# Patient Record
Sex: MALE | Race: BLACK OR AFRICAN AMERICAN | NOT HISPANIC OR LATINO | Employment: OTHER | URBAN - METROPOLITAN AREA
[De-identification: names, ages, dates, MRNs, and addresses within clinical notes are randomized per-mention and may not be internally consistent; named-entity substitution may affect disease eponyms.]

---

## 2022-02-14 ENCOUNTER — HOSPITAL ENCOUNTER (INPATIENT)
Facility: HOSPITAL | Age: 72
LOS: 7 days | Discharge: HOME HEALTH | DRG: 617 | End: 2022-02-21
Attending: EMERGENCY MEDICINE | Admitting: FAMILY MEDICINE
Payer: MEDICARE

## 2022-02-14 DIAGNOSIS — E11.621 DIABETIC ULCER OF TOE OF LEFT FOOT ASSOCIATED WITH TYPE 2 DIABETES MELLITUS, WITH FAT LAYER EXPOSED: Primary | ICD-10-CM

## 2022-02-14 DIAGNOSIS — L97.522 DIABETIC ULCER OF TOE OF LEFT FOOT ASSOCIATED WITH TYPE 2 DIABETES MELLITUS, WITH FAT LAYER EXPOSED: Primary | ICD-10-CM

## 2022-02-14 DIAGNOSIS — M86.9 OSTEOMYELITIS OF GREAT TOE OF LEFT FOOT: ICD-10-CM

## 2022-02-14 DIAGNOSIS — M86.10 ACUTE OSTEOMYELITIS: ICD-10-CM

## 2022-02-14 DIAGNOSIS — M79.676 TOE PAIN: ICD-10-CM

## 2022-02-14 PROBLEM — D50.9 MICROCYTIC ANEMIA: Status: ACTIVE | Noted: 2022-02-14

## 2022-02-14 PROBLEM — E66.811 OBESITY (BMI 30.0-34.9): Status: ACTIVE | Noted: 2022-02-14

## 2022-02-14 PROBLEM — L97.529 DIABETIC ULCER OF LEFT FOOT: Status: ACTIVE | Noted: 2022-02-14

## 2022-02-14 PROBLEM — N17.9 AKI (ACUTE KIDNEY INJURY): Status: ACTIVE | Noted: 2022-02-14

## 2022-02-14 PROBLEM — I10 HYPERTENSION: Status: ACTIVE | Noted: 2022-02-14

## 2022-02-14 PROBLEM — E66.9 OBESITY (BMI 30.0-34.9): Status: ACTIVE | Noted: 2022-02-14

## 2022-02-14 LAB
ALBUMIN SERPL BCP-MCNC: 3.8 G/DL (ref 3.5–5.2)
ALP SERPL-CCNC: 106 U/L (ref 55–135)
ALT SERPL W/O P-5'-P-CCNC: 13 U/L (ref 10–44)
ANION GAP SERPL CALC-SCNC: 12 MMOL/L (ref 8–16)
AST SERPL-CCNC: 15 U/L (ref 10–40)
BASOPHILS # BLD AUTO: 0.05 K/UL (ref 0–0.2)
BASOPHILS NFR BLD: 0.4 % (ref 0–1.9)
BILIRUB SERPL-MCNC: 0.4 MG/DL (ref 0.1–1)
BUN SERPL-MCNC: 34 MG/DL (ref 8–23)
CALCIUM SERPL-MCNC: 9.1 MG/DL (ref 8.7–10.5)
CHLORIDE SERPL-SCNC: 92 MMOL/L (ref 95–110)
CO2 SERPL-SCNC: 25 MMOL/L (ref 23–29)
CREAT SERPL-MCNC: 1.9 MG/DL (ref 0.5–1.4)
DIFFERENTIAL METHOD: ABNORMAL
EOSINOPHIL # BLD AUTO: 0.1 K/UL (ref 0–0.5)
EOSINOPHIL NFR BLD: 0.6 % (ref 0–8)
ERYTHROCYTE [DISTWIDTH] IN BLOOD BY AUTOMATED COUNT: 15.1 % (ref 11.5–14.5)
EST. GFR  (AFRICAN AMERICAN): 40.7 ML/MIN/1.73 M^2
EST. GFR  (NON AFRICAN AMERICAN): 35.2 ML/MIN/1.73 M^2
GLUCOSE SERPL-MCNC: 355 MG/DL (ref 70–110)
HCT VFR BLD AUTO: 36.4 % (ref 40–54)
HGB BLD-MCNC: 11 G/DL (ref 14–18)
IMM GRANULOCYTES # BLD AUTO: 0.03 K/UL (ref 0–0.04)
IMM GRANULOCYTES NFR BLD AUTO: 0.3 % (ref 0–0.5)
LACTATE SERPL-SCNC: 1.7 MMOL/L (ref 0.5–1.9)
LYMPHOCYTES # BLD AUTO: 1.7 K/UL (ref 1–4.8)
LYMPHOCYTES NFR BLD: 15.1 % (ref 18–48)
MCH RBC QN AUTO: 23.9 PG (ref 27–31)
MCHC RBC AUTO-ENTMCNC: 30.2 G/DL (ref 32–36)
MCV RBC AUTO: 79 FL (ref 82–98)
MONOCYTES # BLD AUTO: 0.7 K/UL (ref 0.3–1)
MONOCYTES NFR BLD: 5.9 % (ref 4–15)
NEUTROPHILS # BLD AUTO: 9 K/UL (ref 1.8–7.7)
NEUTROPHILS NFR BLD: 77.7 % (ref 38–73)
NRBC BLD-RTO: 0 /100 WBC
PLATELET # BLD AUTO: 544 K/UL (ref 150–450)
PMV BLD AUTO: 10.9 FL (ref 9.2–12.9)
POTASSIUM SERPL-SCNC: 4.8 MMOL/L (ref 3.5–5.1)
PROT SERPL-MCNC: 9 G/DL (ref 6–8.4)
RBC # BLD AUTO: 4.6 M/UL (ref 4.6–6.2)
SARS-COV-2 RDRP RESP QL NAA+PROBE: NEGATIVE
SODIUM SERPL-SCNC: 129 MMOL/L (ref 136–145)
WBC # BLD AUTO: 11.55 K/UL (ref 3.9–12.7)

## 2022-02-14 PROCEDURE — 83605 ASSAY OF LACTIC ACID: CPT | Performed by: EMERGENCY MEDICINE

## 2022-02-14 PROCEDURE — 86140 C-REACTIVE PROTEIN: CPT | Performed by: EMERGENCY MEDICINE

## 2022-02-14 PROCEDURE — U0002 COVID-19 LAB TEST NON-CDC: HCPCS | Performed by: NURSE PRACTITIONER

## 2022-02-14 PROCEDURE — 80053 COMPREHEN METABOLIC PANEL: CPT | Performed by: EMERGENCY MEDICINE

## 2022-02-14 PROCEDURE — 87070 CULTURE OTHR SPECIMN AEROBIC: CPT | Performed by: EMERGENCY MEDICINE

## 2022-02-14 PROCEDURE — 85025 COMPLETE CBC W/AUTO DIFF WBC: CPT | Performed by: EMERGENCY MEDICINE

## 2022-02-14 PROCEDURE — 87186 SC STD MICRODIL/AGAR DIL: CPT | Performed by: EMERGENCY MEDICINE

## 2022-02-14 PROCEDURE — 63600175 PHARM REV CODE 636 W HCPCS: Performed by: EMERGENCY MEDICINE

## 2022-02-14 PROCEDURE — 36415 COLL VENOUS BLD VENIPUNCTURE: CPT | Performed by: EMERGENCY MEDICINE

## 2022-02-14 PROCEDURE — 87040 BLOOD CULTURE FOR BACTERIA: CPT | Performed by: EMERGENCY MEDICINE

## 2022-02-14 PROCEDURE — 96365 THER/PROPH/DIAG IV INF INIT: CPT

## 2022-02-14 PROCEDURE — 12000002 HC ACUTE/MED SURGE SEMI-PRIVATE ROOM

## 2022-02-14 PROCEDURE — 87077 CULTURE AEROBIC IDENTIFY: CPT | Performed by: EMERGENCY MEDICINE

## 2022-02-14 PROCEDURE — 99285 EMERGENCY DEPT VISIT HI MDM: CPT | Mod: 25

## 2022-02-14 PROCEDURE — 96367 TX/PROPH/DG ADDL SEQ IV INF: CPT

## 2022-02-14 RX ORDER — MAGNESIUM SULFATE HEPTAHYDRATE 40 MG/ML
2 INJECTION, SOLUTION INTRAVENOUS
Status: DISCONTINUED | OUTPATIENT
Start: 2022-02-15 | End: 2022-02-21 | Stop reason: HOSPADM

## 2022-02-14 RX ORDER — VANCOMYCIN HCL IN 5 % DEXTROSE 1G/250ML
1000 PLASTIC BAG, INJECTION (ML) INTRAVENOUS ONCE
Status: COMPLETED | OUTPATIENT
Start: 2022-02-14 | End: 2022-02-14

## 2022-02-14 RX ORDER — POTASSIUM CHLORIDE 7.45 MG/ML
20 INJECTION INTRAVENOUS
Status: DISCONTINUED | OUTPATIENT
Start: 2022-02-15 | End: 2022-02-21 | Stop reason: HOSPADM

## 2022-02-14 RX ORDER — GLUCAGON 1 MG
1 KIT INJECTION
Status: DISCONTINUED | OUTPATIENT
Start: 2022-02-15 | End: 2022-02-21 | Stop reason: HOSPADM

## 2022-02-14 RX ORDER — POLYETHYLENE GLYCOL 3350 17 G/17G
17 POWDER, FOR SOLUTION ORAL 2 TIMES DAILY PRN
Status: DISCONTINUED | OUTPATIENT
Start: 2022-02-15 | End: 2022-02-21 | Stop reason: HOSPADM

## 2022-02-14 RX ORDER — POTASSIUM CHLORIDE 20 MEQ/1
40 TABLET, EXTENDED RELEASE ORAL
Status: DISCONTINUED | OUTPATIENT
Start: 2022-02-15 | End: 2022-02-21 | Stop reason: HOSPADM

## 2022-02-14 RX ORDER — MAGNESIUM SULFATE HEPTAHYDRATE 40 MG/ML
4 INJECTION, SOLUTION INTRAVENOUS
Status: DISCONTINUED | OUTPATIENT
Start: 2022-02-15 | End: 2022-02-21 | Stop reason: HOSPADM

## 2022-02-14 RX ORDER — PRAVASTATIN SODIUM 40 MG/1
40 TABLET ORAL DAILY
COMMUNITY

## 2022-02-14 RX ORDER — NAPROXEN SODIUM 220 MG/1
81 TABLET, FILM COATED ORAL DAILY
Status: DISCONTINUED | OUTPATIENT
Start: 2022-02-15 | End: 2022-02-21 | Stop reason: HOSPADM

## 2022-02-14 RX ORDER — MAGNESIUM SULFATE 1 G/100ML
1 INJECTION INTRAVENOUS
Status: DISCONTINUED | OUTPATIENT
Start: 2022-02-15 | End: 2022-02-21 | Stop reason: HOSPADM

## 2022-02-14 RX ORDER — POTASSIUM CHLORIDE 20 MEQ/1
20 TABLET, EXTENDED RELEASE ORAL
Status: DISCONTINUED | OUTPATIENT
Start: 2022-02-15 | End: 2022-02-21 | Stop reason: HOSPADM

## 2022-02-14 RX ORDER — TALC
6 POWDER (GRAM) TOPICAL NIGHTLY PRN
Status: DISCONTINUED | OUTPATIENT
Start: 2022-02-15 | End: 2022-02-18

## 2022-02-14 RX ORDER — SODIUM CHLORIDE 0.9 % (FLUSH) 0.9 %
10 SYRINGE (ML) INJECTION EVERY 12 HOURS PRN
Status: DISCONTINUED | OUTPATIENT
Start: 2022-02-15 | End: 2022-02-21 | Stop reason: HOSPADM

## 2022-02-14 RX ORDER — ENOXAPARIN SODIUM 100 MG/ML
40 INJECTION SUBCUTANEOUS EVERY 24 HOURS
Status: DISCONTINUED | OUTPATIENT
Start: 2022-02-15 | End: 2022-02-21 | Stop reason: HOSPADM

## 2022-02-14 RX ORDER — LOSARTAN POTASSIUM 100 MG/1
100 TABLET ORAL DAILY
COMMUNITY

## 2022-02-14 RX ORDER — IBUPROFEN 200 MG
24 TABLET ORAL
Status: DISCONTINUED | OUTPATIENT
Start: 2022-02-15 | End: 2022-02-21 | Stop reason: HOSPADM

## 2022-02-14 RX ORDER — AMOXICILLIN 250 MG
1 CAPSULE ORAL 2 TIMES DAILY
Status: DISCONTINUED | OUTPATIENT
Start: 2022-02-15 | End: 2022-02-21 | Stop reason: HOSPADM

## 2022-02-14 RX ORDER — NALOXONE HCL 0.4 MG/ML
0.02 VIAL (ML) INJECTION
Status: DISCONTINUED | OUTPATIENT
Start: 2022-02-15 | End: 2022-02-21 | Stop reason: HOSPADM

## 2022-02-14 RX ORDER — INSULIN ASPART 100 [IU]/ML
0-5 INJECTION, SOLUTION INTRAVENOUS; SUBCUTANEOUS
Status: DISCONTINUED | OUTPATIENT
Start: 2022-02-15 | End: 2022-02-15

## 2022-02-14 RX ORDER — LANOLIN ALCOHOL/MO/W.PET/CERES
800 CREAM (GRAM) TOPICAL
Status: DISCONTINUED | OUTPATIENT
Start: 2022-02-15 | End: 2022-02-21 | Stop reason: HOSPADM

## 2022-02-14 RX ORDER — CEFEPIME HYDROCHLORIDE 1 G/50ML
1 INJECTION, SOLUTION INTRAVENOUS
Status: DISCONTINUED | OUTPATIENT
Start: 2022-02-15 | End: 2022-02-18

## 2022-02-14 RX ORDER — IBUPROFEN 200 MG
16 TABLET ORAL
Status: DISCONTINUED | OUTPATIENT
Start: 2022-02-15 | End: 2022-02-21 | Stop reason: HOSPADM

## 2022-02-14 RX ORDER — MORPHINE SULFATE 4 MG/ML
4 INJECTION, SOLUTION INTRAMUSCULAR; INTRAVENOUS EVERY 4 HOURS PRN
Status: DISCONTINUED | OUTPATIENT
Start: 2022-02-15 | End: 2022-02-18

## 2022-02-14 RX ORDER — POTASSIUM CHLORIDE 7.45 MG/ML
40 INJECTION INTRAVENOUS
Status: DISCONTINUED | OUTPATIENT
Start: 2022-02-15 | End: 2022-02-21 | Stop reason: HOSPADM

## 2022-02-14 RX ORDER — SPIRONOLACTONE 50 MG/1
50 TABLET, FILM COATED ORAL DAILY
COMMUNITY
Start: 2022-04-19 | End: 2022-04-19 | Stop reason: ALTCHOICE

## 2022-02-14 RX ORDER — ONDANSETRON 2 MG/ML
4 INJECTION INTRAMUSCULAR; INTRAVENOUS EVERY 8 HOURS PRN
Status: DISCONTINUED | OUTPATIENT
Start: 2022-02-15 | End: 2022-02-21 | Stop reason: HOSPADM

## 2022-02-14 RX ORDER — ACETAMINOPHEN 325 MG/1
650 TABLET ORAL EVERY 4 HOURS PRN
Status: DISCONTINUED | OUTPATIENT
Start: 2022-02-15 | End: 2022-02-21 | Stop reason: HOSPADM

## 2022-02-14 RX ORDER — HYDROCODONE BITARTRATE AND ACETAMINOPHEN 5; 325 MG/1; MG/1
1 TABLET ORAL EVERY 6 HOURS PRN
Status: DISCONTINUED | OUTPATIENT
Start: 2022-02-15 | End: 2022-02-17

## 2022-02-14 RX ADMIN — PIPERACILLIN SODIUM AND TAZOBACTAM SODIUM 3.38 G: 3; .375 INJECTION, POWDER, LYOPHILIZED, FOR SOLUTION INTRAVENOUS at 09:02

## 2022-02-14 RX ADMIN — VANCOMYCIN HYDROCHLORIDE 1000 MG: 1 INJECTION, POWDER, LYOPHILIZED, FOR SOLUTION INTRAVENOUS at 09:02

## 2022-02-15 LAB
ALBUMIN SERPL BCP-MCNC: 3.4 G/DL (ref 3.5–5.2)
ALP SERPL-CCNC: 95 U/L (ref 55–135)
ALT SERPL W/O P-5'-P-CCNC: 14 U/L (ref 10–44)
ANION GAP SERPL CALC-SCNC: 11 MMOL/L (ref 8–16)
AST SERPL-CCNC: 12 U/L (ref 10–40)
BACTERIA #/AREA URNS HPF: NEGATIVE /HPF
BASOPHILS # BLD AUTO: 0.08 K/UL (ref 0–0.2)
BASOPHILS NFR BLD: 0.8 % (ref 0–1.9)
BILIRUB SERPL-MCNC: 0.7 MG/DL (ref 0.1–1)
BILIRUB UR QL STRIP: NEGATIVE
BUN SERPL-MCNC: 30 MG/DL (ref 8–23)
CALCIUM SERPL-MCNC: 8.8 MG/DL (ref 8.7–10.5)
CHLORIDE SERPL-SCNC: 92 MMOL/L (ref 95–110)
CLARITY UR: CLEAR
CO2 SERPL-SCNC: 25 MMOL/L (ref 23–29)
COLOR UR: YELLOW
CREAT SERPL-MCNC: 1.6 MG/DL (ref 0.5–1.4)
CRP SERPL-MCNC: 3.97 MG/DL
DIFFERENTIAL METHOD: ABNORMAL
EOSINOPHIL # BLD AUTO: 0.1 K/UL (ref 0–0.5)
EOSINOPHIL NFR BLD: 0.6 % (ref 0–8)
ERYTHROCYTE [DISTWIDTH] IN BLOOD BY AUTOMATED COUNT: 15.3 % (ref 11.5–14.5)
EST. GFR  (AFRICAN AMERICAN): 50.1 ML/MIN/1.73 M^2
EST. GFR  (NON AFRICAN AMERICAN): 43.3 ML/MIN/1.73 M^2
ESTIMATED AVG GLUCOSE: 424 MG/DL (ref 68–131)
GLUCOSE SERPL-MCNC: 238 MG/DL (ref 70–110)
GLUCOSE SERPL-MCNC: 249 MG/DL (ref 70–110)
GLUCOSE SERPL-MCNC: 365 MG/DL (ref 70–110)
GLUCOSE SERPL-MCNC: 380 MG/DL (ref 70–110)
GLUCOSE SERPL-MCNC: 382 MG/DL (ref 70–110)
GLUCOSE SERPL-MCNC: 416 MG/DL (ref 70–110)
GLUCOSE SERPL-MCNC: 475 MG/DL (ref 70–110)
GLUCOSE UR QL STRIP: ABNORMAL
HBA1C MFR BLD: 16.4 % (ref 4.5–6.2)
HCT VFR BLD AUTO: 34 % (ref 40–54)
HGB BLD-MCNC: 10.4 G/DL (ref 14–18)
HGB UR QL STRIP: NEGATIVE
HYALINE CASTS #/AREA URNS LPF: 8 /LPF
IMM GRANULOCYTES # BLD AUTO: 0.04 K/UL (ref 0–0.04)
IMM GRANULOCYTES NFR BLD AUTO: 0.4 % (ref 0–0.5)
KETONES UR QL STRIP: ABNORMAL
LEUKOCYTE ESTERASE UR QL STRIP: ABNORMAL
LYMPHOCYTES # BLD AUTO: 2 K/UL (ref 1–4.8)
LYMPHOCYTES NFR BLD: 20.7 % (ref 18–48)
MAGNESIUM SERPL-MCNC: 2.2 MG/DL (ref 1.6–2.6)
MCH RBC QN AUTO: 24.4 PG (ref 27–31)
MCHC RBC AUTO-ENTMCNC: 30.6 G/DL (ref 32–36)
MCV RBC AUTO: 80 FL (ref 82–98)
MICROSCOPIC COMMENT: ABNORMAL
MONOCYTES # BLD AUTO: 0.8 K/UL (ref 0.3–1)
MONOCYTES NFR BLD: 7.9 % (ref 4–15)
NEUTROPHILS # BLD AUTO: 6.7 K/UL (ref 1.8–7.7)
NEUTROPHILS NFR BLD: 69.6 % (ref 38–73)
NITRITE UR QL STRIP: NEGATIVE
NRBC BLD-RTO: 0 /100 WBC
PH UR STRIP: 5 [PH] (ref 5–8)
PLATELET # BLD AUTO: 455 K/UL (ref 150–450)
PMV BLD AUTO: 10.9 FL (ref 9.2–12.9)
POTASSIUM SERPL-SCNC: 4.7 MMOL/L (ref 3.5–5.1)
PROCALCITONIN SERPL IA-MCNC: <0.05 NG/ML (ref 0–0.5)
PROT SERPL-MCNC: 7.9 G/DL (ref 6–8.4)
PROT UR QL STRIP: ABNORMAL
RBC # BLD AUTO: 4.27 M/UL (ref 4.6–6.2)
RBC #/AREA URNS HPF: 3 /HPF (ref 0–4)
SODIUM SERPL-SCNC: 128 MMOL/L (ref 136–145)
SP GR UR STRIP: 1.03 (ref 1–1.03)
SQUAMOUS #/AREA URNS HPF: 3 /HPF
URN SPEC COLLECT METH UR: ABNORMAL
UROBILINOGEN UR STRIP-ACNC: NEGATIVE EU/DL
WBC # BLD AUTO: 9.61 K/UL (ref 3.9–12.7)
WBC #/AREA URNS HPF: 6 /HPF (ref 0–5)
YEAST URNS QL MICRO: ABNORMAL

## 2022-02-15 PROCEDURE — 36415 COLL VENOUS BLD VENIPUNCTURE: CPT | Performed by: NURSE PRACTITIONER

## 2022-02-15 PROCEDURE — 82947 ASSAY GLUCOSE BLOOD QUANT: CPT | Performed by: FAMILY MEDICINE

## 2022-02-15 PROCEDURE — 84145 PROCALCITONIN (PCT): CPT | Performed by: EMERGENCY MEDICINE

## 2022-02-15 PROCEDURE — 85025 COMPLETE CBC W/AUTO DIFF WBC: CPT | Performed by: NURSE PRACTITIONER

## 2022-02-15 PROCEDURE — 25000003 PHARM REV CODE 250: Performed by: FAMILY MEDICINE

## 2022-02-15 PROCEDURE — 81001 URINALYSIS AUTO W/SCOPE: CPT | Performed by: EMERGENCY MEDICINE

## 2022-02-15 PROCEDURE — 25000003 PHARM REV CODE 250: Performed by: NURSE PRACTITIONER

## 2022-02-15 PROCEDURE — 63600175 PHARM REV CODE 636 W HCPCS: Performed by: FAMILY MEDICINE

## 2022-02-15 PROCEDURE — 12000002 HC ACUTE/MED SURGE SEMI-PRIVATE ROOM

## 2022-02-15 PROCEDURE — 99222 1ST HOSP IP/OBS MODERATE 55: CPT | Mod: ,,, | Performed by: PODIATRIST

## 2022-02-15 PROCEDURE — C9399 UNCLASSIFIED DRUGS OR BIOLOG: HCPCS | Performed by: FAMILY MEDICINE

## 2022-02-15 PROCEDURE — 99222 PR INITIAL HOSPITAL CARE,LEVL II: ICD-10-PCS | Mod: ,,, | Performed by: PODIATRIST

## 2022-02-15 PROCEDURE — 83036 HEMOGLOBIN GLYCOSYLATED A1C: CPT | Performed by: NURSE PRACTITIONER

## 2022-02-15 PROCEDURE — 83735 ASSAY OF MAGNESIUM: CPT | Performed by: NURSE PRACTITIONER

## 2022-02-15 PROCEDURE — 36415 COLL VENOUS BLD VENIPUNCTURE: CPT | Performed by: FAMILY MEDICINE

## 2022-02-15 PROCEDURE — 63600175 PHARM REV CODE 636 W HCPCS: Performed by: NURSE PRACTITIONER

## 2022-02-15 PROCEDURE — 80053 COMPREHEN METABOLIC PANEL: CPT | Performed by: NURSE PRACTITIONER

## 2022-02-15 RX ORDER — SODIUM CHLORIDE, SODIUM LACTATE, POTASSIUM CHLORIDE, CALCIUM CHLORIDE 600; 310; 30; 20 MG/100ML; MG/100ML; MG/100ML; MG/100ML
INJECTION, SOLUTION INTRAVENOUS CONTINUOUS
Status: ACTIVE | OUTPATIENT
Start: 2022-02-15 | End: 2022-02-16

## 2022-02-15 RX ORDER — HYDRALAZINE HYDROCHLORIDE 25 MG/1
25 TABLET, FILM COATED ORAL EVERY 8 HOURS
Status: DISCONTINUED | OUTPATIENT
Start: 2022-02-15 | End: 2022-02-21 | Stop reason: HOSPADM

## 2022-02-15 RX ORDER — PRAVASTATIN SODIUM 40 MG/1
40 TABLET ORAL NIGHTLY
Status: DISCONTINUED | OUTPATIENT
Start: 2022-02-15 | End: 2022-02-21 | Stop reason: HOSPADM

## 2022-02-15 RX ORDER — CLONIDINE HYDROCHLORIDE 0.1 MG/1
0.1 TABLET ORAL EVERY 8 HOURS PRN
Status: DISCONTINUED | OUTPATIENT
Start: 2022-02-15 | End: 2022-02-21 | Stop reason: HOSPADM

## 2022-02-15 RX ADMIN — CEFEPIME HYDROCHLORIDE 1 G: 1 INJECTION, SOLUTION INTRAVENOUS at 09:02

## 2022-02-15 RX ADMIN — HUMAN INSULIN 6 UNITS: 100 INJECTION, SOLUTION SUBCUTANEOUS at 05:02

## 2022-02-15 RX ADMIN — HYDROCODONE BITARTRATE AND ACETAMINOPHEN 1 TABLET: 5; 325 TABLET ORAL at 09:02

## 2022-02-15 RX ADMIN — HYDRALAZINE HYDROCHLORIDE 25 MG: 25 TABLET ORAL at 01:02

## 2022-02-15 RX ADMIN — VANCOMYCIN HYDROCHLORIDE 1750 MG: 500 INJECTION, POWDER, LYOPHILIZED, FOR SOLUTION INTRAVENOUS at 08:02

## 2022-02-15 RX ADMIN — MELATONIN 6 MG: at 08:02

## 2022-02-15 RX ADMIN — ENOXAPARIN SODIUM 40 MG: 40 INJECTION SUBCUTANEOUS at 04:02

## 2022-02-15 RX ADMIN — PRAVASTATIN SODIUM 40 MG: 40 TABLET ORAL at 08:02

## 2022-02-15 RX ADMIN — SENNOSIDES AND DOCUSATE SODIUM 1 TABLET: 50; 8.6 TABLET ORAL at 08:02

## 2022-02-15 RX ADMIN — CEFEPIME HYDROCHLORIDE 1 G: 1 INJECTION, SOLUTION INTRAVENOUS at 04:02

## 2022-02-15 RX ADMIN — HUMAN INSULIN 20 UNITS: 100 INJECTION, SOLUTION SUBCUTANEOUS at 01:02

## 2022-02-15 RX ADMIN — INSULIN DETEMIR 30 UNITS: 100 INJECTION, SOLUTION SUBCUTANEOUS at 08:02

## 2022-02-15 RX ADMIN — HYDROCODONE BITARTRATE AND ACETAMINOPHEN 1 TABLET: 5; 325 TABLET ORAL at 01:02

## 2022-02-15 RX ADMIN — HUMAN INSULIN 6 UNITS: 100 INJECTION, SOLUTION SUBCUTANEOUS at 09:02

## 2022-02-15 RX ADMIN — SODIUM CHLORIDE, SODIUM LACTATE, POTASSIUM CHLORIDE, AND CALCIUM CHLORIDE: .6; .31; .03; .02 INJECTION, SOLUTION INTRAVENOUS at 09:02

## 2022-02-15 RX ADMIN — HUMAN INSULIN 18 UNITS: 100 INJECTION, SOLUTION SUBCUTANEOUS at 11:02

## 2022-02-15 RX ADMIN — HYDROCODONE BITARTRATE AND ACETAMINOPHEN 1 TABLET: 5; 325 TABLET ORAL at 08:02

## 2022-02-15 RX ADMIN — HYDRALAZINE HYDROCHLORIDE 25 MG: 25 TABLET ORAL at 10:02

## 2022-02-15 RX ADMIN — ASPIRIN 81 MG 81 MG: 81 TABLET ORAL at 09:02

## 2022-02-15 RX ADMIN — HUMAN INSULIN 15 UNITS: 100 INJECTION, SOLUTION SUBCUTANEOUS at 08:02

## 2022-02-15 RX ADMIN — CEFEPIME HYDROCHLORIDE 1 G: 1 INJECTION, SOLUTION INTRAVENOUS at 01:02

## 2022-02-15 RX ADMIN — SENNOSIDES AND DOCUSATE SODIUM 1 TABLET: 50; 8.6 TABLET ORAL at 09:02

## 2022-02-15 NOTE — H&P
Critical access hospital Medicine  History & Physical    DOS: 02/14/2022  11:36 PM      Patient Name: Bandar Tomas  MRN: [id number]  Patient Class: IP- Inpatient  Admission Date: 2/14/2022  Attending Physician: Dr. Eduardo  Primary Care Provider: Veena Stroud MD         Patient information was obtained from patient and ER records.     Subjective:     Principal Problem:Osteomyelitis of great toe of left foot    Chief Complaint:   Chief Complaint   Patient presents with    Toe Pain     States has an infected L great toe for 2 weeks and is a diabetic        HPI: Mr. Reyes is a 71-year-old male with a past medical history of hypertension, hyperlipidemia, insulin-dependent diabetes mellitus type 2, and obesity BMI 32 who presents today with complaints of a left toe infection.  It is severe.  It is associated with purulent drainage, malodor, and pain.  He denies fever, chills, nausea, vomiting, diarrhea, dizziness, chest pain, shortness of breath, loss of consciousness.  He has known injury to the toe.  He states he took off his today issues about 2 weeks ago and noticeable ulcerations status toe.  He went to the pharmacy and ask pharmacist for recommendations and was recommended triple antibiotic ointment and soaks for the toe which he did with no improvement.  He does not have a podiatrist.  He states his glucoses have been out of control over the last 2 weeks foot due to this infection.  He does not have an endocrinologist.  X-ray of foot in the ED revealed: Osteomyelitis of great toe proximal and distal phalanges. Pathologic fracture of great toe distal phalanx      Past Medical History:   Diagnosis Date    Diabetes     Former smoker     HTN (hypertension)        Past Surgical History:   Procedure Laterality Date    COLONOSCOPY  prior to 2007    COLONOSCOPY N/A 3/31/2016    Procedure: COLONOSCOPY;  Surgeon: Perry Rodriguez MD;  Location: [facility];  Service: Endoscopy;  Laterality: N/A;     SHOULDER SURGERY Left     UPPER GASTROINTESTINAL ENDOSCOPY         Review of patient's allergies indicates:  No Known Allergies    No current facility-administered medications on file prior to encounter.     Current Outpatient Medications on File Prior to Encounter   Medication Sig    aspirin 81 MG Chew Take 81 mg by mouth once daily.    insulin  unit/mL injection Inject 25 Units into the skin 2 (two) times daily before meals.    insulin regular 100 unit/mL Inj injection Inject 25 Units into the skin 2 (two) times daily before meals.    losartan (COZAAR) 100 MG tablet Take 100 mg by mouth once daily.    pravastatin (PRAVACHOL) 40 MG tablet Take 40 mg by mouth once daily.    spironolactone (ALDACTONE) 50 MG tablet Take 50 mg by mouth once daily.    [DISCONTINUED] insulin NPH (NOVOLIN N) 100 unit/mL injection Inject 30 Units into the skin 2 (two) times daily before meals.    [DISCONTINUED] insulin NPH-insulin regular, 70/30, (NOVOLIN 70/30) 100 unit/mL (70-30) injection Inject 30 Units into the skin 2 (two) times daily.    [DISCONTINUED] insulin regular 100 unit/mL Inj injection Inject 10 Units into the skin 2 (two) times daily before meals.     Family History     Problem Relation (Age of Onset)    Brain cancer Brother (54)    Esophageal cancer Paternal Grandfather (87)        Tobacco Use    Smoking status: Former Smoker    Smokeless tobacco: Not on file   Substance and Sexual Activity    Alcohol use: Yes     Alcohol/week: 0.0 standard drinks    Drug use: No    Sexual activity: Not on file     Review of Systems   Constitutional: Negative for chills, diaphoresis, fatigue and fever.   HENT: Negative for congestion, ear pain, sore throat and trouble swallowing.    Eyes: Negative for pain, discharge and visual disturbance.   Respiratory: Negative for cough, chest tightness, shortness of breath and wheezing.    Cardiovascular: Negative for chest pain, palpitations and leg swelling.   Gastrointestinal:  Negative for abdominal distention, abdominal pain, blood in stool, constipation, diarrhea, nausea and vomiting.   Endocrine: Negative for polydipsia, polyphagia and polyuria.   Genitourinary: Negative for dysuria, flank pain, frequency and urgency.   Musculoskeletal: Positive for arthralgias. Negative for back pain, joint swelling, neck pain and neck stiffness.   Skin: Positive for wound. Negative for rash.   Allergic/Immunologic: Negative for immunocompromised state.   Neurological: Negative for dizziness, syncope, speech difficulty, weakness, light-headedness, numbness and headaches.   Hematological: Negative for adenopathy.   Psychiatric/Behavioral: Negative for confusion and suicidal ideas. The patient is not nervous/anxious.    All other systems reviewed and are negative.    Objective:     Vital Signs (Most Recent):  Temp: 98.2 °F (36.8 °C) (02/14/22 1740)  Pulse: 91 (02/14/22 2200)  Resp: 20 (02/14/22 1740)  BP: 134/73 (02/14/22 2200)  SpO2: 99 % (02/14/22 2200) Vital Signs (24h Range):  Temp:  [98.2 °F (36.8 °C)] 98.2 °F (36.8 °C)  Pulse:  [91-99] 91  Resp:  [20] 20  SpO2:  [97 %-99 %] 99 %  BP: (128-134)/(73-74) 134/73     Weight: 113.4 kg (250 lb)  Body mass index is 32.98 kg/m².    Physical Exam  Vitals and nursing note reviewed.   Constitutional:       Appearance: He is well-developed and well-nourished. He is obese.   HENT:      Head: Normocephalic and atraumatic.   Eyes:      Extraocular Movements: EOM normal.      Conjunctiva/sclera: Conjunctivae normal.      Pupils: Pupils are equal, round, and reactive to light.   Cardiovascular:      Rate and Rhythm: Normal rate and regular rhythm.      Pulses: Intact distal pulses.      Heart sounds: Murmur heard.        Comments: bilateral pedal pulses 1+  Pulmonary:      Effort: Pulmonary effort is normal.      Breath sounds: Normal breath sounds.   Abdominal:      General: Bowel sounds are normal.      Palpations: Abdomen is soft.   Musculoskeletal:          General: Normal range of motion.      Cervical back: Normal range of motion and neck supple.   Skin:     General: Skin is warm and dry.      Capillary Refill: Capillary refill takes less than 2 seconds.      Findings: Lesion present.      Comments: Left great toe is pale ulceration the inner lateral side draining purulence drainage ulcerations on the plantar surfaces well please see picture for further details   Neurological:      Mental Status: He is alert and oriented to person, place, and time.   Psychiatric:         Mood and Affect: Mood and affect normal.         Behavior: Behavior normal.         Thought Content: Thought content normal.         Judgment: Judgment normal.           CRANIAL NERVES     CN III, IV, VI   Pupils are equal, round, and reactive to light.  Extraocular motions are normal.        Significant Labs:   All pertinent labs within the past 24 hours have been reviewed.  CBC:   Recent Labs   Lab 02/14/22 2123   WBC 11.55   HGB 11.0*   HCT 36.4*   *     CMP:   Recent Labs   Lab 02/14/22 2123   *   K 4.8   CL 92*   CO2 25   *   BUN 34*   CREATININE 1.9*   CALCIUM 9.1   PROT 9.0*   ALBUMIN 3.8   BILITOT 0.4   ALKPHOS 106   AST 15   ALT 13   ANIONGAP 12   EGFRNONAA 35.2*     Lactic Acid:   Recent Labs   Lab 02/14/22 2123   LACTATE 1.7       Significant Imaging: I have reviewed all pertinent imaging results/findings within the past 24 hours.  I have reviewed and interpreted all pertinent imaging results/findings within the past 24 hours.     X-Ray Foot Complete Left    Result Date: 2/14/2022  XR FOOT 3 OR MORE VIEWS CLINICAL HISTORY: 71 years Male Left great toe pain from diabetic foot infection COMPARISON: None FINDINGS: Soft tissue swelling of the great toe, with soft tissue gas along the plantar aspect of the great toe. Osteolysis of great toe distal phalanx consistent with osteomyelitis, with associated pathologic fracture. There is also osteolysis along the lateral aspect  of the great toe proximal phalangeal head, consistent with osteomyelitis. Mild great toe MTP osteoarthrosis. Second through fifth phalanges appear intact. IMPRESSION: Osteomyelitis of great toe proximal and distal phalanges. Pathologic fracture of great toe distal phalanx. Electronically signed by:  Efe Stroud MD  2/14/2022 7:16 PM CST Workstation: [id number]     left foot    left foot      Assessment/Plan:     Active Hospital Problems    Diagnosis    *Osteomyelitis of great toe of left foot    Diabetic ulcer of left foot    Uncontrolled type 2 diabetes mellitus    Hypertension    NATHALIE (acute kidney injury)    Microcytic anemia    Obesity (BMI 30.0-34.9)     PLAN:  Admit to med tele  Consult Podiatry  Consult wound care   Cefepime/vanc  Hold losartan and spironolactone, unclear if this is NATHALIE or CKD, patient does not know of any history of CKD  Start hydralazine  P.r.n. clonidine  Check CRP and procalcitonin  Discussed with patient high risk of amputation of the left great toe  Educated him that if he ever has another ulceration on his foot he needs to seek medical attention right away either with a podiatrist or PCP  VTE Risk Mitigation (From admission, onward)         Ordered     enoxaparin injection 40 mg  Daily         02/14/22 2313     IP VTE HIGH RISK PATIENT  Once         02/14/22 2313     Place sequential compression device  Until discontinued         02/14/22 2313                   Shanthi Gibbons NP  Department of Hospital Medicine   [facility]

## 2022-02-15 NOTE — PROGRESS NOTES
[facility] Medicine  Progress Note    Patient name: Bandar Tomas  MRN: [id number]  Admit Date: 2/14/2022   LOS: 1 day     SUBJECTIVE:     Principal problem: Osteomyelitis of great toe of left foot    Interval History:  Afebrile the past 24 hours on IV antibiotics.  No leukocytosis.  NATHALIE improved on IV fluids.  US arterial left lower extremity ordered.  Feels okay.  Wound care bedside.  Podiatry to see patient.    Scheduled Meds:   aspirin  81 mg Oral Daily    ceFEPime (MAXIPIME) IVPB  1 g Intravenous Q8H    enoxaparin  40 mg Subcutaneous Daily    hydrALAZINE  25 mg Oral Q8H    insulin detemir U-100  30 Units Subcutaneous Daily    pravastatin  40 mg Oral QHS    senna-docusate 8.6-50 mg  1 tablet Oral BID    vancomycin (VANCOCIN) IVPB  1,750 mg Intravenous Q24H     Continuous Infusions:   lactated ringers       PRN Meds:acetaminophen, calcium chloride IVPB, calcium chloride IVPB, calcium chloride IVPB, cloNIDine, dextrose 50%, dextrose 50%, glucagon (human recombinant), glucose, glucose, HYDROcodone-acetaminophen, insulin regular, magnesium oxide, magnesium sulfate IVPB, magnesium sulfate IVPB, magnesium sulfate IVPB, magnesium sulfate IVPB, melatonin, morphine, naloxone, ondansetron, polyethylene glycol, potassium chloride in water, potassium chloride in water, potassium chloride in water, potassium chloride in water, potassium chloride, potassium chloride, potassium chloride, potassium chloride, sodium chloride 0.9%, Pharmacy to dose Vancomycin consult **AND** vancomycin - pharmacy to dose    Review of patient's allergies indicates:  No Known Allergies    Review of Systems  As per subjective    OBJECTIVE:     Vital Signs (Most Recent)  Temp: 98 °F (36.7 °C) (02/15/22 0400)  Pulse: 80 (02/15/22 0400)  Resp: 18 (02/15/22 0400)  BP: 117/73 (02/15/22 0400)  SpO2: 98 % (02/15/22 0400)    Vital Signs Range (Last 24H):  Temp:  [98 °F (36.7 °C)-98.4 °F (36.9 °C)]   Pulse:  [80-99]   Resp:   [18-20]   BP: (117-134)/(64-74)   SpO2:  [97 %-99 %]     I & O (Last 24H):    Intake/Output Summary (Last 24 hours) at 2/15/2022 0753  Last data filed at 2/14/2022 2150  Gross per 24 hour   Intake 100 ml   Output --   Net 100 ml       Physical Exam:  General: Patient resting comfortably in no acute distress. Appears as stated age. Calm. Obese  Eyes: EOM intact. No conjunctivae injection. No scleral icterus.  ENT: Hearing grossly intact. No discharge from ears. No nasal discharge.   CVS: RRR. No LE edema BL.  Lungs: CTA BL, no wheezing or crackles. Good breath sounds. No accessory muscle use. No acute respiratory distress  Neuro: Alert. Cranial nerves grossly intact. Moves all extremities equally. Follows commands. Responds appropriately   Psych: Mood, behavior, thought content and judgement normal  Skin: Foul smelling left great toe, dry diabetic ulcer on bottom right foot            Laboratory:  All pertinent labs within the past 24 hours have been reviewed.  CBC:   Recent Labs   Lab 02/14/22  2123 02/15/22  0649   WBC 11.55 9.61   HGB 11.0* 10.4*   HCT 36.4* 34.0*   * 455*     CMP:   Recent Labs   Lab 02/14/22  2123 02/15/22  0649 02/15/22  1149   * 128*  --    K 4.8 4.7  --    CL 92* 92*  --    CO2 25 25  --    * 382* 416*   BUN 34* 30*  --    CREATININE 1.9* 1.6*  --    CALCIUM 9.1 8.8  --    PROT 9.0* 7.9  --    ALBUMIN 3.8 3.4*  --    BILITOT 0.4 0.7  --    ALKPHOS 106 95  --    AST 15 12  --    ALT 13 14  --    ANIONGAP 12 11  --    EGFRNONAA 35.2* 43.3*  --        Microbiology Results (last 7 days)     Procedure Component Value Units Date/Time    Blood culture #1 **CANNOT BE ORDERED STAT** [[id number]] Collected: 02/14/22 2124    Order Status: Completed Specimen: Blood from Peripheral, Upper Arm, Right Updated: 02/15/22 0717     Blood Culture, Routine No Growth to date    Blood culture #2 **CANNOT BE ORDERED STAT** [[id number]] Collected: 02/14/22 2139    Order Status: Completed Specimen:  Blood from Peripheral, Upper Arm, Right Updated: 02/15/22 0717     Blood Culture, Routine No Growth to date    Aerobic culture [[id number]] Collected: 02/14/22 2042    Order Status: Sent Specimen: Wound from Toe, Left Foot Updated: 02/14/22 2048           Diagnostic Results:      ASSESSMENT/PLAN:     Active Hospital Problems    Diagnosis  POA    *Osteomyelitis of great toe of left foot [M86.9]  Yes    Diabetic ulcer of left foot [E11.621, L97.529]  Yes    Uncontrolled type 2 diabetes mellitus [E11.65]  Yes    Hypertension [I10]  Yes    NATHALIE (acute kidney injury) [N17.9]  Yes    Microcytic anemia [D50.9]  Yes    Obesity (BMI 30.0-34.9) [E66.9]  Yes      Resolved Hospital Problems   No resolved problems to display.           Plan:   Empiric vancomycin, cefepime IV  BCx NGTD  Wound culture G- rodrigue  Adjusted insulin regimen  Trial IVF  HbA1C 16.4  US left lower extremity arterial pending  Wound care  Continue home medications. Holding nephrotoxic medications    Podiatry consult      VTE Risk Mitigation (From admission, onward)         Ordered     enoxaparin injection 40 mg  Daily         02/14/22 2313     IP VTE HIGH RISK PATIENT  Once         02/14/22 2313     Place sequential compression device  Until discontinued         02/14/22 2313                        Patient care time was spent personally by me on the following activities: > 35 min  · Obtaining a history.  · Examination of patient.  · Providing medical care at the patients bedside.  · Developing a treatment plan with patient or surrogate and bedside caregivers.  · Ordering and reviewing laboratory studies, radiographic studies, pulse oximetry.  · Ordering and performing treatments and interventions.  · Evaluation of patient's response to treatment.  · Discussions with consultants while on the unit and immediately available to the patient.  · Re-evaluation of the patient's condition.  · Documentation in the medical record.       [facility]  [facility]  Perry Valadez MD    Please note: This note was transcribed using voice recognition software. Because of this technology, there are often uinintended grammatical, spelling, and other transcription errors. Please disregard these errors.

## 2022-02-15 NOTE — HPI
Mr. Reyes is a 71-year-old male with a past medical history of hypertension, hyperlipidemia, insulin-dependent diabetes mellitus type 2, and obesity BMI 32 who presents today with complaints of a left toe infection.  It is severe.  It is associated with purulent drainage, malodor, and pain.  He denies fever, chills, nausea, vomiting, diarrhea, dizziness, chest pain, shortness of breath, loss of consciousness.  He has known injury to the toe.  He states he took off his today issues about 2 weeks ago and noticeable ulcerations status toe.  He went to the pharmacy and ask pharmacist for recommendations and was recommended triple antibiotic ointment and soaks for the toe which he did with no improvement.  He does not have a podiatrist.  He states his glucoses have been out of control over the last 2 weeks foot due to this infection.  He does not have an endocrinologist.  X-ray of foot in the ED revealed: Osteomyelitis of great toe proximal and distal phalanges. Pathologic fracture of great toe distal phalanx

## 2022-02-15 NOTE — SUBJECTIVE & OBJECTIVE
Past Medical History:   Diagnosis Date    Diabetes     Former smoker     HTN (hypertension)        Past Surgical History:   Procedure Laterality Date    COLONOSCOPY  prior to 2007    COLONOSCOPY N/A 3/31/2016    Procedure: COLONOSCOPY;  Surgeon: Prery Rodriguez MD;  Location: [facility];  Service: Endoscopy;  Laterality: N/A;    SHOULDER SURGERY Left     UPPER GASTROINTESTINAL ENDOSCOPY         Review of patient's allergies indicates:  No Known Allergies    No current facility-administered medications on file prior to encounter.     Current Outpatient Medications on File Prior to Encounter   Medication Sig    aspirin 81 MG Chew Take 81 mg by mouth once daily.    insulin  unit/mL injection Inject 25 Units into the skin 2 (two) times daily before meals.    insulin regular 100 unit/mL Inj injection Inject 25 Units into the skin 2 (two) times daily before meals.    losartan (COZAAR) 100 MG tablet Take 100 mg by mouth once daily.    pravastatin (PRAVACHOL) 40 MG tablet Take 40 mg by mouth once daily.    spironolactone (ALDACTONE) 50 MG tablet Take 50 mg by mouth once daily.    [DISCONTINUED] insulin NPH (NOVOLIN N) 100 unit/mL injection Inject 30 Units into the skin 2 (two) times daily before meals.    [DISCONTINUED] insulin NPH-insulin regular, 70/30, (NOVOLIN 70/30) 100 unit/mL (70-30) injection Inject 30 Units into the skin 2 (two) times daily.    [DISCONTINUED] insulin regular 100 unit/mL Inj injection Inject 10 Units into the skin 2 (two) times daily before meals.     Family History     Problem Relation (Age of Onset)    Brain cancer Brother (54)    Esophageal cancer Paternal Grandfather (87)        Tobacco Use    Smoking status: Former Smoker    Smokeless tobacco: Not on file   Substance and Sexual Activity    Alcohol use: Yes     Alcohol/week: 0.0 standard drinks    Drug use: No    Sexual activity: Not on file     Review of Systems   Constitutional: Negative for chills, diaphoresis,  fatigue and fever.   HENT: Negative for congestion, ear pain, sore throat and trouble swallowing.    Eyes: Negative for pain, discharge and visual disturbance.   Respiratory: Negative for cough, chest tightness, shortness of breath and wheezing.    Cardiovascular: Negative for chest pain, palpitations and leg swelling.   Gastrointestinal: Negative for abdominal distention, abdominal pain, blood in stool, constipation, diarrhea, nausea and vomiting.   Endocrine: Negative for polydipsia, polyphagia and polyuria.   Genitourinary: Negative for dysuria, flank pain, frequency and urgency.   Musculoskeletal: Positive for arthralgias. Negative for back pain, joint swelling, neck pain and neck stiffness.   Skin: Positive for wound. Negative for rash.   Allergic/Immunologic: Negative for immunocompromised state.   Neurological: Negative for dizziness, syncope, speech difficulty, weakness, light-headedness, numbness and headaches.   Hematological: Negative for adenopathy.   Psychiatric/Behavioral: Negative for confusion and suicidal ideas. The patient is not nervous/anxious.    All other systems reviewed and are negative.    Objective:     Vital Signs (Most Recent):  Temp: 98.2 °F (36.8 °C) (02/14/22 1740)  Pulse: 91 (02/14/22 2200)  Resp: 20 (02/14/22 1740)  BP: 134/73 (02/14/22 2200)  SpO2: 99 % (02/14/22 2200) Vital Signs (24h Range):  Temp:  [98.2 °F (36.8 °C)] 98.2 °F (36.8 °C)  Pulse:  [91-99] 91  Resp:  [20] 20  SpO2:  [97 %-99 %] 99 %  BP: (128-134)/(73-74) 134/73     Weight: 113.4 kg (250 lb)  Body mass index is 32.98 kg/m².    Physical Exam  Vitals and nursing note reviewed.   Constitutional:       Appearance: He is well-developed and well-nourished. He is obese.   HENT:      Head: Normocephalic and atraumatic.   Eyes:      Extraocular Movements: EOM normal.      Conjunctiva/sclera: Conjunctivae normal.      Pupils: Pupils are equal, round, and reactive to light.   Cardiovascular:      Rate and Rhythm: Normal rate  and regular rhythm.      Pulses: Intact distal pulses.      Heart sounds: Murmur heard.        Comments: bilateral pedal pulses 1+  Pulmonary:      Effort: Pulmonary effort is normal.      Breath sounds: Normal breath sounds.   Abdominal:      General: Bowel sounds are normal.      Palpations: Abdomen is soft.   Musculoskeletal:         General: Normal range of motion.      Cervical back: Normal range of motion and neck supple.   Skin:     General: Skin is warm and dry.      Capillary Refill: Capillary refill takes less than 2 seconds.      Findings: Lesion present.      Comments: Left great toe is pale ulceration the inner lateral side draining purulence drainage ulcerations on the plantar surfaces well please see picture for further details   Neurological:      Mental Status: He is alert and oriented to person, place, and time.   Psychiatric:         Mood and Affect: Mood and affect normal.         Behavior: Behavior normal.         Thought Content: Thought content normal.         Judgment: Judgment normal.           CRANIAL NERVES     CN III, IV, VI   Pupils are equal, round, and reactive to light.  Extraocular motions are normal.        Significant Labs:   All pertinent labs within the past 24 hours have been reviewed.  CBC:   Recent Labs   Lab 02/14/22 2123   WBC 11.55   HGB 11.0*   HCT 36.4*   *     CMP:   Recent Labs   Lab 02/14/22 2123   *   K 4.8   CL 92*   CO2 25   *   BUN 34*   CREATININE 1.9*   CALCIUM 9.1   PROT 9.0*   ALBUMIN 3.8   BILITOT 0.4   ALKPHOS 106   AST 15   ALT 13   ANIONGAP 12   EGFRNONAA 35.2*     Lactic Acid:   Recent Labs   Lab 02/14/22 2123   LACTATE 1.7       Significant Imaging: I have reviewed all pertinent imaging results/findings within the past 24 hours.  I have reviewed and interpreted all pertinent imaging results/findings within the past 24 hours.     X-Ray Foot Complete Left    Result Date: 2/14/2022  XR FOOT 3 OR MORE VIEWS CLINICAL HISTORY: 71 years  Male Left great toe pain from diabetic foot infection COMPARISON: None FINDINGS: Soft tissue swelling of the great toe, with soft tissue gas along the plantar aspect of the great toe. Osteolysis of great toe distal phalanx consistent with osteomyelitis, with associated pathologic fracture. There is also osteolysis along the lateral aspect of the great toe proximal phalangeal head, consistent with osteomyelitis. Mild great toe MTP osteoarthrosis. Second through fifth phalanges appear intact. IMPRESSION: Osteomyelitis of great toe proximal and distal phalanges. Pathologic fracture of great toe distal phalanx. Electronically signed by:  Efe Stroud MD  2/14/2022 7:16 PM [facility] Workstation: [id number]

## 2022-02-15 NOTE — PLAN OF CARE
[facility]  Initial Discharge Assessment       Primary Care Provider: Veena Stroud MD    Admission Diagnosis: Acute osteomyelitis [M86.10]    Admission Date: 2/14/2022  Expected Discharge Date:     Discharge Barriers Identified: None     Assessment completed at bedside with pt.  Discharge plan is to return home vs home health.  CM will continue to follow.      Payor: MEDICARE / Plan: MEDICARE PART A & B / Product Type: Government /     Extended Emergency Contact Information  Primary Emergency Contact: Jo Tomas  Address: [address] [place] [address] [place] [address] [address] [facility]  Mobile Phone: [phone]  Relation: Daughter  Preferred language: English    Discharge Plan A: Home  Discharge Plan B: Home Health      [facility] [id number] - [place], [place] - [address] AT [facility] [address]  [address]  [place] [address]  Phone: [phone] Fax: [phone]      Initial Assessment (most recent)     Adult Discharge Assessment - 02/15/22 1146        Discharge Assessment    Assessment Type Discharge Planning Assessment     Confirmed/corrected address, phone number and insurance Yes     Confirmed Demographics Correct on Facesheet     Source of Information patient     When was your last doctors appointment? --   approximately 1 month ago    Communicated NAJMA with patient/caregiver Date not available/Unable to determine     Reason For Admission osteomyelytis of the left great toe     Lives With alone     Do you expect to return to your current living situation? Yes     Do you have help at home or someone to help you manage your care at home? No     Prior to hospitilization cognitive status: Alert/Oriented     Current cognitive status: Alert/Oriented     Walking or Climbing Stairs Difficulty none     Dressing/Bathing Difficulty none     Home Layout Able to live on 1st floor     Equipment Currently Used at Home glucometer     Readmission within 30 days? No      Patient currently being followed by outpatient case management? No     Do you currently have service(s) that help you manage your care at home? No     Do you take prescription medications? Yes     Do you have prescription coverage? Yes     Coverage Medicare     Do you have any problems affording any of your prescribed medications? TBD     Is the patient taking medications as prescribed? yes     Who is going to help you get home at discharge? daughterJo, [phone]     How do you get to doctors appointments? car, drives self     Are you on dialysis? No     Do you take coumadin? No     Discharge Plan A Home     Discharge Plan B Home Health     DME Needed Upon Discharge  none     Discharge Plan discussed with: Patient     Discharge Barriers Identified None

## 2022-02-15 NOTE — CONSULTS
[facility]  Podiatry  Consult Note    Patient Name: Bandar Tomas  MRN: [id number]  Admission Date: 2/14/2022  Hospital Length of Stay: 1 days  Attending Physician: Perry Valadez MD  Primary Care Provider: Veena Stroud MD     Inpatient consult to Podiatry  Consult performed by: Jesse Rodríguez DPM  Consult ordered by: Shanthi Gibbons NP        Subjective:     History of Present Illness:  71-year-old male with past medical history of hypertension, hyperlipidemia, diabetes, diabetic neuropathy admitted for left great toe infection.  Patient states it started approximately 2 weeks ago.  He states that he believes it started from boots he was wearing.  Patient does not follow with a podiatrist outpatient.  He states that when the wound was worsening he went to the pharmacy and asked the pharmacist for recommendations.  The pharmacist recommended triple antibiotic and soaking the toe.    Scheduled Meds:   aspirin  81 mg Oral Daily    ceFEPime (MAXIPIME) IVPB  1 g Intravenous Q8H    enoxaparin  40 mg Subcutaneous Daily    hydrALAZINE  25 mg Oral Q8H    [START ON 2/16/2022] insulin detemir U-100  50 Units Subcutaneous Daily    pravastatin  40 mg Oral QHS    senna-docusate 8.6-50 mg  1 tablet Oral BID    vancomycin (VANCOCIN) IVPB  1,750 mg Intravenous Q24H     Continuous Infusions:   lactated ringers 100 mL/hr at 02/15/22 0914     PRN Meds:acetaminophen, calcium chloride IVPB, calcium chloride IVPB, calcium chloride IVPB, cloNIDine, dextrose 50%, dextrose 50%, glucagon (human recombinant), glucose, glucose, HYDROcodone-acetaminophen, insulin regular, magnesium oxide, magnesium sulfate IVPB, magnesium sulfate IVPB, magnesium sulfate IVPB, magnesium sulfate IVPB, melatonin, morphine, naloxone, ondansetron, polyethylene glycol, potassium chloride in water, potassium chloride in water, potassium chloride in water, potassium chloride in water, potassium chloride, potassium chloride, potassium  chloride, potassium chloride, sodium chloride 0.9%, Pharmacy to dose Vancomycin consult **AND** vancomycin - pharmacy to dose    Review of patient's allergies indicates:  No Known Allergies     Past Medical History:   Diagnosis Date    Diabetes     Former smoker     HTN (hypertension)      Past Surgical History:   Procedure Laterality Date    COLONOSCOPY  prior to 2007    COLONOSCOPY N/A 3/31/2016    Procedure: COLONOSCOPY;  Surgeon: Perry Rodriguez MD;  Location: [facility];  Service: Endoscopy;  Laterality: N/A;    SHOULDER SURGERY Left     UPPER GASTROINTESTINAL ENDOSCOPY         Family History     Problem Relation (Age of Onset)    Brain cancer Brother (54)    Esophageal cancer Paternal Grandfather (87)        Tobacco Use    Smoking status: Former Smoker    Smokeless tobacco: Not on file   Substance and Sexual Activity    Alcohol use: Yes     Alcohol/week: 0.0 standard drinks    Drug use: No    Sexual activity: Not on file     Review of Systems   Constitutional: Negative for chills, fatigue, fever and unexpected weight change.   HENT: Negative for hearing loss and trouble swallowing.    Eyes: Negative for photophobia and visual disturbance.   Respiratory: Negative for cough, shortness of breath and wheezing.    Cardiovascular: Negative for chest pain, palpitations and leg swelling.   Gastrointestinal: Negative for abdominal pain and nausea.   Genitourinary: Negative for dysuria and frequency.   Musculoskeletal: Positive for joint swelling. Negative for arthralgias, back pain, gait problem and myalgias.   Skin: Positive for color change and wound. Negative for rash.   Neurological: Positive for numbness. Negative for tremors, seizures, weakness and headaches.   Hematological: Does not bruise/bleed easily.     Objective:     Vital Signs (Most Recent):  Temp: 98 °F (36.7 °C) (02/15/22 1118)  Pulse: 84 (02/15/22 1118)  Resp: 15 (02/15/22 1118)  BP: 130/79 (02/15/22 1118)  SpO2: 98 % (02/15/22 1118)  Vital Signs (24h Range):  Temp:  [98 °F (36.7 °C)-98.4 °F (36.9 °C)] 98 °F (36.7 °C)  Pulse:  [80-99] 84  Resp:  [15-20] 15  SpO2:  [97 %-99 %] 98 %  BP: (117-150)/(64-84) 130/79     Weight: 114 kg (251 lb 5.2 oz)  Body mass index is 33.16 kg/m².    Foot Exam    Laboratory:  A1C:   Recent Labs   Lab 02/15/22  0649   HGBA1C 16.4*     CBC:   Recent Labs   Lab 02/15/22  0649   WBC 9.61   RBC 4.27*   HGB 10.4*   HCT 34.0*   *   MCV 80*   MCH 24.4*   MCHC 30.6*     CMP:   Recent Labs   Lab 02/15/22  0649 02/15/22  0649 02/15/22  1149   *   < > 416*   CALCIUM 8.8  --   --    ALBUMIN 3.4*  --   --    PROT 7.9  --   --    *  --   --    K 4.7  --   --    CO2 25  --   --    CL 92*  --   --    BUN 30*  --   --    CREATININE 1.6*  --   --    ALKPHOS 95  --   --    ALT 14  --   --    AST 12  --   --    BILITOT 0.7  --   --     < > = values in this interval not displayed.     CRP:   Recent Labs   Lab 02/14/22 2123   CRP 3.97*     ESR: No results for input(s): SEDRATE in the last 168 hours.  Wound Cultures:   Recent Labs   Lab 02/14/22 2042   LABAERO GRAM NEGATIVE AUDELIA, NON-LACTOSE   Moderate  Identification and susceptibility pending  *       Diagnostic Results:  I have reviewed all pertinent imaging results/findings within the past 24 hours.     X-Ray Foot Complete Left  XR FOOT 3 OR MORE VIEWS    CLINICAL HISTORY:  71 years Male Left great toe pain from diabetic foot infection    COMPARISON: None    FINDINGS: Soft tissue swelling of the great toe, with soft tissue gas along the plantar aspect of the great toe. Osteolysis of great toe distal phalanx consistent with osteomyelitis, with associated pathologic fracture. There is also osteolysis along the lateral aspect of the great toe proximal phalangeal head, consistent with osteomyelitis. Mild great toe MTP osteoarthrosis. Second through fifth phalanges appear intact.    IMPRESSION:    Osteomyelitis of great toe proximal and distal  phalanges.  Pathologic fracture of great toe distal phalanx.    Electronically signed by:  Efe Stroud MD  2/14/2022 7:16 PM CST Workstation: [id number]        Clinical Findings:                  There is significant edema and erythema of the great toe extending to the metatarsophalangeal joint.  There is a large amount of tissue necrosis present.  Toe is malodorous.  Easily able to express purulence from the open wounds.  There is no significant tenderness to palpation.  Foot is warm to touch.    Assessment/Plan:     Active Diagnoses:    Diagnosis Date Noted POA    PRINCIPAL PROBLEM:  Osteomyelitis of great toe of left foot [M86.9] 02/14/2022 Yes    Diabetic ulcer of left foot [E11.621, L97.529] 02/14/2022 Yes    Uncontrolled type 2 diabetes mellitus [E11.65] 02/14/2022 Yes    Hypertension [I10] 02/14/2022 Yes    NATHALIE (acute kidney injury) [N17.9] 02/14/2022 Yes    Microcytic anemia [D50.9] 02/14/2022 Yes    Obesity (BMI 30.0-34.9) [E66.9] 02/14/2022 Yes      Problems Resolved During this Admission:       Patient has significant infection of the great toe.  I am ordering an MRI for further evaluation.  I did discuss with the patient that there is a very high probability he will require amputation of at least the great toe.  I also discussed with him that with his blood sugars as high as they are currently it will be extremely difficult, if not impossible, for him to heal.     Thank you for your consult. I will follow-up with patient. Please contact us if you have any additional questions.    Jesse Rodríguez DPM  Podiatry  [facility]

## 2022-02-15 NOTE — H&P (VIEW-ONLY)
[facility]  Podiatry  Consult Note    Patient Name: Bandar Tomas  MRN: [id number]  Admission Date: 2/14/2022  Hospital Length of Stay: 1 days  Attending Physician: Perry Valadez MD  Primary Care Provider: Veena Stroud MD     Inpatient consult to Podiatry  Consult performed by: Jesse Rodríguez DPM  Consult ordered by: Shanthi Gibbons NP        Subjective:     History of Present Illness:  71-year-old male with past medical history of hypertension, hyperlipidemia, diabetes, diabetic neuropathy admitted for left great toe infection.  Patient states it started approximately 2 weeks ago.  He states that he believes it started from boots he was wearing.  Patient does not follow with a podiatrist outpatient.  He states that when the wound was worsening he went to the pharmacy and asked the pharmacist for recommendations.  The pharmacist recommended triple antibiotic and soaking the toe.    Scheduled Meds:   aspirin  81 mg Oral Daily    ceFEPime (MAXIPIME) IVPB  1 g Intravenous Q8H    enoxaparin  40 mg Subcutaneous Daily    hydrALAZINE  25 mg Oral Q8H    [START ON 2/16/2022] insulin detemir U-100  50 Units Subcutaneous Daily    pravastatin  40 mg Oral QHS    senna-docusate 8.6-50 mg  1 tablet Oral BID    vancomycin (VANCOCIN) IVPB  1,750 mg Intravenous Q24H     Continuous Infusions:   lactated ringers 100 mL/hr at 02/15/22 0914     PRN Meds:acetaminophen, calcium chloride IVPB, calcium chloride IVPB, calcium chloride IVPB, cloNIDine, dextrose 50%, dextrose 50%, glucagon (human recombinant), glucose, glucose, HYDROcodone-acetaminophen, insulin regular, magnesium oxide, magnesium sulfate IVPB, magnesium sulfate IVPB, magnesium sulfate IVPB, magnesium sulfate IVPB, melatonin, morphine, naloxone, ondansetron, polyethylene glycol, potassium chloride in water, potassium chloride in water, potassium chloride in water, potassium chloride in water, potassium chloride, potassium chloride, potassium  chloride, potassium chloride, sodium chloride 0.9%, Pharmacy to dose Vancomycin consult **AND** vancomycin - pharmacy to dose    Review of patient's allergies indicates:  No Known Allergies     Past Medical History:   Diagnosis Date    Diabetes     Former smoker     HTN (hypertension)      Past Surgical History:   Procedure Laterality Date    COLONOSCOPY  prior to 2007    COLONOSCOPY N/A 3/31/2016    Procedure: COLONOSCOPY;  Surgeon: Perry Rodriguez MD;  [facility]: [facility];  Service: Endoscopy;  Laterality: N/A;    SHOULDER SURGERY Left     UPPER GASTROINTESTINAL ENDOSCOPY         Family History     Problem Relation (Age of Onset)    Brain cancer Brother (54)    Esophageal cancer Paternal Grandfather (87)        Tobacco Use    Smoking status: Former Smoker    Smokeless tobacco: Not on file   Substance and Sexual Activity    Alcohol use: Yes     Alcohol/week: 0.0 standard drinks    Drug use: No    Sexual activity: Not on file     Review of Systems   Constitutional: Negative for chills, fatigue, fever and unexpected weight change.   HENT: Negative for hearing loss and trouble swallowing.    Eyes: Negative for photophobia and visual disturbance.   Respiratory: Negative for cough, shortness of breath and wheezing.    Cardiovascular: Negative for chest pain, palpitations and leg swelling.   Gastrointestinal: Negative for abdominal pain and nausea.   Genitourinary: Negative for dysuria and frequency.   Musculoskeletal: Positive for joint swelling. Negative for arthralgias, back pain, gait problem and myalgias.   Skin: Positive for color change and wound. Negative for rash.   Neurological: Positive for numbness. Negative for tremors, seizures, weakness and headaches.   Hematological: Does not bruise/bleed easily.     Objective:     Vital Signs (Most Recent):  Temp: 98 °F (36.7 °C) (02/15/22 1118)  Pulse: 84 (02/15/22 1118)  Resp: 15 (02/15/22 1118)  BP: 130/79 (02/15/22 1118)  SpO2: 98 % (02/15/22 1118)  Vital Signs (24h Range):  Temp:  [98 °F (36.7 °C)-98.4 °F (36.9 °C)] 98 °F (36.7 °C)  Pulse:  [80-99] 84  Resp:  [15-20] 15  SpO2:  [97 %-99 %] 98 %  BP: (117-150)/(64-84) 130/79     Weight: 114 kg (251 lb 5.2 oz)  Body mass index is 33.16 kg/m².    Foot Exam    Laboratory:  A1C:   Recent Labs   Lab 02/15/22  0649   HGBA1C 16.4*     CBC:   Recent Labs   Lab 02/15/22  0649   WBC 9.61   RBC 4.27*   HGB 10.4*   HCT 34.0*   *   MCV 80*   MCH 24.4*   MCHC 30.6*     CMP:   Recent Labs   Lab 02/15/22  0649 02/15/22  0649 02/15/22  1149   *   < > 416*   CALCIUM 8.8  --   --    ALBUMIN 3.4*  --   --    PROT 7.9  --   --    *  --   --    K 4.7  --   --    CO2 25  --   --    CL 92*  --   --    BUN 30*  --   --    CREATININE 1.6*  --   --    ALKPHOS 95  --   --    ALT 14  --   --    AST 12  --   --    BILITOT 0.7  --   --     < > = values in this interval not displayed.     CRP:   Recent Labs   Lab 02/14/22 2123   CRP 3.97*     ESR: No results for input(s): SEDRATE in the last 168 hours.  Wound Cultures:   Recent Labs   Lab 02/14/22 2042   LABAERO GRAM NEGATIVE AUDELIA, NON-LACTOSE   Moderate  Identification and susceptibility pending  *       Diagnostic Results:  I have reviewed all pertinent imaging results/findings within the past 24 hours.     X-Ray Foot Complete Left  XR FOOT 3 OR MORE VIEWS    CLINICAL HISTORY:  71 years Male Left great toe pain from diabetic foot infection    COMPARISON: None    FINDINGS: Soft tissue swelling of the great toe, with soft tissue gas along the plantar aspect of the great toe. Osteolysis of great toe distal phalanx consistent with osteomyelitis, with associated pathologic fracture. There is also osteolysis along the lateral aspect of the great toe proximal phalangeal head, consistent with osteomyelitis. Mild great toe MTP osteoarthrosis. Second through fifth phalanges appear intact.    IMPRESSION:    Osteomyelitis of great toe proximal and distal  phalanges.  Pathologic fracture of great toe distal phalanx.    Electronically signed by:  Efe Stroud MD  2/14/2022 7:16 PM CST Workstation: [id number]        Clinical Findings:                  There is significant edema and erythema of the great toe extending to the metatarsophalangeal joint.  There is a large amount of tissue necrosis present.  Toe is malodorous.  Easily able to express purulence from the open wounds.  There is no significant tenderness to palpation.  Foot is warm to touch.    Assessment/Plan:     Active Diagnoses:    Diagnosis Date Noted POA    PRINCIPAL PROBLEM:  Osteomyelitis of great toe of left foot [M86.9] 02/14/2022 Yes    Diabetic ulcer of left foot [E11.621, L97.529] 02/14/2022 Yes    Uncontrolled type 2 diabetes mellitus [E11.65] 02/14/2022 Yes    Hypertension [I10] 02/14/2022 Yes    NATHALIE (acute kidney injury) [N17.9] 02/14/2022 Yes    Microcytic anemia [D50.9] 02/14/2022 Yes    Obesity (BMI 30.0-34.9) [E66.9] 02/14/2022 Yes      Problems Resolved During this Admission:       Patient has significant infection of the great toe.  I am ordering an MRI for further evaluation.  I did discuss with the patient that there is a very high probability he will require amputation of at least the great toe.  I also discussed with him that with his blood sugars as high as they are currently it will be extremely difficult, if not impossible, for him to heal.     Thank you for your consult. I will follow-up with patient. Please contact us if you have any additional questions.    Jesse Rodríguez DPM  Podiatry  [facility]

## 2022-02-15 NOTE — PLAN OF CARE
Problem: Adult Inpatient Plan of Care  Goal: Plan of Care Review  Outcome: Ongoing, Progressing  Goal: Patient-Specific Goal (Individualized)  Outcome: Ongoing, Progressing  Goal: Absence of Hospital-Acquired Illness or Injury  Outcome: Ongoing, Progressing  Goal: Optimal Comfort and Wellbeing  Outcome: Ongoing, Progressing  Goal: Readiness for Transition of Care  Outcome: Ongoing, Progressing     Problem: Fluid and Electrolyte Imbalance (Acute Kidney Injury/Impairment)  Goal: Fluid and Electrolyte Balance  Outcome: Ongoing, Progressing     Problem: Oral Intake Inadequate (Acute Kidney Injury/Impairment)  Goal: Optimal Nutrition Intake  Outcome: Ongoing, Progressing     Problem: Renal Function Impairment (Acute Kidney Injury/Impairment)  Goal: Effective Renal Function  Outcome: Ongoing, Progressing     Problem: Diabetes Comorbidity  Goal: Blood Glucose Level Within Targeted Range  Outcome: Ongoing, Progressing

## 2022-02-15 NOTE — PROGRESS NOTES
VANCOMYCIN PHARMACOKINETIC NOTE:  Vancomycin Day # 1    Objective/Assessment:    Diagnosis/Indication for Vancomycin:Osteomyelitis      71 y.o., male; Actual Body Weight = 113.4 kg (250 lb).    The patient has the following labs:  2/15/2022 Estimated Creatinine Clearance: 48.4 mL/min (A) (based on SCr of 1.9 mg/dL (H)). Lab Results   Component Value Date    BUN 34 (H) 02/14/2022     Lab Results   Component Value Date    WBC 11.55 02/14/2022          Plan:  Adjust vancomycin dose and/or frequency based on the patient's actual weight and renal function:  Initiate Vancomycin 1750 mg IV every 24 hours, following the loading dose of 2000 mg.  Orders have been entered into patient's chart.        Vancomycin trough level has been ordered for 2/17 @ 2000, prior to 4th dose.     Pharmacy will manage vancomycin therapy, monitor serum vancomycin levels, monitor renal function and adjust regimen as necessary.    Thank you for allowing us to participate in this patient's care.     Salazar Ortiz 2/15/2022   Department of Pharmacy  Ext [phone]

## 2022-02-15 NOTE — ED PROVIDER NOTES
Encounter Date: 2/14/2022       History     Chief Complaint   Patient presents with    Toe Pain     States has an infected L great toe for 2 weeks and is a diabetic     Patient here reported toe pain infection to left great toe states he noticed a wound on the left great toe approximately 2 weeks ago went to the pharmacy they advised that he should placed Neosporin to the wound it should heal it and despite using the Neosporin states that infection is working worsened today he noticed pus draining from the wound and came to emergency department for further evaluation he denies any previous history of diabetic foot ulcers is uncertain however sugar has been running he does not believe he has had any fever no change in his appetite no other recent illness        Review of patient's allergies indicates:  No Known Allergies  Past Medical History:   Diagnosis Date    Diabetes     Former smoker     HTN (hypertension)      Past Surgical History:   Procedure Laterality Date    COLONOSCOPY  prior to 2007    COLONOSCOPY N/A 3/31/2016    Procedure: COLONOSCOPY;  Surgeon: Perry Rodriguez MD;  Location: [facility];  Service: Endoscopy;  Laterality: N/A;    SHOULDER SURGERY Left     UPPER GASTROINTESTINAL ENDOSCOPY       Family History   Problem Relation Age of Onset    Brain cancer Brother 54    Esophageal cancer Paternal Grandfather 87    Colon polyps Neg Hx      Social History     Tobacco Use    Smoking status: Former Smoker   Substance Use Topics    Alcohol use: Yes     Alcohol/week: 0.0 standard drinks    Drug use: No     Review of Systems   Musculoskeletal: Positive for arthralgias.   Skin: Positive for color change and wound.       Physical Exam     Initial Vitals [02/14/22 1740]   BP Pulse Resp Temp SpO2   128/74 99 20 98.2 °F (36.8 °C) 97 %      MAP       --         Physical Exam    Constitutional: He appears well-developed and well-nourished. No distress.   HENT:   Head: Normocephalic and atraumatic.    Right Ear: External ear normal.   Left Ear: External ear normal.   Mouth/Throat: Oropharynx is clear and moist.   Eyes: Pupils are equal, round, and reactive to light.   Neck: Neck supple.   Normal range of motion.  Cardiovascular: Normal rate, regular rhythm, S1 normal, S2 normal and intact distal pulses.   Abdominal: Abdomen is soft. Normal appearance and bowel sounds are normal. There is no abdominal tenderness.   Musculoskeletal:         General: No tenderness. Normal range of motion.      Cervical back: Normal range of motion and neck supple.      Comments: Ulcer to the great toe of the left foot with surrounding purulence and swelling erythema skin sloughing noted     Neurological: He is alert and oriented to person, place, and time. GCS eye subscore is 4. GCS verbal subscore is 5. GCS motor subscore is 6.   Skin: Skin is warm and dry. Capillary refill takes less than 2 seconds. No rash noted. There is erythema.   Psychiatric: He has a normal mood and affect. His behavior is normal.         ED Course   Procedures  Labs Reviewed   CBC W/ AUTO DIFFERENTIAL - Abnormal; Notable for the following components:       Result Value    Hemoglobin 11.0 (*)     Hematocrit 36.4 (*)     MCV 79 (*)     MCH 23.9 (*)     MCHC 30.2 (*)     RDW 15.1 (*)     Platelets 544 (*)     Gran # (ANC) 9.0 (*)     Gran % 77.7 (*)     Lymph % 15.1 (*)     All other components within normal limits   COMPREHENSIVE METABOLIC PANEL - Abnormal; Notable for the following components:    Sodium 129 (*)     Chloride 92 (*)     Glucose 355 (*)     BUN 34 (*)     Creatinine 1.9 (*)     Total Protein 9.0 (*)     eGFR if  40.7 (*)     eGFR if non  35.2 (*)     All other components within normal limits   C-REACTIVE PROTEIN - Abnormal; Notable for the following components:    CRP 3.97 (*)     All other components within normal limits   CULTURE, BLOOD   CULTURE, BLOOD   CULTURE, AEROBIC  (SPECIFY SOURCE)   LACTIC ACID,  PLASMA   C-REACTIVE PROTEIN   PROCALCITONIN   SARS-COV-2 RNA AMPLIFICATION, QUAL   PROCALCITONIN          Imaging Results          X-Ray Foot Complete Left (Final result)  Result time 02/14/22 19:16:15    Final result by Efe Stroud MD (02/14/22 19:16:15)                 Narrative:    XR FOOT 3 OR MORE VIEWS    CLINICAL HISTORY:  71 years Male Left great toe pain from diabetic foot infection    COMPARISON: None    FINDINGS: Soft tissue swelling of the great toe, with soft tissue gas along the plantar aspect of the great toe. Osteolysis of great toe distal phalanx consistent with osteomyelitis, with associated pathologic fracture. There is also osteolysis along the lateral aspect of the great toe proximal phalangeal head, consistent with osteomyelitis. Mild great toe MTP osteoarthrosis. Second through fifth phalanges appear intact.    IMPRESSION:    Osteomyelitis of great toe proximal and distal phalanges.  Pathologic fracture of great toe distal phalanx.    Electronically signed by:  Efe Stroud MD  2/14/2022 7:16 PM [facility] Workstation: [id number]                               Medications   vancomycin - pharmacy to dose (has no administration in time range)   aspirin chewable tablet 81 mg (has no administration in time range)   sodium chloride 0.9% flush 10 mL (has no administration in time range)   glucose chewable tablet 16 g (has no administration in time range)   glucose chewable tablet 24 g (has no administration in time range)   dextrose 50% injection 12.5 g (has no administration in time range)   dextrose 50% injection 25 g (has no administration in time range)   glucagon (human recombinant) injection 1 mg (has no administration in time range)   naloxone 0.4 mg/mL injection 0.02 mg (has no administration in time range)   enoxaparin injection 40 mg (has no administration in time range)   acetaminophen tablet 650 mg (has no administration in time range)   HYDROcodone-acetaminophen 5-325 mg per tablet 1  tablet (has no administration in time range)   morphine injection 4 mg (has no administration in time range)   senna-docusate 8.6-50 mg per tablet 1 tablet (has no administration in time range)   polyethylene glycol packet 17 g (has no administration in time range)   ondansetron injection 4 mg (has no administration in time range)   insulin aspart U-100 pen 0-5 Units (has no administration in time range)   melatonin tablet 6 mg (has no administration in time range)   potassium chloride SA CR tablet 20 mEq (has no administration in time range)   potassium chloride SA CR tablet 40 mEq (has no administration in time range)   potassium chloride SA CR tablet 20 mEq (has no administration in time range)   potassium chloride SA CR tablet 40 mEq (has no administration in time range)   potassium chloride 10 mEq in 100 mL IVPB (has no administration in time range)   potassium chloride 10 mEq in 100 mL IVPB (has no administration in time range)   potassium chloride 10 mEq in 100 mL IVPB (has no administration in time range)   potassium chloride 10 mEq in 100 mL IVPB (has no administration in time range)   magnesium oxide tablet 800 mg (has no administration in time range)   magnesium sulfate 2g in water 50mL IVPB (premix) (has no administration in time range)   magnesium sulfate in dextrose IVPB (premix) 1 g (has no administration in time range)   magnesium sulfate 2g in water 50mL IVPB (premix) (has no administration in time range)   magnesium sulfate 2g in water 50mL IVPB (premix) (has no administration in time range)   calcium chloride 1 g in dextrose 5 % 100 mL IVPB (has no administration in time range)   calcium chloride 1 g in dextrose 5 % 100 mL IVPB (has no administration in time range)   calcium chloride 1 g in dextrose 5 % 100 mL IVPB (has no administration in time range)   cefepime in dextrose 5 % 1 gram/50 mL IVPB 1 g (1 g Intravenous New Bag 2/15/22 0133)   piperacillin-tazobactam 3.375 g in dextrose 5 % 50 mL  IVPB (ready to mix system) (0 g Intravenous Stopped 2/14/22 2150)   vancomycin in dextrose 5 % 1 gram/250 mL IVPB 1,000 mg (0 mg Intravenous Stopped 2/14/22 2245)     And   vancomycin in dextrose 5 % 1 gram/250 mL IVPB 1,000 mg (0 mg Intravenous Stopped 2/14/22 2245)     Medical Decision Making:   ED Management:  I have obtained cultures the patient's wound bed he has received Zosyn and vanc in the emergency department there is radiographic evidence of osteomyelitis I have discussed patient's findings with the hospitalist who will evaluate patient emergency department for admission                      Clinical Impression:   Final diagnoses:  [M79.676] Toe pain  [M86.10] Acute osteomyelitis          ED Disposition Condition    Admit               Kenton Biswas MD  02/15/22 0143

## 2022-02-16 DIAGNOSIS — M86.9 OSTEOMYELITIS OF GREAT TOE OF LEFT FOOT: Primary | ICD-10-CM

## 2022-02-16 LAB
ALBUMIN SERPL BCP-MCNC: 3.2 G/DL (ref 3.5–5.2)
ALP SERPL-CCNC: 83 U/L (ref 55–135)
ALT SERPL W/O P-5'-P-CCNC: 11 U/L (ref 10–44)
ANION GAP SERPL CALC-SCNC: 10 MMOL/L (ref 8–16)
AST SERPL-CCNC: 12 U/L (ref 10–40)
BACTERIA SPEC AEROBE CULT: ABNORMAL
BASOPHILS # BLD AUTO: 0.04 K/UL (ref 0–0.2)
BASOPHILS NFR BLD: 0.6 % (ref 0–1.9)
BILIRUB SERPL-MCNC: 0.9 MG/DL (ref 0.1–1)
BUN SERPL-MCNC: 26 MG/DL (ref 8–23)
CALCIUM SERPL-MCNC: 8.8 MG/DL (ref 8.7–10.5)
CHLORIDE SERPL-SCNC: 97 MMOL/L (ref 95–110)
CO2 SERPL-SCNC: 26 MMOL/L (ref 23–29)
CREAT SERPL-MCNC: 1.4 MG/DL (ref 0.5–1.4)
DIFFERENTIAL METHOD: ABNORMAL
EOSINOPHIL # BLD AUTO: 0.1 K/UL (ref 0–0.5)
EOSINOPHIL NFR BLD: 0.7 % (ref 0–8)
ERYTHROCYTE [DISTWIDTH] IN BLOOD BY AUTOMATED COUNT: 15.3 % (ref 11.5–14.5)
EST. GFR  (AFRICAN AMERICAN): 58.8 ML/MIN/1.73 M^2
EST. GFR  (NON AFRICAN AMERICAN): 50.9 ML/MIN/1.73 M^2
GLUCOSE SERPL-MCNC: 230 MG/DL (ref 70–110)
GLUCOSE SERPL-MCNC: 255 MG/DL (ref 70–110)
GLUCOSE SERPL-MCNC: 298 MG/DL (ref 70–110)
GLUCOSE SERPL-MCNC: 335 MG/DL (ref 70–110)
GLUCOSE SERPL-MCNC: 339 MG/DL (ref 70–110)
HCT VFR BLD AUTO: 33.6 % (ref 40–54)
HGB BLD-MCNC: 9.9 G/DL (ref 14–18)
IMM GRANULOCYTES # BLD AUTO: 0.03 K/UL (ref 0–0.04)
IMM GRANULOCYTES NFR BLD AUTO: 0.4 % (ref 0–0.5)
LYMPHOCYTES # BLD AUTO: 1.5 K/UL (ref 1–4.8)
LYMPHOCYTES NFR BLD: 20.7 % (ref 18–48)
MAGNESIUM SERPL-MCNC: 2.1 MG/DL (ref 1.6–2.6)
MCH RBC QN AUTO: 24.2 PG (ref 27–31)
MCHC RBC AUTO-ENTMCNC: 29.5 G/DL (ref 32–36)
MCV RBC AUTO: 82 FL (ref 82–98)
MONOCYTES # BLD AUTO: 0.6 K/UL (ref 0.3–1)
MONOCYTES NFR BLD: 8.2 % (ref 4–15)
NEUTROPHILS # BLD AUTO: 4.9 K/UL (ref 1.8–7.7)
NEUTROPHILS NFR BLD: 69.4 % (ref 38–73)
NRBC BLD-RTO: 0 /100 WBC
PLATELET # BLD AUTO: 415 K/UL (ref 150–450)
PMV BLD AUTO: 11.1 FL (ref 9.2–12.9)
POTASSIUM SERPL-SCNC: 4.7 MMOL/L (ref 3.5–5.1)
PROT SERPL-MCNC: 7.8 G/DL (ref 6–8.4)
RBC # BLD AUTO: 4.09 M/UL (ref 4.6–6.2)
SODIUM SERPL-SCNC: 133 MMOL/L (ref 136–145)
WBC # BLD AUTO: 7.11 K/UL (ref 3.9–12.7)

## 2022-02-16 PROCEDURE — 12000002 HC ACUTE/MED SURGE SEMI-PRIVATE ROOM

## 2022-02-16 PROCEDURE — 99232 PR SUBSEQUENT HOSPITAL CARE,LEVL II: ICD-10-PCS | Mod: ,,, | Performed by: PODIATRIST

## 2022-02-16 PROCEDURE — 36415 COLL VENOUS BLD VENIPUNCTURE: CPT | Performed by: NURSE PRACTITIONER

## 2022-02-16 PROCEDURE — 63600175 PHARM REV CODE 636 W HCPCS: Performed by: FAMILY MEDICINE

## 2022-02-16 PROCEDURE — 63600175 PHARM REV CODE 636 W HCPCS: Performed by: NURSE PRACTITIONER

## 2022-02-16 PROCEDURE — 99232 SBSQ HOSP IP/OBS MODERATE 35: CPT | Mod: ,,, | Performed by: PODIATRIST

## 2022-02-16 PROCEDURE — 25000003 PHARM REV CODE 250: Performed by: NURSE PRACTITIONER

## 2022-02-16 PROCEDURE — 83735 ASSAY OF MAGNESIUM: CPT | Performed by: NURSE PRACTITIONER

## 2022-02-16 PROCEDURE — 25000003 PHARM REV CODE 250: Performed by: FAMILY MEDICINE

## 2022-02-16 PROCEDURE — 80053 COMPREHEN METABOLIC PANEL: CPT | Performed by: NURSE PRACTITIONER

## 2022-02-16 PROCEDURE — 85025 COMPLETE CBC W/AUTO DIFF WBC: CPT | Performed by: NURSE PRACTITIONER

## 2022-02-16 RX ORDER — SODIUM CHLORIDE, SODIUM LACTATE, POTASSIUM CHLORIDE, CALCIUM CHLORIDE 600; 310; 30; 20 MG/100ML; MG/100ML; MG/100ML; MG/100ML
INJECTION, SOLUTION INTRAVENOUS CONTINUOUS
Status: DISCONTINUED | OUTPATIENT
Start: 2022-02-16 | End: 2022-02-17

## 2022-02-16 RX ADMIN — MELATONIN 6 MG: at 08:02

## 2022-02-16 RX ADMIN — HUMAN INSULIN 12 UNITS: 100 INJECTION, SOLUTION SUBCUTANEOUS at 11:02

## 2022-02-16 RX ADMIN — SENNOSIDES AND DOCUSATE SODIUM 1 TABLET: 50; 8.6 TABLET ORAL at 09:02

## 2022-02-16 RX ADMIN — HUMAN INSULIN 12 UNITS: 100 INJECTION, SOLUTION SUBCUTANEOUS at 08:02

## 2022-02-16 RX ADMIN — ASPIRIN 81 MG 81 MG: 81 TABLET ORAL at 09:02

## 2022-02-16 RX ADMIN — HYDRALAZINE HYDROCHLORIDE 25 MG: 25 TABLET ORAL at 02:02

## 2022-02-16 RX ADMIN — CEFEPIME HYDROCHLORIDE 1 G: 1 INJECTION, SOLUTION INTRAVENOUS at 12:02

## 2022-02-16 RX ADMIN — VANCOMYCIN HYDROCHLORIDE 1750 MG: 500 INJECTION, POWDER, LYOPHILIZED, FOR SOLUTION INTRAVENOUS at 08:02

## 2022-02-16 RX ADMIN — CEFEPIME HYDROCHLORIDE 1 G: 1 INJECTION, SOLUTION INTRAVENOUS at 03:02

## 2022-02-16 RX ADMIN — CEFEPIME HYDROCHLORIDE 1 G: 1 INJECTION, SOLUTION INTRAVENOUS at 09:02

## 2022-02-16 RX ADMIN — HUMAN INSULIN 9 UNITS: 100 INJECTION, SOLUTION SUBCUTANEOUS at 09:02

## 2022-02-16 RX ADMIN — SENNOSIDES AND DOCUSATE SODIUM 1 TABLET: 50; 8.6 TABLET ORAL at 08:02

## 2022-02-16 RX ADMIN — HYDRALAZINE HYDROCHLORIDE 25 MG: 25 TABLET ORAL at 06:02

## 2022-02-16 RX ADMIN — ENOXAPARIN SODIUM 40 MG: 40 INJECTION SUBCUTANEOUS at 05:02

## 2022-02-16 RX ADMIN — HUMAN INSULIN 6 UNITS: 100 INJECTION, SOLUTION SUBCUTANEOUS at 05:02

## 2022-02-16 RX ADMIN — HYDRALAZINE HYDROCHLORIDE 25 MG: 25 TABLET ORAL at 09:02

## 2022-02-16 RX ADMIN — PRAVASTATIN SODIUM 40 MG: 40 TABLET ORAL at 08:02

## 2022-02-16 RX ADMIN — HYDROCODONE BITARTRATE AND ACETAMINOPHEN 1 TABLET: 5; 325 TABLET ORAL at 08:02

## 2022-02-16 RX ADMIN — SODIUM CHLORIDE, SODIUM LACTATE, POTASSIUM CHLORIDE, AND CALCIUM CHLORIDE: .6; .31; .03; .02 INJECTION, SOLUTION INTRAVENOUS at 03:02

## 2022-02-16 NOTE — PROGRESS NOTES
"[facility]  Adult Nutrition   Progress Note (Initial Assessment)     SUMMARY     Recommendations/Interventions:    Recommendation/Intervention:  1. Recommend Cardiac, Diabetic 2000 kcal diet to meet needs.   2. Recommend William BID for wound healing and Glucerna TID meals for BG control.  Communication of RD Recs: reviewed with RN    Goal: New  1. Patient will consume 75% or greater po meals and supplements. 2. Labs trend to target range.    Dietitian Rounds Brief:    RD screen for nutrition risk. Patient with wound-osteomyelitis left great toe with uncontrolled DM2 aeb A1c 16.4. Patient not available for interview at visit. RD increased kcal to 2000/day to help meet needs and added William BID for healing  BG and POC glucose > 250. A1c 16.4. RD to follow for diet education. Intake < optimal. RD to Glucerna BID to assist with BG control.    Reason for Assessment  Reason For Assessment: identified at risk by screening criteria  Relevant Medical History: DM2, HTN, HLD.  Interdisciplinary Rounds: did not attend    Nutrition Risk Screen  Nutrition Risk Screen: large or nonhealing wound, burn or pressure injury       Wound 02/15/22 0915 Ulceration Left anterior;posterior;medial;lateral Toe, first-Wound Image: Images linked  MST Score: 0  Have you recently lost weight without trying?: No  Weight loss score: 0  Have you been eating poorly because of a decreased appetite?: No  Appetite score: 0       Nutrition/Diet History  Food Allergies: NKFA    Anthropometrics  Temp: 98.8 °F (37.1 °C)  Height Method: Stated  Height: 6' 1" (185.4 cm)  Height (inches): 73 in  Weight Method: Bed Scale  Weight: 114 kg (251 lb 5.2 oz)  Weight (lb): 251.33 lb  Ideal Body Weight (IBW), Male: 184 lb  % Ideal Body Weight, Male (lb): 136.59 %  BMI (Calculated): 33.2       Weight History:  Wt Readings from Last 10 Encounters:   02/15/22 114 kg (251 lb 5.2 oz)   03/31/16 113.4 kg (250 lb)   03/15/16 116.1 kg (255 lb 15.3 oz) "   }  Lab/Procedures/Meds: Pertinent Labs Reviewed  Clinical Chemistry:  Recent Labs   Lab 02/16/22  0619   *   K 4.7   CL 97   CO2 26   *   BUN 26*   CREATININE 1.4   CALCIUM 8.8   PROT 7.8   ALBUMIN 3.2*   BILITOT 0.9   ALKPHOS 83   AST 12   ALT 11   ANIONGAP 10   ESTGFRAFRICA 58.8*   EGFRNONAA 50.9*   MG 2.1     CBC:   Recent Labs   Lab 02/16/22  0619   WBC 7.11   RBC 4.09*   HGB 9.9*   HCT 33.6*      MCV 82   MCH 24.2*   MCHC 29.5*     Inflammatory Labs:  Recent Labs   Lab 02/14/22  2123   CRP 3.97*     Diabetes:  Recent Labs   Lab 02/15/22  0649   HGBA1C 16.4*     Medications: Pertinent Medications reviewed  Scheduled Meds:   aspirin  81 mg Oral Daily    ceFEPime (MAXIPIME) IVPB  1 g Intravenous Q8H    enoxaparin  40 mg Subcutaneous Daily    hydrALAZINE  25 mg Oral Q8H    [START ON 2/17/2022] insulin detemir U-100  60 Units Subcutaneous Daily    pravastatin  40 mg Oral QHS    senna-docusate 8.6-50 mg  1 tablet Oral BID    vancomycin (VANCOCIN) IVPB  1,750 mg Intravenous Q24H     Continuous Infusions:   lactated ringers       PRN Meds:.acetaminophen, calcium chloride IVPB, calcium chloride IVPB, calcium chloride IVPB, cloNIDine, dextrose 50%, dextrose 50%, glucagon (human recombinant), glucose, glucose, HYDROcodone-acetaminophen, insulin regular, magnesium oxide, magnesium sulfate IVPB, magnesium sulfate IVPB, magnesium sulfate IVPB, magnesium sulfate IVPB, melatonin, morphine, naloxone, ondansetron, polyethylene glycol, potassium chloride in water, potassium chloride in water, potassium chloride in water, potassium chloride in water, potassium chloride, potassium chloride, potassium chloride, potassium chloride, sodium chloride 0.9%, Pharmacy to dose Vancomycin consult **AND** vancomycin - pharmacy to dose    Antibiotics (From admission, onward)            Start     Stop Route Frequency Ordered    02/15/22 2100  vancomycin (VANCOCIN) 1,750 mg in dextrose 5 % 500 mL IVPB         -- IV  "Every 24 hours (non-standard times) 02/15/22 0148    02/15/22 0030  cefepime in dextrose 5 % 1 gram/50 mL IVPB 1 g         -- IV Every 8 hours (non-standard times) 02/14/22 2313    02/14/22 2010  vancomycin - pharmacy to dose  (vancomycin IVPB)        "And" Linked Group Details    -- IV pharmacy to manage frequency 02/14/22 1911           Estimated/Assessed Needs  Weight Used For Calorie Calculations: 114 kg (251 lb 5.2 oz)  Energy Calorie Requirements (kcal): 3588-5328 kcal/day (18-20kcal/kg)  Energy Need Method: Kcal/kg  Protein Requirements: 114-126gms/day (1.0-1.5 gms gms/kg)  Weight Used For Protein Calculations: 84 kg (185 lb 3 oz) (IBW)  Fluid Requirements (mL): 20ml/kg (2280ml/day)  Estimated Fluid Requirement Method: RDA Method  RDA Method (mL): 2052  CHO Requirement: 256gms, 17 cho servings/day    Nutrition Prescription Ordered    Current Diet Order: Diabetic 1800 kcal diet    Evaluation of Received Nutrient/Fluid Intake    IV Fluid (mL): 2400  Energy Calories Required: not meeting needs  Protein Required: not meeting needs  Fluid Required: meeting needs  Tolerance: tolerating  % Intake of Estimated Energy Needs: 0 - 25 %  % Meal Intake: 0 - 25 %    Intake/Output Summary (Last 24 hours) at 2/16/2022 1420  Last data filed at 2/16/2022 0600  Gross per 24 hour   Intake 1001.67 ml   Output 1050 ml   Net -48.33 ml      Nutrition Risk    Level of Risk/Frequency of Follow-up: moderate - high   Monitor and Evaluation    Food and Nutrient Intake: energy intake,food and beverage intake  Knowledge/Beliefs/Attitudes: food and nutrition knowledge/skill  Anthropometric Measurements: weight,weight change,body mass index  Biochemical Data, Medical Tests and Procedures: electrolyte and renal panel,gastrointestinal profile,glucose/endocrine profile,inflammatory profile,lipid profile  Nutrition-Focused Physical Findings: overall appearance     Nutrition Follow-Up    RD Follow-up?: Yes    Janelle Mckenna RD, LDN 02/16/2022 " 10:20 AM

## 2022-02-16 NOTE — PROGRESS NOTES
[facility] Medicine  Progress Note    Patient name: Bandar Tomas  MRN: [id number]  Admit Date: 2/14/2022   LOS: 2 days     SUBJECTIVE:     Principal problem: Osteomyelitis of great toe of left foot    Interval History:  Afebrile the past 24 hours on IV antibiotics.  No leukocytosis.  NATHALIE improved on IV fluids.  Wound culture grew Proteus sensitive to cefepime.  Blood cultures remain no growth to date.  Feels well.  Likely will require amputation.  HbA1c 16. Counseled at length regarding importance of diabetic management and keeping log book of blood sugars when patient discharged home with close follow-up with PCP.    Hospital course  Patient was admitted for nonseptic osteomyelitis of the right great toe and NATHALIE. Patient was empirically started on IV antibiotics, IV fluids.  Wound culture grew Proteus.  Blood cultures remain no growth during hospitalization.    Scheduled Meds:   aspirin  81 mg Oral Daily    ceFEPime (MAXIPIME) IVPB  1 g Intravenous Q8H    enoxaparin  40 mg Subcutaneous Daily    hydrALAZINE  25 mg Oral Q8H    [START ON 2/17/2022] insulin detemir U-100  60 Units Subcutaneous Daily    pravastatin  40 mg Oral QHS    senna-docusate 8.6-50 mg  1 tablet Oral BID    vancomycin (VANCOCIN) IVPB  1,750 mg Intravenous Q24H     Continuous Infusions:   lactated ringers       PRN Meds:acetaminophen, calcium chloride IVPB, calcium chloride IVPB, calcium chloride IVPB, cloNIDine, dextrose 50%, dextrose 50%, glucagon (human recombinant), glucose, glucose, HYDROcodone-acetaminophen, insulin regular, magnesium oxide, magnesium sulfate IVPB, magnesium sulfate IVPB, magnesium sulfate IVPB, magnesium sulfate IVPB, melatonin, morphine, naloxone, ondansetron, polyethylene glycol, potassium chloride in water, potassium chloride in water, potassium chloride in water, potassium chloride in water, potassium chloride, potassium chloride, potassium chloride, potassium chloride, sodium chloride  0.9%, Pharmacy to dose Vancomycin consult **AND** vancomycin - pharmacy to dose    Review of patient's allergies indicates:  No Known Allergies    Review of Systems  As per subjective    OBJECTIVE:     Vital Signs (Most Recent)  Temp: 98.8 °F (37.1 °C) (02/16/22 1143)  Pulse: 90 (02/16/22 1143)  Resp: 19 (02/16/22 1143)  BP: (!) 149/98 (nurse notified) (02/16/22 1143)  SpO2: (!) 90 % (02/16/22 1143)    Vital Signs Range (Last 24H):  Temp:  [97.6 °F (36.4 °C)-99 °F (37.2 °C)]   Pulse:  [63-90]   Resp:  [18-19]   BP: (124-155)/(81-98)   SpO2:  [90 %-100 %]     I & O (Last 24H):    Intake/Output Summary (Last 24 hours) at 2/16/2022 1417  Last data filed at 2/16/2022 0600  Gross per 24 hour   Intake 1001.67 ml   Output 1050 ml   Net -48.33 ml       Physical Exam:  General: Patient resting comfortably in no acute distress. Appears as stated age. Calm. Obese  Eyes: EOM intact. No conjunctivae injection. No scleral icterus.  ENT: Hearing grossly intact. No discharge from ears. No nasal discharge.   CVS: RRR. No LE edema BL.  Lungs: CTA BL, no wheezing or crackles. Good breath sounds. No accessory muscle use. No acute respiratory distress  Neuro: Alert. Cranial nerves grossly intact. Moves all extremities equally. Follows commands. Responds appropriately   Psych: Mood, behavior, thought content and judgement normal  Skin: Foul smelling left great toe, dry diabetic ulcer on bottom right foot            Laboratory:  All pertinent labs within the past 24 hours have been reviewed.  CBC:   Recent Labs   Lab 02/14/22  2123 02/15/22  0649 02/16/22  0619   WBC 11.55 9.61 7.11   HGB 11.0* 10.4* 9.9*   HCT 36.4* 34.0* 33.6*   * 455* 415     CMP:   Recent Labs   Lab 02/14/22  2123 02/14/22  2123 02/15/22  0649 02/15/22  1149 02/16/22  0619   *  --  128*  --  133*   K 4.8  --  4.7  --  4.7   CL 92*  --  92*  --  97   CO2 25  --  25  --  26   *   < > 382* 416* 255*   BUN 34*  --  30*  --  26*   CREATININE 1.9*  --   1.6*  --  1.4   CALCIUM 9.1  --  8.8  --  8.8   PROT 9.0*  --  7.9  --  7.8   ALBUMIN 3.8  --  3.4*  --  3.2*   BILITOT 0.4  --  0.7  --  0.9   ALKPHOS 106  --  95  --  83   AST 15  --  12  --  12   ALT 13  --  14  --  11   ANIONGAP 12  --  11  --  10   EGFRNONAA 35.2*  --  43.3*  --  50.9*    < > = values in this interval not displayed.       Microbiology Results (last 7 days)     Procedure Component Value Units Date/Time    Aerobic culture [[id number]]  (Abnormal)  (Susceptibility) Collected: 02/14/22 2042    Order Status: Completed Specimen: Wound from Toe, Left Foot Updated: 02/16/22 0651     Aerobic Bacterial Culture PROTEUS VULGARIS  Moderate      Blood culture #2 **CANNOT BE ORDERED STAT** [[id number]] Collected: 02/14/22 2139    Order Status: Completed Specimen: Blood from Peripheral, Upper Arm, Right Updated: 02/16/22 0232     Blood Culture, Routine No Growth to date      No Growth to date    Blood culture #1 **CANNOT BE ORDERED STAT** [[id number]] Collected: 02/14/22 2124    Order Status: Completed Specimen: Blood from Peripheral, Upper Arm, Right Updated: 02/16/22 0232     Blood Culture, Routine No Growth to date      No Growth to date           Diagnostic Results:  MRI Foot (Forefoot) Left Without Contrast [[id number]] Collected: 02/15/22 1803   Order Status: Completed Updated: 02/15/22 1900   Narrative:     MRI of the left foot without contrast     Clinical history as osteomyelitis of the first digit     There is confluent low T1 increased T2 signal within the distal phalanx of the first digit and distal aspect of the proximal phalanx of the first digit with extensive cortical irregularity predominantly involving the distal phalanx and lateral aspect of the great toe proximal phalanx head. Findings are compatible with osteomyelitis.     There is extensive soft tissue swelling with subcutaneous edema of the great toe and ulceration anterior laterally. There is low signal in the interphalangeal joint of the  first digit suggestive of air. There is no abscess.     There is no additional marrow signal abnormality. There is fatty atrophy of the musculature.     IMPRESSION: Osteomyelitis with cortical erosion of the distal phalanx of the first digit as well as the distal aspect of the proximal phalanx of the first digit with moderate cellulitis and skin ulceration. There is low signal within the interphalangeal joints suggestive of air        US Lower Extremity Arteries Left [[id number]] Collected: 02/15/22 1428   Order Status: Completed Updated: 02/15/22 1608   Narrative:     CLINICAL HISTORY:   71 years (1950) Male osteomyelitis     TECHNIQUE:   US LOWER EXTREMITY ARTERIES LIMITED FOLLOW-UP UNILATERAL.  32 images obtained. Doppler sonographic ultrasound of the arteries of the left lower extremity was performed. Images obtained in grayscale and color with Doppler.     COMPARISON:   None available.     FINDINGS:   LEFT thigh:   Common femoral artery: (102 cm/s, peak systolic velocity) scattered calcified plaques in the arteries of the left lower extremity, with triphasic waveforms seen to the level of the calf.   Profunda: (88 cm/s) partially visualized but with normal color flow.   (Superficial) femoral artery:   - Proximal: (96 cm/s)   - Mid: (156 cm/s), slightly elevated.   - Distal: (68 cm/s)   Popliteal (88 cm/s)     LEFT calf:   Anterior tibial artery: (106 cm/s)   Posterior tibial artery: (75 cm/s)   Peroneal: (50 cm/s): The proximal peroneal shows minimal flow, with color flow seen distally a monophasic waveform   Dorsalis pedis artery: Obscured by bandage material.     IMPRESSION:   1. Likely clinically significant high-grade stenosis the origin of the peroneal, with monophasic waveforms seen distal to this point.   2. Nonvisualization of the dorsalis pedis artery.          ASSESSMENT/PLAN:     Active Hospital Problems    Diagnosis  POA    *Osteomyelitis of great toe of left foot [M86.9]  Yes    Diabetic  ulcer of left foot [E11.621, L97.529]  Yes    Uncontrolled type 2 diabetes mellitus [E11.65]  Yes    Hypertension [I10]  Yes    NATHALIE (acute kidney injury) [N17.9]  Yes    Microcytic anemia [D50.9]  Yes    Obesity (BMI 30.0-34.9) [E66.9]  Yes      Resolved Hospital Problems   No resolved problems to display.           Plan:   Empiric vancomycin, cefepime IV  BCx NGTD  Wound culture proteus sensitive to cefepime  Adjusted insulin regimen  Continue IVF  HbA1C 16.4  US left lower extremity arterial high-grade stenosis  Likely require amputation of right great toe  Wound care  Continue home medications. Holding nephrotoxic medications    Podiatry consult  Vascular consult    VTE Risk Mitigation (From admission, onward)         Ordered     enoxaparin injection 40 mg  Daily         02/14/22 2313     IP VTE HIGH RISK PATIENT  Once         02/14/22 2313     Place sequential compression device  Until discontinued         02/14/22 2313                        Patient care time was spent personally by me on the following activities: > 35 min  · Obtaining a history.  · Examination of patient.  · Providing medical care at the patients bedside.  · Developing a treatment plan with patient or surrogate and bedside caregivers.  · Ordering and reviewing laboratory studies, radiographic studies, pulse oximetry.  · Ordering and performing treatments and interventions.  · Evaluation of patient's response to treatment.  · Discussions with consultants while on the unit and immediately available to the patient.  · Re-evaluation of the patient's condition.  · Documentation in the medical record.       Department Hospital Medicine  [facility]  Perry Valadez MD    Please note: This note was transcribed using voice recognition software. Because of this technology, there are often uinintended grammatical, spelling, and other transcription errors. Please disregard these errors.

## 2022-02-16 NOTE — PROGRESS NOTES
[facility]  Podiatry  Progress Note    Patient Name: Bandar Tomas  MRN: [id number]  Admission Date: 2/14/2022  Hospital Length of Stay: 2 days  Attending Physician: Perry Valadez MD  Primary Care Provider: Veena Stroud MD     Subjective:     Interval History:  Patient seen at bedside.  No new complaints.  Denies any pain currently to his left foot are great toe.      Scheduled Meds:   aspirin  81 mg Oral Daily    ceFEPime (MAXIPIME) IVPB  1 g Intravenous Q8H    enoxaparin  40 mg Subcutaneous Daily    hydrALAZINE  25 mg Oral Q8H    [START ON 2/17/2022] insulin detemir U-100  60 Units Subcutaneous Daily    pravastatin  40 mg Oral QHS    senna-docusate 8.6-50 mg  1 tablet Oral BID    vancomycin (VANCOCIN) IVPB  1,750 mg Intravenous Q24H     Continuous Infusions:   lactated ringers       PRN Meds:acetaminophen, calcium chloride IVPB, calcium chloride IVPB, calcium chloride IVPB, cloNIDine, dextrose 50%, dextrose 50%, glucagon (human recombinant), glucose, glucose, HYDROcodone-acetaminophen, insulin regular, magnesium oxide, magnesium sulfate IVPB, magnesium sulfate IVPB, magnesium sulfate IVPB, magnesium sulfate IVPB, melatonin, morphine, naloxone, ondansetron, polyethylene glycol, potassium chloride in water, potassium chloride in water, potassium chloride in water, potassium chloride in water, potassium chloride, potassium chloride, potassium chloride, potassium chloride, sodium chloride 0.9%, Pharmacy to dose Vancomycin consult **AND** vancomycin - pharmacy to dose    Review of Systems   Constitutional: Negative for chills, fatigue, fever and unexpected weight change.   HENT: Negative for hearing loss and trouble swallowing.    Eyes: Negative for photophobia and visual disturbance.   Respiratory: Negative for cough, shortness of breath and wheezing.    Cardiovascular: Negative for chest pain, palpitations and leg swelling.   Gastrointestinal: Negative for abdominal pain and nausea.    Genitourinary: Negative for dysuria and frequency.   Musculoskeletal: Positive for joint swelling. Negative for arthralgias, back pain, gait problem and myalgias.   Skin: Positive for color change and wound. Negative for rash.   Neurological: Positive for numbness. Negative for tremors, seizures, weakness and headaches.   Hematological: Does not bruise/bleed easily.     Objective:     Vital Signs (Most Recent):  Temp: 98.8 °F (37.1 °C) (02/16/22 1143)  Pulse: 90 (02/16/22 1143)  Resp: 19 (02/16/22 1143)  BP: (!) 149/98 (nurse notified) (02/16/22 1143)  SpO2: (!) 90 % (02/16/22 1143) Vital Signs (24h Range):  Temp:  [97.6 °F (36.4 °C)-99 °F (37.2 °C)] 98.8 °F (37.1 °C)  Pulse:  [63-90] 90  Resp:  [18-19] 19  SpO2:  [90 %-100 %] 90 %  BP: (124-155)/(81-98) 149/98     Weight: 114 kg (251 lb 5.2 oz)  Body mass index is 33.16 kg/m².    Foot Exam    Laboratory:  A1C:   Recent Labs   Lab 02/15/22  0649   HGBA1C 16.4*     Blood Cultures:   Recent Labs   Lab 02/14/22 2124 02/14/22 2139   LABBLOO No Growth to date  No Growth to date No Growth to date  No Growth to date     CBC:   Recent Labs   Lab 02/16/22  0619   WBC 7.11   RBC 4.09*   HGB 9.9*   HCT 33.6*      MCV 82   MCH 24.2*   MCHC 29.5*     CMP:   Recent Labs   Lab 02/16/22 0619   *   CALCIUM 8.8   ALBUMIN 3.2*   PROT 7.8   *   K 4.7   CO2 26   CL 97   BUN 26*   CREATININE 1.4   ALKPHOS 83   ALT 11   AST 12   BILITOT 0.9     CRP:   Recent Labs   Lab 02/14/22 2123   CRP 3.97*     ESR: No results for input(s): SEDRATE in the last 168 hours.  Wound Cultures:   Recent Labs   Lab 02/14/22 2042   LABAERO PROTEUS VULGARIS  Moderate  *       Diagnostic Results:  I have reviewed all pertinent imaging results/findings within the past 24 hours.     MRI Foot (Forefoot) Left Without Contrast  MRI of the left foot without contrast    Clinical history as osteomyelitis of the first digit    There is confluent low T1 increased T2 signal within the distal  phalanx of the first digit and distal aspect of the proximal phalanx of the first digit with extensive cortical irregularity predominantly involving the distal phalanx and lateral aspect of the great toe proximal phalanx head. Findings are compatible with osteomyelitis.    There is extensive soft tissue swelling with subcutaneous edema of the great toe and ulceration anterior laterally. There is low signal in the interphalangeal joint of the first digit suggestive of air. There is no abscess.    There is no additional marrow signal abnormality. There is fatty atrophy of the musculature.    IMPRESSION: Osteomyelitis with cortical erosion of the distal phalanx of the first digit as well as the distal aspect of the proximal phalanx of the first digit with moderate cellulitis and skin ulceration. There is low signal within the interphalangeal joints suggestive of air    Electronically signed by:  Amina Hdz MD  2/15/2022 6:58 PM CST Workstation: [id number]  US Lower Extremity Arteries Left  CLINICAL HISTORY:  71 years (1950) Male osteomyelitis    TECHNIQUE:  US LOWER EXTREMITY ARTERIES LIMITED FOLLOW-UP UNILATERAL.  32 images obtained. Doppler sonographic ultrasound of the arteries of the left lower extremity was performed. Images obtained in grayscale and color with Doppler.    COMPARISON:  None available.    FINDINGS:  LEFT thigh:  Common femoral artery: (102 cm/s, peak systolic velocity) scattered calcified plaques in the arteries of the left lower extremity, with triphasic waveforms seen to the level of the calf.  Profunda: (88 cm/s) partially visualized but with normal color flow.  (Superficial) femoral artery:  - Proximal: (96 cm/s)  - Mid: (156 cm/s), slightly elevated.  - Distal: (68 cm/s)  Popliteal (88 cm/s)    LEFT calf:  Anterior tibial artery: (106 cm/s)  Posterior tibial artery: (75 cm/s)  Peroneal: (50 cm/s): The proximal peroneal shows minimal flow, with color flow seen distally a  monophasic waveform  Dorsalis pedis artery: Obscured by bandage material.    IMPRESSION:  1. Likely clinically significant high-grade stenosis the origin of the peroneal, with monophasic waveforms seen distal to this point.  2. Nonvisualization of the dorsalis pedis artery.    Electronically signed by:  Tobin Arroyo MD  2/15/2022 4:03 PM [facility] Workstation: [id number]        Clinical Findings:                  Assessment/Plan:     Active Diagnoses:    Diagnosis Date Noted POA    PRINCIPAL PROBLEM:  Osteomyelitis of great toe of left foot [M86.9] 02/14/2022 Yes    Diabetic ulcer of left foot [E11.621, L97.529] 02/14/2022 Yes    Uncontrolled type 2 diabetes mellitus [E11.65] 02/14/2022 Yes    Hypertension [I10] 02/14/2022 Yes    NATHALIE (acute kidney injury) [N17.9] 02/14/2022 Yes    Microcytic anemia [D50.9] 02/14/2022 Yes    Obesity (BMI 30.0-34.9) [E66.9] 02/14/2022 Yes      Problems Resolved During this Admission:       I reviewed the MRI findings with the patient.  I explained the extensive infection that he has in his toe and the degree of tissue loss does not give many options for conservative treatment.  Due to the extent of the infection I recommend amputation of the great toe.  I did explain to the patient that given the degree of tissue death it may not be possible to fully primarily close the incision.  I explained that he may require a wound VAC and other wound care postoperatively to heal the remainder of the wound.  Risk and benefits were discussed.  Patient agreed with proceeding with amputation of the great toe.  Patient will be scheduled for tomorrow NPO after midnight.    Jesse Rodríguez DPM  Podiatry  [facility]

## 2022-02-16 NOTE — PLAN OF CARE
Problem: Skin Injury Risk Increased  Goal: Skin Health and Integrity  Intervention: Promote and Optimize Oral Intake  Flowsheets (Taken 2/16/2022 1428)  Oral Nutrition Promotion: calorie-dense liquids provided     Problem: Oral Intake Inadequate  Goal: Improved Oral Intake  Intervention: Promote and Optimize Oral Intake  Flowsheets (Taken 2/16/2022 1428)  Oral Nutrition Promotion: calorie-dense liquids provided

## 2022-02-16 NOTE — PLAN OF CARE
Problem: Adult Inpatient Plan of Care  Goal: Plan of Care Review  Outcome: Ongoing, Progressing  Goal: Patient-Specific Goal (Individualized)  Outcome: Ongoing, Progressing  Goal: Absence of Hospital-Acquired Illness or Injury  Outcome: Ongoing, Progressing  Goal: Optimal Comfort and Wellbeing  Outcome: Ongoing, Progressing  Goal: Readiness for Transition of Care  Outcome: Ongoing, Progressing     Problem: Fluid and Electrolyte Imbalance (Acute Kidney Injury/Impairment)  Goal: Fluid and Electrolyte Balance  Outcome: Ongoing, Progressing     Problem: Oral Intake Inadequate (Acute Kidney Injury/Impairment)  Goal: Optimal Nutrition Intake  Outcome: Ongoing, Progressing     Problem: Renal Function Impairment (Acute Kidney Injury/Impairment)  Goal: Effective Renal Function  Outcome: Ongoing, Progressing     Problem: Diabetes Comorbidity  Goal: Blood Glucose Level Within Targeted Range  Outcome: Ongoing, Progressing     Problem: Impaired Wound Healing  Goal: Optimal Wound Healing  Outcome: Ongoing, Progressing

## 2022-02-17 ENCOUNTER — ANESTHESIA (OUTPATIENT)
Dept: SURGERY | Facility: HOSPITAL | Age: 72
DRG: 617 | End: 2022-02-17
Payer: MEDICARE

## 2022-02-17 ENCOUNTER — ANESTHESIA EVENT (OUTPATIENT)
Dept: SURGERY | Facility: HOSPITAL | Age: 72
DRG: 617 | End: 2022-02-17
Payer: MEDICARE

## 2022-02-17 LAB
ALBUMIN SERPL BCP-MCNC: 3.1 G/DL (ref 3.5–5.2)
ALP SERPL-CCNC: 82 U/L (ref 55–135)
ALT SERPL W/O P-5'-P-CCNC: 11 U/L (ref 10–44)
ANION GAP SERPL CALC-SCNC: 12 MMOL/L (ref 8–16)
AST SERPL-CCNC: 11 U/L (ref 10–40)
BASOPHILS # BLD AUTO: 0.05 K/UL (ref 0–0.2)
BASOPHILS NFR BLD: 0.6 % (ref 0–1.9)
BILIRUB SERPL-MCNC: 0.7 MG/DL (ref 0.1–1)
BUN SERPL-MCNC: 21 MG/DL (ref 8–23)
CALCIUM SERPL-MCNC: 9 MG/DL (ref 8.7–10.5)
CHLORIDE SERPL-SCNC: 98 MMOL/L (ref 95–110)
CO2 SERPL-SCNC: 25 MMOL/L (ref 23–29)
CREAT SERPL-MCNC: 1.2 MG/DL (ref 0.5–1.4)
DIFFERENTIAL METHOD: ABNORMAL
EOSINOPHIL # BLD AUTO: 0.1 K/UL (ref 0–0.5)
EOSINOPHIL NFR BLD: 1.3 % (ref 0–8)
ERYTHROCYTE [DISTWIDTH] IN BLOOD BY AUTOMATED COUNT: 15.2 % (ref 11.5–14.5)
EST. GFR  (AFRICAN AMERICAN): >60 ML/MIN/1.73 M^2
EST. GFR  (NON AFRICAN AMERICAN): >60 ML/MIN/1.73 M^2
GLUCOSE SERPL-MCNC: 143 MG/DL (ref 70–110)
GLUCOSE SERPL-MCNC: 206 MG/DL (ref 70–110)
GLUCOSE SERPL-MCNC: 242 MG/DL (ref 70–110)
GLUCOSE SERPL-MCNC: 265 MG/DL (ref 70–110)
GLUCOSE SERPL-MCNC: 285 MG/DL (ref 70–110)
HCT VFR BLD AUTO: 31.9 % (ref 40–54)
HGB BLD-MCNC: 9.5 G/DL (ref 14–18)
IMM GRANULOCYTES # BLD AUTO: 0.03 K/UL (ref 0–0.04)
IMM GRANULOCYTES NFR BLD AUTO: 0.4 % (ref 0–0.5)
LYMPHOCYTES # BLD AUTO: 1.7 K/UL (ref 1–4.8)
LYMPHOCYTES NFR BLD: 22.2 % (ref 18–48)
MAGNESIUM SERPL-MCNC: 2 MG/DL (ref 1.6–2.6)
MCH RBC QN AUTO: 24.4 PG (ref 27–31)
MCHC RBC AUTO-ENTMCNC: 29.8 G/DL (ref 32–36)
MCV RBC AUTO: 82 FL (ref 82–98)
MONOCYTES # BLD AUTO: 0.7 K/UL (ref 0.3–1)
MONOCYTES NFR BLD: 8.7 % (ref 4–15)
NEUTROPHILS # BLD AUTO: 5.2 K/UL (ref 1.8–7.7)
NEUTROPHILS NFR BLD: 66.8 % (ref 38–73)
NRBC BLD-RTO: 0 /100 WBC
PLATELET # BLD AUTO: 409 K/UL (ref 150–450)
PMV BLD AUTO: 10.9 FL (ref 9.2–12.9)
POTASSIUM SERPL-SCNC: 4.2 MMOL/L (ref 3.5–5.1)
PROT SERPL-MCNC: 7.2 G/DL (ref 6–8.4)
RBC # BLD AUTO: 3.9 M/UL (ref 4.6–6.2)
SODIUM SERPL-SCNC: 135 MMOL/L (ref 136–145)
WBC # BLD AUTO: 7.84 K/UL (ref 3.9–12.7)

## 2022-02-17 PROCEDURE — 83735 ASSAY OF MAGNESIUM: CPT | Performed by: NURSE PRACTITIONER

## 2022-02-17 PROCEDURE — 36000707: Performed by: PODIATRIST

## 2022-02-17 PROCEDURE — 12000002 HC ACUTE/MED SURGE SEMI-PRIVATE ROOM

## 2022-02-17 PROCEDURE — 28820 AMPUTATION OF TOE: CPT | Mod: TA,,, | Performed by: PODIATRIST

## 2022-02-17 PROCEDURE — 87070 CULTURE OTHR SPECIMN AEROBIC: CPT | Performed by: PODIATRIST

## 2022-02-17 PROCEDURE — 37000009 HC ANESTHESIA EA ADD 15 MINS: Performed by: PODIATRIST

## 2022-02-17 PROCEDURE — 25000003 PHARM REV CODE 250: Performed by: NURSE PRACTITIONER

## 2022-02-17 PROCEDURE — 87186 SC STD MICRODIL/AGAR DIL: CPT | Mod: 59 | Performed by: PODIATRIST

## 2022-02-17 PROCEDURE — 37000008 HC ANESTHESIA 1ST 15 MINUTES: Performed by: PODIATRIST

## 2022-02-17 PROCEDURE — 27000080 OPTIME MED/SURG SUP & DEVICES GENERAL CLASSIFICATION: Performed by: PODIATRIST

## 2022-02-17 PROCEDURE — 63600175 PHARM REV CODE 636 W HCPCS: Performed by: NURSE PRACTITIONER

## 2022-02-17 PROCEDURE — 36415 COLL VENOUS BLD VENIPUNCTURE: CPT | Performed by: NURSE PRACTITIONER

## 2022-02-17 PROCEDURE — 28820 PR AMPUTATION TOE,MT-P JT: ICD-10-PCS | Mod: TA,,, | Performed by: PODIATRIST

## 2022-02-17 PROCEDURE — 80053 COMPREHEN METABOLIC PANEL: CPT | Performed by: NURSE PRACTITIONER

## 2022-02-17 PROCEDURE — 99900031 HC PATIENT EDUCATION (STAT)

## 2022-02-17 PROCEDURE — 63600175 PHARM REV CODE 636 W HCPCS: Performed by: NURSE ANESTHETIST, CERTIFIED REGISTERED

## 2022-02-17 PROCEDURE — 87075 CULTR BACTERIA EXCEPT BLOOD: CPT | Performed by: PODIATRIST

## 2022-02-17 PROCEDURE — 36000706: Performed by: PODIATRIST

## 2022-02-17 PROCEDURE — 87077 CULTURE AEROBIC IDENTIFY: CPT | Performed by: PODIATRIST

## 2022-02-17 PROCEDURE — 25000003 PHARM REV CODE 250: Performed by: PODIATRIST

## 2022-02-17 PROCEDURE — 85025 COMPLETE CBC W/AUTO DIFF WBC: CPT | Performed by: NURSE PRACTITIONER

## 2022-02-17 PROCEDURE — 63600175 PHARM REV CODE 636 W HCPCS: Performed by: FAMILY MEDICINE

## 2022-02-17 RX ORDER — BUPIVACAINE HYDROCHLORIDE 5 MG/ML
INJECTION, SOLUTION EPIDURAL; INTRACAUDAL
Status: DISCONTINUED | OUTPATIENT
Start: 2022-02-17 | End: 2022-02-17 | Stop reason: HOSPADM

## 2022-02-17 RX ORDER — HYDROCODONE BITARTRATE AND ACETAMINOPHEN 10; 325 MG/1; MG/1
1 TABLET ORAL EVERY 4 HOURS PRN
Status: DISCONTINUED | OUTPATIENT
Start: 2022-02-17 | End: 2022-02-18

## 2022-02-17 RX ORDER — HYDROMORPHONE HYDROCHLORIDE 1 MG/ML
0.2 INJECTION, SOLUTION INTRAMUSCULAR; INTRAVENOUS; SUBCUTANEOUS EVERY 5 MIN PRN
Status: DISCONTINUED | OUTPATIENT
Start: 2022-02-17 | End: 2022-02-18

## 2022-02-17 RX ORDER — TRAMADOL HYDROCHLORIDE 50 MG/1
50 TABLET ORAL EVERY 4 HOURS PRN
Status: DISCONTINUED | OUTPATIENT
Start: 2022-02-17 | End: 2022-02-18

## 2022-02-17 RX ORDER — ONDANSETRON 4 MG/1
8 TABLET, ORALLY DISINTEGRATING ORAL EVERY 8 HOURS PRN
Status: DISCONTINUED | OUTPATIENT
Start: 2022-02-17 | End: 2022-02-21 | Stop reason: HOSPADM

## 2022-02-17 RX ORDER — HYDROCODONE BITARTRATE AND ACETAMINOPHEN 5; 325 MG/1; MG/1
1 TABLET ORAL EVERY 4 HOURS PRN
Status: DISCONTINUED | OUTPATIENT
Start: 2022-02-17 | End: 2022-02-18

## 2022-02-17 RX ORDER — PROPOFOL 10 MG/ML
VIAL (ML) INTRAVENOUS
Status: DISCONTINUED | OUTPATIENT
Start: 2022-02-17 | End: 2022-02-17

## 2022-02-17 RX ORDER — FENTANYL CITRATE 50 UG/ML
INJECTION, SOLUTION INTRAMUSCULAR; INTRAVENOUS
Status: DISCONTINUED | OUTPATIENT
Start: 2022-02-17 | End: 2022-02-17

## 2022-02-17 RX ORDER — ONDANSETRON 2 MG/ML
4 INJECTION INTRAMUSCULAR; INTRAVENOUS DAILY PRN
Status: DISCONTINUED | OUTPATIENT
Start: 2022-02-17 | End: 2022-02-21 | Stop reason: HOSPADM

## 2022-02-17 RX ORDER — SODIUM CHLORIDE 0.9 % (FLUSH) 0.9 %
10 SYRINGE (ML) INJECTION
Status: DISCONTINUED | OUTPATIENT
Start: 2022-02-17 | End: 2022-02-21 | Stop reason: HOSPADM

## 2022-02-17 RX ORDER — DIPHENHYDRAMINE HYDROCHLORIDE 50 MG/ML
12.5 INJECTION INTRAMUSCULAR; INTRAVENOUS
Status: DISCONTINUED | OUTPATIENT
Start: 2022-02-17 | End: 2022-02-18

## 2022-02-17 RX ORDER — OXYCODONE HYDROCHLORIDE 5 MG/1
5 TABLET ORAL
Status: DISCONTINUED | OUTPATIENT
Start: 2022-02-17 | End: 2022-02-18

## 2022-02-17 RX ORDER — SODIUM CHLORIDE, SODIUM LACTATE, POTASSIUM CHLORIDE, CALCIUM CHLORIDE 600; 310; 30; 20 MG/100ML; MG/100ML; MG/100ML; MG/100ML
INJECTION, SOLUTION INTRAVENOUS CONTINUOUS
Status: DISCONTINUED | OUTPATIENT
Start: 2022-02-17 | End: 2022-02-18

## 2022-02-17 RX ORDER — PROMETHAZINE HYDROCHLORIDE 25 MG/1
25 TABLET ORAL EVERY 6 HOURS PRN
Status: DISCONTINUED | OUTPATIENT
Start: 2022-02-17 | End: 2022-02-18

## 2022-02-17 RX ORDER — MEPERIDINE HYDROCHLORIDE 50 MG/ML
12.5 INJECTION INTRAMUSCULAR; INTRAVENOUS; SUBCUTANEOUS EVERY 10 MIN PRN
Status: ACTIVE | OUTPATIENT
Start: 2022-02-17 | End: 2022-02-17

## 2022-02-17 RX ADMIN — PRAVASTATIN SODIUM 40 MG: 40 TABLET ORAL at 08:02

## 2022-02-17 RX ADMIN — CEFEPIME HYDROCHLORIDE 1 G: 1 INJECTION, SOLUTION INTRAVENOUS at 12:02

## 2022-02-17 RX ADMIN — PROPOFOL 20 MG: 10 INJECTION, EMULSION INTRAVENOUS at 11:02

## 2022-02-17 RX ADMIN — FENTANYL CITRATE 100 MCG: 50 INJECTION INTRAMUSCULAR; INTRAVENOUS at 11:02

## 2022-02-17 RX ADMIN — HYDROCODONE BITARTRATE AND ACETAMINOPHEN 1 TABLET: 10; 325 TABLET ORAL at 08:02

## 2022-02-17 RX ADMIN — HYDROCODONE BITARTRATE AND ACETAMINOPHEN 1 TABLET: 10; 325 TABLET ORAL at 04:02

## 2022-02-17 RX ADMIN — HUMAN INSULIN 9 UNITS: 100 INJECTION, SOLUTION SUBCUTANEOUS at 08:02

## 2022-02-17 RX ADMIN — SODIUM CHLORIDE, SODIUM LACTATE, POTASSIUM CHLORIDE, AND CALCIUM CHLORIDE: .6; .31; .03; .02 INJECTION, SOLUTION INTRAVENOUS at 07:02

## 2022-02-17 RX ADMIN — HYDRALAZINE HYDROCHLORIDE 25 MG: 25 TABLET ORAL at 05:02

## 2022-02-17 RX ADMIN — HUMAN INSULIN 6 UNITS: 100 INJECTION, SOLUTION SUBCUTANEOUS at 08:02

## 2022-02-17 RX ADMIN — HYDRALAZINE HYDROCHLORIDE 25 MG: 25 TABLET ORAL at 02:02

## 2022-02-17 RX ADMIN — ONDANSETRON 4 MG: 2 INJECTION INTRAMUSCULAR; INTRAVENOUS at 11:02

## 2022-02-17 RX ADMIN — COLLAGENASE SANTYL: 250 OINTMENT TOPICAL at 05:02

## 2022-02-17 RX ADMIN — CEFEPIME HYDROCHLORIDE 1 G: 1 INJECTION, SOLUTION INTRAVENOUS at 04:02

## 2022-02-17 RX ADMIN — HYDRALAZINE HYDROCHLORIDE 25 MG: 25 TABLET ORAL at 10:02

## 2022-02-17 RX ADMIN — MORPHINE SULFATE 4 MG: 4 INJECTION, SOLUTION INTRAMUSCULAR; INTRAVENOUS at 05:02

## 2022-02-17 RX ADMIN — HUMAN INSULIN 9 UNITS: 100 INJECTION, SOLUTION SUBCUTANEOUS at 05:02

## 2022-02-17 RX ADMIN — SODIUM CHLORIDE, SODIUM LACTATE, POTASSIUM CHLORIDE, AND CALCIUM CHLORIDE: .6; .31; .03; .02 INJECTION, SOLUTION INTRAVENOUS at 11:02

## 2022-02-17 RX ADMIN — ENOXAPARIN SODIUM 40 MG: 40 INJECTION SUBCUTANEOUS at 05:02

## 2022-02-17 RX ADMIN — SENNOSIDES AND DOCUSATE SODIUM 1 TABLET: 50; 8.6 TABLET ORAL at 08:02

## 2022-02-17 NOTE — TRANSFER OF CARE
"Anesthesia Transfer of Care Note    Patient: Bandar Tomas    Procedure(s) Performed: Procedure(s) (LRB):  AMPUTATION, TOE (Left)    Patient location: OPS    Anesthesia Type: general    Transport from OR: Transported from OR on room air with adequate spontaneous ventilation    Post pain: adequate analgesia    Post assessment: no apparent anesthetic complications    Post vital signs: stable    Level of consciousness: awake and alert    Nausea/Vomiting: no nausea/vomiting    Complications: none    Transfer of care protocol was followed      Last vitals:   Visit Vitals  BP (!) 147/76   Pulse 92   Temp 36.8 °C (98.3 °F) (Oral)   Resp 18   Ht 6' 1" (1.854 m)   Wt 114 kg (251 lb 5.2 oz)   SpO2 98%   BMI 33.16 kg/m²     "

## 2022-02-17 NOTE — ANESTHESIA PREPROCEDURE EVALUATION
02/17/2022  Bandar Tomas is a 71 y.o., male.      Patient Active Problem List   Diagnosis    Osteomyelitis of great toe of left foot    Diabetic ulcer of left foot    Uncontrolled type 2 diabetes mellitus    Hypertension    NATHALIE (acute kidney injury)    Microcytic anemia    Obesity (BMI 30.0-34.9)       Past Surgical History:   Procedure Laterality Date    COLONOSCOPY  prior to 2007    COLONOSCOPY N/A 3/31/2016    Procedure: COLONOSCOPY;  Surgeon: Perry Rodriguez MD;  Location: [facility];  Service: Endoscopy;  Laterality: N/A;    SHOULDER SURGERY Left     UPPER GASTROINTESTINAL ENDOSCOPY          Tobacco Use:  The patient  reports that he has quit smoking. He does not have any smokeless tobacco history on file.     No results found for this or any previous visit.     Imaging Results          X-Ray Foot Complete Left (Final result)  Result time 02/14/22 19:16:15    Final result by Efe Stroud MD (02/14/22 19:16:15)                 Narrative:    XR FOOT 3 OR MORE VIEWS    CLINICAL HISTORY:  71 years Male Left great toe pain from diabetic foot infection    COMPARISON: None    FINDINGS: Soft tissue swelling of the great toe, with soft tissue gas along the plantar aspect of the great toe. Osteolysis of great toe distal phalanx consistent with osteomyelitis, with associated pathologic fracture. There is also osteolysis along the lateral aspect of the great toe proximal phalangeal head, consistent with osteomyelitis. Mild great toe MTP osteoarthrosis. Second through fifth phalanges appear intact.    IMPRESSION:    Osteomyelitis of great toe proximal and distal phalanges.  Pathologic fracture of great toe distal phalanx.    Electronically signed by:  Efe Stroud MD  2/14/2022 7:16 PM [facility] Workstation: [id number]                               Lab Results   Component Value Date    WBC 7.84  02/17/2022    HGB 9.5 (L) 02/17/2022    HCT 31.9 (L) 02/17/2022    MCV 82 02/17/2022     02/17/2022     BMP  Lab Results   Component Value Date     (L) 02/17/2022    K 4.2 02/17/2022    CL 98 02/17/2022    CO2 25 02/17/2022    BUN 21 02/17/2022    CREATININE 1.2 02/17/2022    CALCIUM 9.0 02/17/2022    ANIONGAP 12 02/17/2022     (H) 02/17/2022     (H) 02/16/2022     (H) 02/15/2022       No results found for this or any previous visit.            Anesthesia Evaluation    I have reviewed the Patient Summary Reports.    I have reviewed the Nursing Notes. I have reviewed the NPO Status.   I have reviewed the Medications.     Review of Systems  Anesthesia Hx:  No problems with previous Anesthesia  Denies Family Hx of Anesthesia complications.   Denies Personal Hx of Anesthesia complications.   Social:  Former Smoker    Hematology/Oncology:         -- Anemia:   Cardiovascular:   Exercise tolerance: good Hypertension    Pulmonary:  Pulmonary Normal    Renal/:   Chronic Renal Disease    Musculoskeletal:  Musculoskeletal Normal admitted for nonseptic osteomyelitis of the right great toe and NATHALIE.    Neurological:  Neurology Normal    Endocrine:   Diabetes, type 2    Psych:  Psychiatric Normal           Physical Exam  General:  Well nourished, Obesity    Airway/Jaw/Neck:  Airway Findings: Mouth Opening: Normal Tongue: Normal  Mallampati: II  TM Distance: Normal, at least 6 cm  Jaw/Neck Findings:  Neck ROM: Normal ROM     Eyes/Ears/Nose:  EYES/EARS/NOSE FINDINGS: Normal   Dental:  Dental Findings: In tact   Chest/Lungs:  Chest/Lungs Findings: Clear to auscultation, Normal Respiratory Rate     Heart/Vascular:  Heart Findings: Rate: Normal  Rhythm: Regular Rhythm  Sounds: Normal        Mental Status:  Mental Status Findings:  Cooperative, Alert and Oriented         Anesthesia Plan  Type of Anesthesia, risks & benefits discussed:  Anesthesia Type:  MAC    Patient's Preference:   Plan  Factors:          Intra-op Monitoring Plan: standard ASA monitors  Intra-op Monitoring Plan Comments:   Post Op Pain Control Plan:   Post Op Pain Control Plan Comments:     Induction:   IV  Beta Blocker:  Patient is not currently on a Beta-Blocker (No further documentation required).       Informed Consent: Patient understands risks and agrees with Anesthesia plan.  Questions answered. Anesthesia consent signed with patient.  ASA Score: 2     Day of Surgery Review of History & Physical: I have interviewed and examined the patient. I have reviewed the patient's H&P dated:  There are no significant changes.      Anesthesia Plan Notes: Local MAC         Ready For Surgery From Anesthesia Perspective.

## 2022-02-17 NOTE — PROGRESS NOTES
[facility] Medicine  Progress Note    Patient name: Bandar Tomas  MRN: [id number]  Admit Date: 2/14/2022   LOS: 3 days     SUBJECTIVE:     Principal problem: Osteomyelitis of great toe of left foot    Interval History:  Afebrile the past 24 hours on IV antibiotics.  No leukocytosis.  NATHALIE improving on IV fluids.  Blood cultures remain no growth to date.  Plan for OR today with podiatry. Feels well otherwise    Hospital course  Patient was admitted for nonseptic osteomyelitis of the right great toe and NATHALIE. Patient was empirically started on IV antibiotics, IV fluids.  Wound culture grew Proteus.  Blood cultures remain no growth during hospitalization.    Scheduled Meds:   aspirin  81 mg Oral Daily    ceFEPime (MAXIPIME) IVPB  1 g Intravenous Q8H    enoxaparin  40 mg Subcutaneous Daily    hydrALAZINE  25 mg Oral Q8H    insulin detemir U-100  60 Units Subcutaneous Daily    pravastatin  40 mg Oral QHS    senna-docusate 8.6-50 mg  1 tablet Oral BID    vancomycin (VANCOCIN) IVPB  1,750 mg Intravenous Q24H     Continuous Infusions:   lactated ringers       PRN Meds:acetaminophen, calcium chloride IVPB, calcium chloride IVPB, calcium chloride IVPB, cloNIDine, dextrose 10%, dextrose 10%, glucagon (human recombinant), glucose, glucose, HYDROcodone-acetaminophen, insulin regular, magnesium oxide, magnesium sulfate IVPB, magnesium sulfate IVPB, magnesium sulfate IVPB, magnesium sulfate IVPB, melatonin, morphine, naloxone, ondansetron, polyethylene glycol, potassium chloride in water, potassium chloride in water, potassium chloride in water, potassium chloride in water, potassium chloride, potassium chloride, potassium chloride, potassium chloride, sodium chloride 0.9%, Pharmacy to dose Vancomycin consult **AND** vancomycin - pharmacy to dose    Review of patient's allergies indicates:  No Known Allergies    Review of Systems  As per subjective    OBJECTIVE:     Vital Signs (Most Recent)  Temp:  98.3 °F (36.8 °C) (02/17/22 0713)  Pulse: 92 (02/17/22 0713)  Resp: 18 (02/17/22 0713)  BP: (!) 147/76 (02/17/22 0713)  SpO2: 98 % (02/17/22 0713)    Vital Signs Range (Last 24H):  Temp:  [97.3 °F (36.3 °C)-98.8 °F (37.1 °C)]   Pulse:  [66-92]   Resp:  [16-19]   BP: (133-151)/(73-98)   SpO2:  [90 %-99 %]     I & O (Last 24H):    Intake/Output Summary (Last 24 hours) at 2/17/2022 0832  Last data filed at 2/17/2022 0059  Gross per 24 hour   Intake 806.67 ml   Output 800 ml   Net 6.67 ml       Physical Exam:  General: Patient resting comfortably in no acute distress. Appears as stated age. Calm. Obese  Eyes: EOM intact. No conjunctivae injection. No scleral icterus.  ENT: Hearing grossly intact. No discharge from ears. No nasal discharge.   CVS: RRR. No LE edema BL.  Lungs: CTA BL, no wheezing or crackles. Good breath sounds. No accessory muscle use. No acute respiratory distress  Neuro: Alert. Cranial nerves grossly intact. Moves all extremities equally. Follows commands. Responds appropriately   Psych: Mood, behavior, thought content and judgement normal  Skin: Foul smelling left great toe, dry diabetic ulcer on bottom right foot            Laboratory:  All pertinent labs within the past 24 hours have been reviewed.  CBC:   Recent Labs   Lab 02/16/22 0619 02/17/22 0448   WBC 7.11 7.84   HGB 9.9* 9.5*   HCT 33.6* 31.9*    409     CMP:   Recent Labs   Lab 02/15/22  1149 02/16/22 0619 02/17/22 0448   NA  --  133* 135*   K  --  4.7 4.2   CL  --  97 98   CO2  --  26 25   * 255* 143*   BUN  --  26* 21   CREATININE  --  1.4 1.2   CALCIUM  --  8.8 9.0   PROT  --  7.8 7.2   ALBUMIN  --  3.2* 3.1*   BILITOT  --  0.9 0.7   ALKPHOS  --  83 82   AST  --  12 11   ALT  --  11 11   ANIONGAP  --  10 12   EGFRNONAA  --  50.9* >60.0       Microbiology Results (last 7 days)     Procedure Component Value Units Date/Time    Blood culture #1 **CANNOT BE ORDERED STAT** [[id number]] Collected: 02/14/22 2124    Order Status:  Completed Specimen: Blood from Peripheral, Upper Arm, Right Updated: 02/17/22 0232     Blood Culture, Routine No Growth to date      No Growth to date      No Growth to date    Blood culture #2 **CANNOT BE ORDERED STAT** [[id number]] Collected: 02/14/22 2139    Order Status: Completed Specimen: Blood from Peripheral, Upper Arm, Right Updated: 02/17/22 0232     Blood Culture, Routine No Growth to date      No Growth to date      No Growth to date    Aerobic culture [[id number]]  (Abnormal)  (Susceptibility) Collected: 02/14/22 2042    Order Status: Completed Specimen: Wound from Toe, Left Foot Updated: 02/16/22 0651     Aerobic Bacterial Culture PROTEUS VULGARIS  Moderate             Diagnostic Results:        ASSESSMENT/PLAN:     Active Hospital Problems    Diagnosis  POA    *Osteomyelitis of great toe of left foot [M86.9]  Yes    Diabetic ulcer of left foot [E11.621, L97.529]  Yes    Uncontrolled type 2 diabetes mellitus [E11.65]  Yes    Hypertension [I10]  Yes    NATHALIE (acute kidney injury) [N17.9]  Yes    Microcytic anemia [D50.9]  Yes    Obesity (BMI 30.0-34.9) [E66.9]  Yes      Resolved Hospital Problems   No resolved problems to display.           Plan:   Empiric cefepime IV  D/C vancomycin IV  BCx NGTD  Wound culture proteus sensitive to cefepime  Continue IVF  HbA1C 16.4  US left lower extremity arterial high-grade stenosis  Plan for OR today with podiatry  Consider PT after surgery pending on podiatry weight bearing status  Wound care  Continue home medications. Holding nephrotoxic medications    Podiatry consult  Vascular consult      VTE Risk Mitigation (From admission, onward)         Ordered     enoxaparin injection 40 mg  Daily         02/14/22 2313     IP VTE HIGH RISK PATIENT  Once         02/14/22 2313     Place sequential compression device  Until discontinued         02/14/22 2313                        Patient care time was spent personally by me on the following activities: > 35  min  · Obtaining a history.  · Examination of patient.  · Providing medical care at the patients bedside.  · Developing a treatment plan with patient or surrogate and bedside caregivers.  · Ordering and reviewing laboratory studies, radiographic studies, pulse oximetry.  · Ordering and performing treatments and interventions.  · Evaluation of patient's response to treatment.  · Discussions with consultants while on the unit and immediately available to the patient.  · Re-evaluation of the patient's condition.  · Documentation in the medical record.       Department Hospital Medicine  [facility]  Perry Valadez MD    Please note: This note was transcribed using voice recognition software. Because of this technology, there are often uinintended grammatical, spelling, and other transcription errors. Please disregard these errors.

## 2022-02-17 NOTE — PLAN OF CARE
Problem: Adult Inpatient Plan of Care  Goal: Plan of Care Review  Outcome: Ongoing, Progressing  Goal: Patient-Specific Goal (Individualized)  Outcome: Ongoing, Progressing  Goal: Absence of Hospital-Acquired Illness or Injury  Outcome: Ongoing, Progressing  Goal: Optimal Comfort and Wellbeing  Outcome: Ongoing, Progressing  Goal: Readiness for Transition of Care  Outcome: Ongoing, Progressing     Problem: Fluid and Electrolyte Imbalance (Acute Kidney Injury/Impairment)  Goal: Fluid and Electrolyte Balance  Outcome: Ongoing, Progressing     Problem: Oral Intake Inadequate (Acute Kidney Injury/Impairment)  Goal: Optimal Nutrition Intake  Outcome: Ongoing, Progressing     Problem: Renal Function Impairment (Acute Kidney Injury/Impairment)  Goal: Effective Renal Function  Outcome: Ongoing, Progressing     Problem: Diabetes Comorbidity  Goal: Blood Glucose Level Within Targeted Range  Outcome: Ongoing, Progressing     Problem: Impaired Wound Healing  Goal: Optimal Wound Healing  Outcome: Ongoing, Progressing     Problem: Skin Injury Risk Increased  Goal: Skin Health and Integrity  Outcome: Ongoing, Progressing     Problem: Oral Intake Inadequate  Goal: Improved Oral Intake  Outcome: Ongoing, Progressing     Problem: Fall Injury Risk  Goal: Absence of Fall and Fall-Related Injury  Outcome: Ongoing, Progressing

## 2022-02-17 NOTE — PROGRESS NOTES
Therapy with Vancomycin complete and / or consult / order discontinued by Dr. Valadez on 02/17/2022 @ 10:54   Pharmacy will sign off, please re-consult as needed.  Thank you for allowing us to participate in this patient's care.  Aileen Armenta 2/17/2022 11:05 AM  Dept of Pharmacy  Ext [phone]

## 2022-02-17 NOTE — PLAN OF CARE
Important Message from Medicare was sign, explained and given to patient/caregiver on 02/17/2022 at 9:30am     addressed any questions or concerns.    Important Message from Medicare document will be scanned into patient's medical record

## 2022-02-17 NOTE — OP NOTE
Operative Report     Patient name: Bandar Tomas   MRN: [id number]  Date of surgery: 2/17/2022    Surgeon: Jesse Rodríguez DPM   Assistant:  None    Preoperative diagnosis:  1.  Osteomyelitis left great toe 2. Gangrene left great toe  Postoperative diagnosis:  Same as above  Procedure:  Amputation left great toe  Anesthesia:  Mac with local  Hemostasis:  Pneumatic ankle tourniquet at 250 mmHg  Estimated blood loss:  5 mL   Specimen:  1. Left great toe 2. Swab culture from left great toe  Complications: None  Condition upon discharge: Stable    Procedure in detail:  Patient was brought the operating room placed the operating table in a supine position.  Following adequate IV sedation well-padded pneumatic ankle tourniquet was placed around the patient's left ankle and set at 250 mmHg.  10 cc 0.5% Marcaine plain was injected about the surgical site.  Left foot was then prepped scrubbed and draped in normal aseptic manner.  Time-out was then called.  The pneumatic ankle tourniquet was inflated to 250 mmHg.  At this time attention was directed the patient's left foot where the left great toe was noted to be significantly edematous erythematous and gangrenous with multiple open wounds and active drainage.  At this time a swab culture was taken from the drainage passed from the operating field and sent for aerobic and anaerobic culture.  At this time utilizing a 10. Blade a racquet type incision was made about the base the toe at the level of healthy uninfected appearing tissue.  Dissection was carried sharply down to the metatarsophalangeal joint.  The great toe was then sharply disarticulated at the metatarsophalangeal joint and passed from the operating field.  The area was inspected and the soft tissue surrounding the metatarsal head appeared viable.  No abscess or other fluid collections were encountered.  The 1st metatarsal head appeared healthy and uninfected.  At this time the wound was flushed with copious amounts  of sterile saline.  Due to the degree of soft tissue necrosis around the metatarsophalangeal joint the incision was not able to be fully primarily closed.  Partial closure was performed at the most dorsal and plantar aspects of the city in with 3-0 Vicryl and 3-0 Prolene.  The central portion of the incision is still fully open.  The pneumatic tourniquet was deflated and bleeders were cauterized as necessary.  An additional 10 cc of 0.5% Marcaine plain was injected about the surgical site.  The patient's foot was dressed with Xeroform 4x4s ABD pad Kerlix and an Ace wrap.  His left foot was placed in a surgical shoe.  Patient tolerated the procedure well.  He had anesthesia reversed and left the operating Room stable vitals.    Patient will return to his room on the floor.  I placed a consult for Wound Care to place a wound VAC over the open wound.  Patient will require continued outpatient wound care and serial debridements due to the open wound.

## 2022-02-17 NOTE — INTERVAL H&P NOTE
The patient has been examined and the H&P has been reviewed:    I concur with the findings and no changes have occurred since H&P was written.    Anesthesia risks, benefits and alternative options discussed and understood by patient/family.          Active Hospital Problems    Diagnosis  POA    *Osteomyelitis of great toe of left foot [M86.9]  Yes    Diabetic ulcer of left foot [E11.621, L97.529]  Yes    Uncontrolled type 2 diabetes mellitus [E11.65]  Yes    Hypertension [I10]  Yes    NATHALIE (acute kidney injury) [N17.9]  Yes    Microcytic anemia [D50.9]  Yes    Obesity (BMI 30.0-34.9) [E66.9]  Yes      Resolved Hospital Problems   No resolved problems to display.

## 2022-02-17 NOTE — PROGRESS NOTES
02/17/22 1722 02/17/22 1728        Incision/Site 02/17/22 1033 Left Toe, first   Date First Assessed/Time First Assessed: 02/17/22 1033   Side: Left  Location: Toe, first   Wound Image      --    Dressing Appearance Moist drainage  --    Drainage Amount Large  --    Drainage Characteristics/Odor Malodorous  --    Appearance Red;Maroon;Bone;Sutures intact  (4 sutures plantar and 1 suture dorsum)  --    Wound Edges Open  --    Wound Length (cm) 4 cm  --    Wound Width (cm) 2.6 cm  --    Wound Depth (cm) 1.2 cm  --    Wound Volume (cm^3) 12.48 cm^3  --    Wound Surface Area (cm^2) 10.4 cm^2  --    Care Cleansed with:;Sterile normal saline;Wound cleanser  --    Dressing   (NPWT)  --         Negative Pressure Wound Therapy  02/17/22 1700 Left distal   Placement Date/Time: 02/17/22 1700   Side: Left  Orientation: distal  Location: Foot;Toe, first   NPWT Type  --  Vacuum Therapy   Pressure Setting NPWT  --  125 mmHg   Sponges Inserted NPWT  --  White;1  (2 green)     We would like to use NPWT to accelerate granulation tissue formation.  This cannot be achieved with topical dressing or medications.

## 2022-02-18 LAB
ALBUMIN SERPL BCP-MCNC: 3 G/DL (ref 3.5–5.2)
ALP SERPL-CCNC: 75 U/L (ref 55–135)
ALT SERPL W/O P-5'-P-CCNC: 11 U/L (ref 10–44)
ANION GAP SERPL CALC-SCNC: 11 MMOL/L (ref 8–16)
AST SERPL-CCNC: 11 U/L (ref 10–40)
BASOPHILS # BLD AUTO: 0.05 K/UL (ref 0–0.2)
BASOPHILS NFR BLD: 0.6 % (ref 0–1.9)
BILIRUB SERPL-MCNC: 0.8 MG/DL (ref 0.1–1)
BUN SERPL-MCNC: 16 MG/DL (ref 8–23)
CALCIUM SERPL-MCNC: 8.7 MG/DL (ref 8.7–10.5)
CHLORIDE SERPL-SCNC: 97 MMOL/L (ref 95–110)
CO2 SERPL-SCNC: 26 MMOL/L (ref 23–29)
CREAT SERPL-MCNC: 1.3 MG/DL (ref 0.5–1.4)
DIFFERENTIAL METHOD: ABNORMAL
EOSINOPHIL # BLD AUTO: 0.1 K/UL (ref 0–0.5)
EOSINOPHIL NFR BLD: 0.9 % (ref 0–8)
ERYTHROCYTE [DISTWIDTH] IN BLOOD BY AUTOMATED COUNT: 15.3 % (ref 11.5–14.5)
EST. GFR  (AFRICAN AMERICAN): >60 ML/MIN/1.73 M^2
EST. GFR  (NON AFRICAN AMERICAN): 55.7 ML/MIN/1.73 M^2
GLUCOSE SERPL-MCNC: 176 MG/DL (ref 70–110)
GLUCOSE SERPL-MCNC: 205 MG/DL (ref 70–110)
GLUCOSE SERPL-MCNC: 226 MG/DL (ref 70–110)
GLUCOSE SERPL-MCNC: 234 MG/DL (ref 70–110)
GLUCOSE SERPL-MCNC: 272 MG/DL (ref 70–110)
HCT VFR BLD AUTO: 31.5 % (ref 40–54)
HGB BLD-MCNC: 9.1 G/DL (ref 14–18)
IMM GRANULOCYTES # BLD AUTO: 0.03 K/UL (ref 0–0.04)
IMM GRANULOCYTES NFR BLD AUTO: 0.3 % (ref 0–0.5)
LYMPHOCYTES # BLD AUTO: 2 K/UL (ref 1–4.8)
LYMPHOCYTES NFR BLD: 22.2 % (ref 18–48)
MAGNESIUM SERPL-MCNC: 1.8 MG/DL (ref 1.6–2.6)
MCH RBC QN AUTO: 24.1 PG (ref 27–31)
MCHC RBC AUTO-ENTMCNC: 28.9 G/DL (ref 32–36)
MCV RBC AUTO: 84 FL (ref 82–98)
MONOCYTES # BLD AUTO: 0.7 K/UL (ref 0.3–1)
MONOCYTES NFR BLD: 8.4 % (ref 4–15)
NEUTROPHILS # BLD AUTO: 6 K/UL (ref 1.8–7.7)
NEUTROPHILS NFR BLD: 67.6 % (ref 38–73)
NRBC BLD-RTO: 0 /100 WBC
PLATELET # BLD AUTO: 407 K/UL (ref 150–450)
PMV BLD AUTO: 10.6 FL (ref 9.2–12.9)
POTASSIUM SERPL-SCNC: 4.5 MMOL/L (ref 3.5–5.1)
PROT SERPL-MCNC: 7.1 G/DL (ref 6–8.4)
RBC # BLD AUTO: 3.77 M/UL (ref 4.6–6.2)
SODIUM SERPL-SCNC: 134 MMOL/L (ref 136–145)
WBC # BLD AUTO: 8.83 K/UL (ref 3.9–12.7)

## 2022-02-18 PROCEDURE — 99232 PR SUBSEQUENT HOSPITAL CARE,LEVL II: ICD-10-PCS | Mod: ,,, | Performed by: PODIATRIST

## 2022-02-18 PROCEDURE — 80053 COMPREHEN METABOLIC PANEL: CPT | Performed by: NURSE PRACTITIONER

## 2022-02-18 PROCEDURE — 63600175 PHARM REV CODE 636 W HCPCS: Performed by: FAMILY MEDICINE

## 2022-02-18 PROCEDURE — 85025 COMPLETE CBC W/AUTO DIFF WBC: CPT | Performed by: NURSE PRACTITIONER

## 2022-02-18 PROCEDURE — 82962 GLUCOSE BLOOD TEST: CPT

## 2022-02-18 PROCEDURE — 25000003 PHARM REV CODE 250: Performed by: INTERNAL MEDICINE

## 2022-02-18 PROCEDURE — 25000003 PHARM REV CODE 250: Performed by: PODIATRIST

## 2022-02-18 PROCEDURE — 99232 SBSQ HOSP IP/OBS MODERATE 35: CPT | Mod: ,,, | Performed by: PODIATRIST

## 2022-02-18 PROCEDURE — 12000002 HC ACUTE/MED SURGE SEMI-PRIVATE ROOM

## 2022-02-18 PROCEDURE — 36415 COLL VENOUS BLD VENIPUNCTURE: CPT | Performed by: NURSE PRACTITIONER

## 2022-02-18 PROCEDURE — 25000003 PHARM REV CODE 250: Performed by: NURSE PRACTITIONER

## 2022-02-18 PROCEDURE — 63600175 PHARM REV CODE 636 W HCPCS: Performed by: NURSE PRACTITIONER

## 2022-02-18 PROCEDURE — 83735 ASSAY OF MAGNESIUM: CPT | Performed by: NURSE PRACTITIONER

## 2022-02-18 PROCEDURE — 94761 N-INVAS EAR/PLS OXIMETRY MLT: CPT

## 2022-02-18 RX ORDER — HYDROCODONE BITARTRATE AND ACETAMINOPHEN 5; 325 MG/1; MG/1
1 TABLET ORAL EVERY 4 HOURS PRN
Status: DISCONTINUED | OUTPATIENT
Start: 2022-02-18 | End: 2022-02-21 | Stop reason: HOSPADM

## 2022-02-18 RX ORDER — LEVOFLOXACIN 500 MG/1
500 TABLET, FILM COATED ORAL DAILY
Status: DISCONTINUED | OUTPATIENT
Start: 2022-02-18 | End: 2022-02-21 | Stop reason: HOSPADM

## 2022-02-18 RX ADMIN — SENNOSIDES AND DOCUSATE SODIUM 1 TABLET: 50; 8.6 TABLET ORAL at 09:02

## 2022-02-18 RX ADMIN — HYDROCODONE BITARTRATE AND ACETAMINOPHEN 1 TABLET: 5; 325 TABLET ORAL at 07:02

## 2022-02-18 RX ADMIN — CEFEPIME HYDROCHLORIDE 1 G: 1 INJECTION, SOLUTION INTRAVENOUS at 10:02

## 2022-02-18 RX ADMIN — ASPIRIN 81 MG 81 MG: 81 TABLET ORAL at 09:02

## 2022-02-18 RX ADMIN — HYDRALAZINE HYDROCHLORIDE 25 MG: 25 TABLET ORAL at 05:02

## 2022-02-18 RX ADMIN — HUMAN INSULIN 6 UNITS: 100 INJECTION, SOLUTION SUBCUTANEOUS at 05:02

## 2022-02-18 RX ADMIN — HYDROCODONE BITARTRATE AND ACETAMINOPHEN 1 TABLET: 10; 325 TABLET ORAL at 05:02

## 2022-02-18 RX ADMIN — CEFEPIME HYDROCHLORIDE 1 G: 1 INJECTION, SOLUTION INTRAVENOUS at 12:02

## 2022-02-18 RX ADMIN — ENOXAPARIN SODIUM 40 MG: 40 INJECTION SUBCUTANEOUS at 05:02

## 2022-02-18 RX ADMIN — HYDRALAZINE HYDROCHLORIDE 25 MG: 25 TABLET ORAL at 09:02

## 2022-02-18 RX ADMIN — HUMAN INSULIN 9 UNITS: 100 INJECTION, SOLUTION SUBCUTANEOUS at 12:02

## 2022-02-18 RX ADMIN — HUMAN INSULIN 6 UNITS: 100 INJECTION, SOLUTION SUBCUTANEOUS at 10:02

## 2022-02-18 RX ADMIN — PRAVASTATIN SODIUM 40 MG: 40 TABLET ORAL at 09:02

## 2022-02-18 RX ADMIN — LEVOFLOXACIN 500 MG: 500 TABLET, FILM COATED ORAL at 12:02

## 2022-02-18 RX ADMIN — HYDRALAZINE HYDROCHLORIDE 25 MG: 25 TABLET ORAL at 01:02

## 2022-02-18 RX ADMIN — COLLAGENASE SANTYL: 250 OINTMENT TOPICAL at 09:02

## 2022-02-18 NOTE — ANESTHESIA POSTPROCEDURE EVALUATION
Anesthesia Post Evaluation    Patient: Bandar Tomas    Procedure(s) Performed: Procedure(s) (LRB):  AMPUTATION, TOE (Left)    Final Anesthesia Type: MAC      Patient location during evaluation: PACU  Patient participation: Yes- Able to Participate  Level of consciousness: awake and alert  Post-procedure vital signs: reviewed and stable  Pain management: adequate  Airway patency: patent    PONV status at discharge: No PONV  Anesthetic complications: no      Cardiovascular status: hemodynamically stable  Respiratory status: unassisted, spontaneous ventilation and room air  Hydration status: euvolemic  Follow-up not needed.          Vitals Value Taken Time   /81 02/18/22 0735   Temp 37.2 °C (99 °F) 02/18/22 0735   Pulse 85 02/18/22 0735   Resp 19 02/18/22 0735   SpO2 97 % 02/18/22 0735         No case tracking events are documented in the log.      Pain/Nany Score: Pain Rating Prior to Med Admin: 10 (2/18/2022  5:17 AM)  Pain Rating Post Med Admin: 5 (2/18/2022  6:12 AM)

## 2022-02-18 NOTE — CONSULTS
[facility]  Vascular Surgery  Consult Note    Consults  Subjective:     Chief Complaint/Reason for Admission:  Left great toe infection    History of Present Illness:  Mr. Reyes is a 71-year-old male with a past medical history of hypertension, hyperlipidemia, insulin-dependent diabetes mellitus type 2, and obesity BMI 32 who presents today with complaints of a left toe infection.  It is severe.  It is associated with purulent drainage, malodor, and pain.  He denies fever, chills, nausea, vomiting, diarrhea, dizziness, chest pain, shortness of breath, loss of consciousness.  He has known injury to the toe.  He states he took off his today issues about 2 weeks ago and noticeable ulcerations status toe.  He went to the pharmacy and ask pharmacist for recommendations and was recommended triple antibiotic ointment and soaks for the toe which he did with no improvement.  He does not have a podiatrist.  He states his glucoses have been out of control over the last 2 weeks foot due to this infection.  He does not have an endocrinologist.  X-ray of foot in the ED revealed: Osteomyelitis of great toe proximal and distal phalanges. Pathologic fracture of great toe distal phalanx  Patient underwent amputation of the great toe today.  Arterial duplex showed patent flow to the foot through the posterior tibial artery.    Medications Prior to Admission   Medication Sig Dispense Refill Last Dose    aspirin 81 MG Chew Take 81 mg by mouth once daily.   2/14/2022 at 09:00    insulin  unit/mL injection Inject 25 Units into the skin 2 (two) times daily before meals.   2/14/2022 at Unknown time    insulin regular 100 unit/mL Inj injection Inject 25 Units into the skin 2 (two) times daily before meals.   2/14/2022 at Unknown time    losartan (COZAAR) 100 MG tablet Take 100 mg by mouth once daily.   2/14/2022 at 09:00    pravastatin (PRAVACHOL) 40 MG tablet Take 40 mg by mouth once daily.   2/14/2022 at 09:00     spironolactone (ALDACTONE) 50 MG tablet Take 50 mg by mouth once daily.   2/14/2022 at 09:00       Review of patient's allergies indicates:  No Known Allergies    Past Medical History:   Diagnosis Date    Diabetes     Former smoker     HTN (hypertension)      Past Surgical History:   Procedure Laterality Date    COLONOSCOPY  prior to 2007    COLONOSCOPY N/A 3/31/2016    Procedure: COLONOSCOPY;  Surgeon: Perry Rodriguez MD;  Location: [facility];  Service: Endoscopy;  Laterality: N/A;    SHOULDER SURGERY Left     UPPER GASTROINTESTINAL ENDOSCOPY       Family History     Problem Relation (Age of Onset)    Brain cancer Brother (54)    Esophageal cancer Paternal Grandfather (87)        Tobacco Use    Smoking status: Former Smoker    Smokeless tobacco: Not on file   Substance and Sexual Activity    Alcohol use: Yes     Alcohol/week: 0.0 standard drinks    Drug use: No    Sexual activity: Not on file     Review of Systems   Respiratory: Negative.    Gastrointestinal: Negative.    Musculoskeletal:        Left toe infection   Skin: Negative.    Neurological: Negative.      Objective:     Vital Signs (Most Recent):  Temp: 98.4 °F (36.9 °C) (02/17/22 1720)  Pulse: 97 (02/17/22 1720)  Resp: 17 (02/17/22 1749)  BP: (!) 144/77 (02/17/22 1720)  SpO2: 96 % (02/17/22 1720) Vital Signs (24h Range):  Temp:  [98 °F (36.7 °C)-98.6 °F (37 °C)] 98.4 °F (36.9 °C)  Pulse:  [77-97] 97  Resp:  [16-19] 17  SpO2:  [96 %-100 %] 96 %  BP: (133-151)/(73-88) 144/77     Weight: 114 kg (251 lb 5.2 oz)  Body mass index is 33.16 kg/m².    Date 02/17/22 0700 - 02/18/22 0659   Shift 6408-6963 6220-8699 5976-6430 24 Hour Total   INTAKE   IV Piggyback 500   500   Shift Total(mL/kg) 500(4.4)   500(4.4)   OUTPUT   Blood 50   50   Shift Total(mL/kg) 50(0.4)   50(0.4)   Weight (kg) 114 114 114 114       Physical Exam  HENT:      Head: Normocephalic.   Cardiovascular:      Rate and Rhythm: Normal rate.      Pulses:           Femoral pulses  are 2+ on the right side and 2+ on the left side.       Posterior tibial pulses are 2+ on the right side and 2+ on the left side.   Pulmonary:      Breath sounds: Normal breath sounds.   Abdominal:      Palpations: Abdomen is soft.   Musculoskeletal:         General: Normal range of motion.      Cervical back: Normal range of motion.   Feet:      Comments: Left foot wrapped postop from toe amputation  Skin:     General: Skin is warm and dry.   Neurological:      Mental Status: He is alert and oriented to person, place, and time.         Significant Labs:  CBC:   Recent Labs   Lab 02/17/22  0448   WBC 7.84   RBC 3.90*   HGB 9.5*   HCT 31.9*      MCV 82   MCH 24.4*   MCHC 29.8*     CMP:   Recent Labs   Lab 02/17/22 0448   *   CALCIUM 9.0   ALBUMIN 3.1*   PROT 7.2   *   K 4.2   CO2 25   CL 98   BUN 21   CREATININE 1.2   ALKPHOS 82   ALT 11   AST 11   BILITOT 0.7       Significant Diagnostics:  I have reviewed all pertinent imaging results/findings within the past 24 hours.    Assessment/Plan:   Patient with right toe diabetic infection status post amputation.  Excellent posterior tibial palpable pulse.  Should be adequate flow for healing and would not recommend any intervention at this time from a vascular standpoint.  Active Diagnoses:    Diagnosis Date Noted POA    PRINCIPAL PROBLEM:  Osteomyelitis of great toe of left foot [M86.9] 02/14/2022 Yes    Diabetic ulcer of left foot [E11.621, L97.529] 02/14/2022 Yes    Uncontrolled type 2 diabetes mellitus [E11.65] 02/14/2022 Yes    Hypertension [I10] 02/14/2022 Yes    NATHALIE (acute kidney injury) [N17.9] 02/14/2022 Yes    Microcytic anemia [D50.9] 02/14/2022 Yes    Obesity (BMI 30.0-34.9) [E66.9] 02/14/2022 Yes      Problems Resolved During this Admission:       Thank you for your consult. I will sign off. Please contact us if you have any additional questions.    Juni Becker MD  Vascular Surgery  [facility]

## 2022-02-18 NOTE — PROGRESS NOTES
[facility]  Podiatry  Progress Note    Patient Name: Bandar Tomas  MRN: [id number]  Admission Date: 2/14/2022  Hospital Length of Stay: 4 days  Attending Physician: Romulo Galeas MD  Primary Care Provider: Veena Stroud MD     Subjective:     Interval History:  Patient seen at bedside.  States mild to control pain to his left foot.  Wound VAC was placed yesterday and is functioning well.    Follow-up For: Procedure(s) (LRB):  AMPUTATION, TOE (Left)    Post-Operative Day: 1 Day Post-Op    Scheduled Meds:   aspirin  81 mg Oral Daily    collagenase   Topical (Top) Daily    enoxaparin  40 mg Subcutaneous Daily    hydrALAZINE  25 mg Oral Q8H    insulin detemir U-100  60 Units Subcutaneous Daily    levoFLOXacin  500 mg Oral Daily    pravastatin  40 mg Oral QHS    senna-docusate 8.6-50 mg  1 tablet Oral BID     Continuous Infusions:  PRN Meds:acetaminophen, calcium chloride IVPB, calcium chloride IVPB, calcium chloride IVPB, cloNIDine, dextrose 10%, dextrose 10%, diphenhydrAMINE, glucagon (human recombinant), glucose, glucose, HYDROcodone-acetaminophen, HYDROcodone-acetaminophen, insulin regular, magnesium oxide, magnesium sulfate IVPB, magnesium sulfate IVPB, magnesium sulfate IVPB, magnesium sulfate IVPB, melatonin, morphine, naloxone, ondansetron, ondansetron, ondansetron, oxyCODONE, polyethylene glycol, potassium chloride in water, potassium chloride in water, potassium chloride in water, potassium chloride in water, potassium chloride, potassium chloride, potassium chloride, potassium chloride, promethazine, sodium chloride 0.9%, sodium chloride 0.9%    Review of Systems   Constitutional: Negative for chills, fatigue, fever and unexpected weight change.   HENT: Negative for hearing loss and trouble swallowing.    Eyes: Negative for photophobia and visual disturbance.   Respiratory: Negative for cough, shortness of breath and wheezing.    Cardiovascular: Negative for chest pain, palpitations and  leg swelling.   Gastrointestinal: Negative for abdominal pain and nausea.   Genitourinary: Negative for dysuria and frequency.   Musculoskeletal: Positive for joint swelling. Negative for arthralgias, back pain, gait problem and myalgias.   Skin: Positive for color change and wound. Negative for rash.   Neurological: Positive for numbness. Negative for tremors, seizures, weakness and headaches.   Hematological: Does not bruise/bleed easily.     Objective:     Vital Signs (Most Recent):  Temp: 99 °F (37.2 °C) (02/18/22 0735)  Pulse: 85 (02/18/22 0735)  Resp: 19 (02/18/22 0735)  BP: (!) 148/81 (02/18/22 0735)  SpO2: 97 % (02/18/22 0735) Vital Signs (24h Range):  Temp:  [98.4 °F (36.9 °C)-99.3 °F (37.4 °C)] 99 °F (37.2 °C)  Pulse:  [80-97] 85  Resp:  [17-20] 19  SpO2:  [95 %-98 %] 97 %  BP: (116-153)/(76-81) 148/81     Weight: 114 kg (251 lb 5.2 oz)  Body mass index is 33.16 kg/m².    Foot Exam    Laboratory:  A1C:   Recent Labs   Lab 02/15/22  0649   HGBA1C 16.4*     CBC:   Recent Labs   Lab 02/18/22 0445   WBC 8.83   RBC 3.77*   HGB 9.1*   HCT 31.5*      MCV 84   MCH 24.1*   MCHC 28.9*     CMP:   Recent Labs   Lab 02/18/22  0445   *   CALCIUM 8.7   ALBUMIN 3.0*   PROT 7.1   *   K 4.5   CO2 26   CL 97   BUN 16   CREATININE 1.3   ALKPHOS 75   ALT 11   AST 11   BILITOT 0.8     CRP:   Recent Labs   Lab 02/14/22 2123   CRP 3.97*     ESR: No results for input(s): SEDRATE in the last 168 hours.  Wound Cultures:   Recent Labs   Lab 02/14/22 2042   LABAERO PROTEUS VULGARIS  Moderate  *       Diagnostic Results:  I have reviewed all pertinent imaging results/findings within the past 24 hours.    Clinical Findings:  Wound VAC is intact and functioning well to the left foot.  Left undisturbed.      Assessment/Plan:     Active Diagnoses:    Diagnosis Date Noted POA    PRINCIPAL PROBLEM:  Osteomyelitis of great toe of left foot [M86.9] 02/14/2022 Yes    Diabetic ulcer of left foot [E11.621, L97.529]  02/14/2022 Yes    Uncontrolled type 2 diabetes mellitus [E11.65] 02/14/2022 Yes    Hypertension [I10] 02/14/2022 Yes    NATHALIE (acute kidney injury) [N17.9] 02/14/2022 Yes    Microcytic anemia [D50.9] 02/14/2022 Yes    Obesity (BMI 30.0-34.9) [E66.9] 02/14/2022 Yes      Problems Resolved During this Admission:       Patient is okay for discharge from a podiatry standpoint.  Will continue with home health and wound VAC therapy.  Also recommend referral to the outpatient wound care center.    Jesse Rodríguez DPM  Podiatry  [facility]

## 2022-02-19 PROBLEM — D50.9 MICROCYTIC ANEMIA: Status: RESOLVED | Noted: 2022-02-14 | Resolved: 2022-02-19

## 2022-02-19 PROBLEM — N17.9 AKI (ACUTE KIDNEY INJURY): Status: RESOLVED | Noted: 2022-02-14 | Resolved: 2022-02-19

## 2022-02-19 PROBLEM — M86.9 OSTEOMYELITIS OF GREAT TOE OF LEFT FOOT: Status: RESOLVED | Noted: 2022-02-14 | Resolved: 2022-02-19

## 2022-02-19 LAB
ALBUMIN SERPL BCP-MCNC: 3 G/DL (ref 3.5–5.2)
ALP SERPL-CCNC: 76 U/L (ref 55–135)
ALT SERPL W/O P-5'-P-CCNC: 13 U/L (ref 10–44)
ANION GAP SERPL CALC-SCNC: 8 MMOL/L (ref 8–16)
AST SERPL-CCNC: 19 U/L (ref 10–40)
BASOPHILS # BLD AUTO: 0.05 K/UL (ref 0–0.2)
BASOPHILS NFR BLD: 0.7 % (ref 0–1.9)
BILIRUB SERPL-MCNC: 0.4 MG/DL (ref 0.1–1)
BUN SERPL-MCNC: 15 MG/DL (ref 8–23)
CALCIUM SERPL-MCNC: 9 MG/DL (ref 8.7–10.5)
CHLORIDE SERPL-SCNC: 98 MMOL/L (ref 95–110)
CO2 SERPL-SCNC: 29 MMOL/L (ref 23–29)
CREAT SERPL-MCNC: 1.2 MG/DL (ref 0.5–1.4)
DIFFERENTIAL METHOD: ABNORMAL
EOSINOPHIL # BLD AUTO: 0.1 K/UL (ref 0–0.5)
EOSINOPHIL NFR BLD: 1.2 % (ref 0–8)
ERYTHROCYTE [DISTWIDTH] IN BLOOD BY AUTOMATED COUNT: 15.2 % (ref 11.5–14.5)
EST. GFR  (AFRICAN AMERICAN): >60 ML/MIN/1.73 M^2
EST. GFR  (NON AFRICAN AMERICAN): >60 ML/MIN/1.73 M^2
GLUCOSE SERPL-MCNC: 109 MG/DL (ref 70–110)
GLUCOSE SERPL-MCNC: 244 MG/DL (ref 70–110)
GLUCOSE SERPL-MCNC: 281 MG/DL (ref 70–110)
GLUCOSE SERPL-MCNC: 287 MG/DL (ref 70–110)
GLUCOSE SERPL-MCNC: 81 MG/DL (ref 70–110)
HCT VFR BLD AUTO: 32.1 % (ref 40–54)
HGB BLD-MCNC: 9.3 G/DL (ref 14–18)
IMM GRANULOCYTES # BLD AUTO: 0.01 K/UL (ref 0–0.04)
IMM GRANULOCYTES NFR BLD AUTO: 0.1 % (ref 0–0.5)
LYMPHOCYTES # BLD AUTO: 1.9 K/UL (ref 1–4.8)
LYMPHOCYTES NFR BLD: 24.4 % (ref 18–48)
MCH RBC QN AUTO: 24 PG (ref 27–31)
MCHC RBC AUTO-ENTMCNC: 29 G/DL (ref 32–36)
MCV RBC AUTO: 83 FL (ref 82–98)
MONOCYTES # BLD AUTO: 0.7 K/UL (ref 0.3–1)
MONOCYTES NFR BLD: 9.2 % (ref 4–15)
NEUTROPHILS # BLD AUTO: 4.9 K/UL (ref 1.8–7.7)
NEUTROPHILS NFR BLD: 64.4 % (ref 38–73)
NRBC BLD-RTO: 0 /100 WBC
PLATELET # BLD AUTO: 423 K/UL (ref 150–450)
PMV BLD AUTO: 10.3 FL (ref 9.2–12.9)
POTASSIUM SERPL-SCNC: 4.1 MMOL/L (ref 3.5–5.1)
PROT SERPL-MCNC: 7.2 G/DL (ref 6–8.4)
RBC # BLD AUTO: 3.87 M/UL (ref 4.6–6.2)
SODIUM SERPL-SCNC: 135 MMOL/L (ref 136–145)
WBC # BLD AUTO: 7.58 K/UL (ref 3.9–12.7)

## 2022-02-19 PROCEDURE — 94799 UNLISTED PULMONARY SVC/PX: CPT

## 2022-02-19 PROCEDURE — 25000003 PHARM REV CODE 250: Performed by: NURSE PRACTITIONER

## 2022-02-19 PROCEDURE — 63600175 PHARM REV CODE 636 W HCPCS: Performed by: NURSE PRACTITIONER

## 2022-02-19 PROCEDURE — 99900035 HC TECH TIME PER 15 MIN (STAT)

## 2022-02-19 PROCEDURE — 80053 COMPREHEN METABOLIC PANEL: CPT | Performed by: NURSE PRACTITIONER

## 2022-02-19 PROCEDURE — 85025 COMPLETE CBC W/AUTO DIFF WBC: CPT | Performed by: NURSE PRACTITIONER

## 2022-02-19 PROCEDURE — 12000002 HC ACUTE/MED SURGE SEMI-PRIVATE ROOM

## 2022-02-19 PROCEDURE — 63600175 PHARM REV CODE 636 W HCPCS: Performed by: FAMILY MEDICINE

## 2022-02-19 PROCEDURE — 36415 COLL VENOUS BLD VENIPUNCTURE: CPT | Performed by: NURSE PRACTITIONER

## 2022-02-19 PROCEDURE — 94761 N-INVAS EAR/PLS OXIMETRY MLT: CPT

## 2022-02-19 PROCEDURE — 25000003 PHARM REV CODE 250: Performed by: INTERNAL MEDICINE

## 2022-02-19 PROCEDURE — 82962 GLUCOSE BLOOD TEST: CPT

## 2022-02-19 RX ORDER — HYDROCODONE BITARTRATE AND ACETAMINOPHEN 5; 325 MG/1; MG/1
1 TABLET ORAL EVERY 12 HOURS PRN
Qty: 15 TABLET | Refills: 0 | Status: SHIPPED | OUTPATIENT
Start: 2022-02-19 | End: 2022-03-02

## 2022-02-19 RX ORDER — CIPROFLOXACIN 500 MG/1
500 TABLET ORAL 2 TIMES DAILY
Qty: 60 TABLET | Refills: 0 | Status: SHIPPED | OUTPATIENT
Start: 2022-02-19 | End: 2022-03-21

## 2022-02-19 RX ADMIN — HYDROCODONE BITARTRATE AND ACETAMINOPHEN 1 TABLET: 5; 325 TABLET ORAL at 04:02

## 2022-02-19 RX ADMIN — ENOXAPARIN SODIUM 40 MG: 40 INJECTION SUBCUTANEOUS at 04:02

## 2022-02-19 RX ADMIN — HYDROCODONE BITARTRATE AND ACETAMINOPHEN 1 TABLET: 5; 325 TABLET ORAL at 09:02

## 2022-02-19 RX ADMIN — LEVOFLOXACIN 500 MG: 500 TABLET, FILM COATED ORAL at 06:02

## 2022-02-19 RX ADMIN — SENNOSIDES AND DOCUSATE SODIUM 1 TABLET: 50; 8.6 TABLET ORAL at 10:02

## 2022-02-19 RX ADMIN — SENNOSIDES AND DOCUSATE SODIUM 1 TABLET: 50; 8.6 TABLET ORAL at 09:02

## 2022-02-19 RX ADMIN — HYDRALAZINE HYDROCHLORIDE 25 MG: 25 TABLET ORAL at 02:02

## 2022-02-19 RX ADMIN — HUMAN INSULIN 9 UNITS: 100 INJECTION, SOLUTION SUBCUTANEOUS at 05:02

## 2022-02-19 RX ADMIN — HYDROCODONE BITARTRATE AND ACETAMINOPHEN 1 TABLET: 5; 325 TABLET ORAL at 10:02

## 2022-02-19 RX ADMIN — ASPIRIN 81 MG 81 MG: 81 TABLET ORAL at 09:02

## 2022-02-19 RX ADMIN — PRAVASTATIN SODIUM 40 MG: 40 TABLET ORAL at 10:02

## 2022-02-19 RX ADMIN — HYDRALAZINE HYDROCHLORIDE 25 MG: 25 TABLET ORAL at 10:02

## 2022-02-19 RX ADMIN — HUMAN INSULIN 9 UNITS: 100 INJECTION, SOLUTION SUBCUTANEOUS at 10:02

## 2022-02-19 RX ADMIN — HYDRALAZINE HYDROCHLORIDE 25 MG: 25 TABLET ORAL at 06:02

## 2022-02-19 RX ADMIN — COLLAGENASE SANTYL: 250 OINTMENT TOPICAL at 09:02

## 2022-02-19 NOTE — PROGRESS NOTES
[facility] Medicine  Progress Note    Patient name: Bandar Tomas  MRN: [id number]  Admit Date: 2/14/2022   LOS: 4 days     SUBJECTIVE:     Principal problem: Osteomyelitis of great toe of left foot    Interval History:  Afebrile the past 24 hours on IV antibiotics.  No leukocytosis.  NATHALIE improving on IV fluids.  Blood cultures remain no growth to date.  Plan for OR today with podiatry. Feels well otherwise    Hospital course  Patient was admitted for nonseptic osteomyelitis of the right great toe and NATHALIE. Patient was empirically started on IV antibiotics, IV fluids.  Wound culture grew Proteus.  Blood cultures remain no growth during hospitalization.      02/18  Overall feels better  Dont want to go home today and prefer going home Tomorrow AM     Scheduled Meds:   aspirin  81 mg Oral Daily    collagenase   Topical (Top) Daily    enoxaparin  40 mg Subcutaneous Daily    hydrALAZINE  25 mg Oral Q8H    insulin detemir U-100  60 Units Subcutaneous Daily    levoFLOXacin  500 mg Oral Daily    pravastatin  40 mg Oral QHS    senna-docusate 8.6-50 mg  1 tablet Oral BID     Continuous Infusions:    PRN Meds:acetaminophen, calcium chloride IVPB, calcium chloride IVPB, calcium chloride IVPB, cloNIDine, dextrose 50%, dextrose 50%, glucagon (human recombinant), glucose, glucose, HYDROcodone-acetaminophen, insulin regular, magnesium oxide, magnesium sulfate IVPB, magnesium sulfate IVPB, magnesium sulfate IVPB, magnesium sulfate IVPB, naloxone, ondansetron, ondansetron, ondansetron, polyethylene glycol, potassium chloride in water, potassium chloride in water, potassium chloride in water, potassium chloride in water, potassium chloride, potassium chloride, potassium chloride, potassium chloride, sodium chloride 0.9%, sodium chloride 0.9%    Review of patient's allergies indicates:  No Known Allergies    Review of Systems  Review of Systems   Constitutional: Negative for fever.   HENT: Negative for  hearing loss.    Eyes: Negative for blurred vision.   Respiratory: Negative for cough.    Cardiovascular: Negative for chest pain.   Gastrointestinal: Negative for heartburn.   Genitourinary: Negative for dysuria.   Musculoskeletal: Positive for myalgias.   Skin: Negative for rash.   Neurological: Negative for dizziness.   Endo/Heme/Allergies: Does not bruise/bleed easily.   Psychiatric/Behavioral: Negative for depression.       OBJECTIVE:     Vital Signs (Most Recent)  Temp: 97.7 °F (36.5 °C) (02/18/22 1608)  Pulse: 82 (02/18/22 1608)  Resp: 18 (02/18/22 1608)  BP: (!) 155/79 (02/18/22 1608)  SpO2: 97 % (02/18/22 1608)    Vital Signs Range (Last 24H):  Temp:  [97.7 °F (36.5 °C)-99.3 °F (37.4 °C)]   Pulse:  [80-92]   Resp:  [17-20]   BP: (116-155)/(76-81)   SpO2:  [95 %-98 %]     I & O (Last 24H):    Intake/Output Summary (Last 24 hours) at 2/18/2022 1858  Last data filed at 2/18/2022 1251  Gross per 24 hour   Intake 3235 ml   Output 1950 ml   Net 1285 ml       Physical Exam:  General: Patient resting comfortably in no acute distress. Appears as stated age. Calm. Obese  Eyes: EOM intact. No conjunctivae injection. No scleral icterus.  ENT: Hearing grossly intact. No discharge from ears. No nasal discharge.   CVS: RRR. No LE edema BL.  Lungs: CTA BL, no wheezing or crackles. Good breath sounds. No accessory muscle use. No acute respiratory distress  Neuro: Alert. Cranial nerves grossly intact. Moves all extremities equally. Follows commands. Responds appropriately   Psych: Mood, behavior, thought content and judgement normal  Skin: Foul smelling left great toe, dry diabetic ulcer on bottom right foot            Laboratory:  All pertinent labs within the past 24 hours have been reviewed.  CBC:   Recent Labs   Lab 02/17/22 0448 02/18/22 0445   WBC 7.84 8.83   HGB 9.5* 9.1*   HCT 31.9* 31.5*    407     CMP:   Recent Labs   Lab 02/17/22 0448 02/18/22  0445   * 134*   K 4.2 4.5   CL 98 97   CO2 25 26   GLU  143* 176*   BUN 21 16   CREATININE 1.2 1.3   CALCIUM 9.0 8.7   PROT 7.2 7.1   ALBUMIN 3.1* 3.0*   BILITOT 0.7 0.8   ALKPHOS 82 75   AST 11 11   ALT 11 11   ANIONGAP 12 11   EGFRNONAA >60.0 55.7*       Microbiology Results (last 7 days)     Procedure Component Value Units Date/Time    Culture, Anaerobic [[id number]] Collected: 02/17/22 1132    Order Status: Completed Specimen: Abscess from Toe, Left Foot Updated: 02/18/22 1147     Anaerobic Culture Culture in progress    Narrative:      Left Great Toe Abscess    Blood culture #2 **CANNOT BE ORDERED STAT** [[id number]] Collected: 02/14/22 2139    Order Status: Completed Specimen: Blood from Peripheral, Upper Arm, Right Updated: 02/18/22 0232     Blood Culture, Routine No Growth to date      No Growth to date      No Growth to date      No Growth to date    Blood culture #1 **CANNOT BE ORDERED STAT** [[id number]] Collected: 02/14/22 2124    Order Status: Completed Specimen: Blood from Peripheral, Upper Arm, Right Updated: 02/18/22 0232     Blood Culture, Routine No Growth to date      No Growth to date      No Growth to date      No Growth to date    Aerobic culture [[id number]] Collected: 02/17/22 1132    Order Status: Sent Specimen: Abscess from Toe, Left Foot Updated: 02/17/22 1132    Aerobic culture [[id number]]  (Abnormal)  (Susceptibility) Collected: 02/14/22 2042    Order Status: Completed Specimen: Wound from Toe, Left Foot Updated: 02/16/22 0651     Aerobic Bacterial Culture PROTEUS VULGARIS  Moderate             Diagnostic Results:        ASSESSMENT/PLAN:     Active Hospital Problems    Diagnosis  POA    *Osteomyelitis of great toe of left foot [M86.9]  Yes    Diabetic ulcer of left foot [E11.621, L97.529]  Yes    Uncontrolled type 2 diabetes mellitus [E11.65]  Yes    Hypertension [I10]  Yes    NATHALIE (acute kidney injury) [N17.9]  Yes    Microcytic anemia [D50.9]  Yes    Obesity (BMI 30.0-34.9) [E66.9]  Yes      Resolved Hospital Problems   No resolved  problems to display.           Plan:   Continue PO abx  Pain management  Wound care  Home Tomorrow AM with HH   Maintain Wound Vac      VTE Risk Mitigation (From admission, onward)         Ordered     enoxaparin injection 40 mg  Daily         02/14/22 2313     IP VTE HIGH RISK PATIENT  Once         02/14/22 2313     Place sequential compression device  Until discontinued         02/14/22 2313                        Patient care time was spent personally by me on the following activities: > 35 min  · Obtaining a history.  · Examination of patient.  · Providing medical care at the patients bedside.  · Developing a treatment plan with patient or surrogate and bedside caregivers.  · Ordering and reviewing laboratory studies, radiographic studies, pulse oximetry.  · Ordering and performing treatments and interventions.  · Evaluation of patient's response to treatment.  · Discussions with consultants while on the unit and immediately available to the patient.  · Re-evaluation of the patient's condition.  · Documentation in the medical record.       Department Hospital Medicine  [facility]  Romulo Galeas MD    Please note: This note was transcribed using voice recognition software. Because of this technology, there are often uinintended grammatical, spelling, and other transcription errors. Please disregard these errors.

## 2022-02-19 NOTE — NURSING
Discharge cancelled until wound vac care confirmed or wound vac is in place per home health. Possible tomorrow or Monday.

## 2022-02-19 NOTE — PLAN OF CARE
Referral and records sent to [facility] for wound vac. Home Health records sent to [facility]. Plan discharge on Sunday. [facility]. Spoke with Silvina [phone] who confirm visit on Monday.

## 2022-02-19 NOTE — PLAN OF CARE
Problem: Adult Inpatient Plan of Care  Goal: Plan of Care Review  Outcome: Ongoing, Progressing  Goal: Optimal Comfort and Wellbeing  Outcome: Ongoing, Progressing     Problem: Fluid and Electrolyte Imbalance (Acute Kidney Injury/Impairment)  Goal: Fluid and Electrolyte Balance  Outcome: Ongoing, Progressing     Problem: Diabetes Comorbidity  Goal: Blood Glucose Level Within Targeted Range  Outcome: Ongoing, Progressing     Problem: Impaired Wound Healing  Goal: Optimal Wound Healing  Outcome: Ongoing, Progressing     Problem: Skin Injury Risk Increased  Goal: Skin Health and Integrity  Outcome: Ongoing, Progressing     Problem: Oral Intake Inadequate  Goal: Improved Oral Intake  Outcome: Ongoing, Progressing     Problem: Fall Injury Risk  Goal: Absence of Fall and Fall-Related Injury  Outcome: Ongoing, Progressing

## 2022-02-20 LAB
BACTERIA BLD CULT: NORMAL
BACTERIA BLD CULT: NORMAL
BACTERIA SPEC ANAEROBE CULT: NORMAL
GLUCOSE SERPL-MCNC: 103 MG/DL (ref 70–110)
GLUCOSE SERPL-MCNC: 221 MG/DL (ref 70–110)
GLUCOSE SERPL-MCNC: 284 MG/DL (ref 70–110)
GLUCOSE SERPL-MCNC: 313 MG/DL (ref 70–110)

## 2022-02-20 PROCEDURE — 82962 GLUCOSE BLOOD TEST: CPT

## 2022-02-20 PROCEDURE — 25000003 PHARM REV CODE 250: Performed by: INTERNAL MEDICINE

## 2022-02-20 PROCEDURE — 94799 UNLISTED PULMONARY SVC/PX: CPT

## 2022-02-20 PROCEDURE — 63600175 PHARM REV CODE 636 W HCPCS: Performed by: FAMILY MEDICINE

## 2022-02-20 PROCEDURE — 63600175 PHARM REV CODE 636 W HCPCS: Performed by: NURSE PRACTITIONER

## 2022-02-20 PROCEDURE — 12000002 HC ACUTE/MED SURGE SEMI-PRIVATE ROOM

## 2022-02-20 PROCEDURE — 25000003 PHARM REV CODE 250: Performed by: NURSE PRACTITIONER

## 2022-02-20 PROCEDURE — 99900035 HC TECH TIME PER 15 MIN (STAT)

## 2022-02-20 PROCEDURE — 94761 N-INVAS EAR/PLS OXIMETRY MLT: CPT

## 2022-02-20 RX ADMIN — LEVOFLOXACIN 500 MG: 500 TABLET, FILM COATED ORAL at 06:02

## 2022-02-20 RX ADMIN — HYDROCODONE BITARTRATE AND ACETAMINOPHEN 1 TABLET: 5; 325 TABLET ORAL at 09:02

## 2022-02-20 RX ADMIN — HUMAN INSULIN 12 UNITS: 100 INJECTION, SOLUTION SUBCUTANEOUS at 09:02

## 2022-02-20 RX ADMIN — ASPIRIN 81 MG 81 MG: 81 TABLET ORAL at 09:02

## 2022-02-20 RX ADMIN — HYDRALAZINE HYDROCHLORIDE 25 MG: 25 TABLET ORAL at 09:02

## 2022-02-20 RX ADMIN — HUMAN INSULIN 6 UNITS: 100 INJECTION, SOLUTION SUBCUTANEOUS at 11:02

## 2022-02-20 RX ADMIN — HYDRALAZINE HYDROCHLORIDE 25 MG: 25 TABLET ORAL at 06:02

## 2022-02-20 RX ADMIN — HUMAN INSULIN 9 UNITS: 100 INJECTION, SOLUTION SUBCUTANEOUS at 04:02

## 2022-02-20 RX ADMIN — SENNOSIDES AND DOCUSATE SODIUM 1 TABLET: 50; 8.6 TABLET ORAL at 09:02

## 2022-02-20 RX ADMIN — PRAVASTATIN SODIUM 40 MG: 40 TABLET ORAL at 09:02

## 2022-02-20 RX ADMIN — ENOXAPARIN SODIUM 40 MG: 40 INJECTION SUBCUTANEOUS at 04:02

## 2022-02-20 RX ADMIN — HYDRALAZINE HYDROCHLORIDE 25 MG: 25 TABLET ORAL at 01:02

## 2022-02-20 NOTE — CARE UPDATE
02/19/22 2113   Patient Assessment/Suction   Level of Consciousness (AVPU) alert   Respiratory Effort Normal;Unlabored   Expansion/Accessory Muscles/Retractions no use of accessory muscles   All Lung Fields Breath Sounds clear   PRE-TX-O2   O2 Device (Oxygen Therapy) room air   SpO2 96 %   Pulse Oximetry Type Intermittent   $ Pulse Oximetry - Multiple Charge Pulse Oximetry - Multiple   Pulse 90   Resp 12   Incentive Spirometer   $ Incentive Spirometer Charges postop instruction   Incentive Spirometer Predicted Level (mL) 3000   Administration (IS) mouthpiece utilized   Number of Repetitions (IS) 10   Level Incentive Spirometer (mL) 1500   Patient Tolerance (IS) good   Encourage incentive spirometer

## 2022-02-20 NOTE — PROGRESS NOTES
[facility] Medicine  Progress Note    Patient name: Bandar Tomas  MRN: [id number]  Admit Date: 2/14/2022   LOS: 5 days     SUBJECTIVE:     Principal problem: Osteomyelitis of great toe of left foot    Interval History:  Afebrile the past 24 hours on IV antibiotics.  No leukocytosis.  NATHALIE improving on IV fluids.  Blood cultures remain no growth to date.  Plan for OR today with podiatry. Feels well otherwise    Hospital course  Patient was admitted for nonseptic osteomyelitis of the right great toe and NATHALIE. Patient was empirically started on IV antibiotics, IV fluids.  Wound culture grew Proteus.  Blood cultures remain no growth during hospitalization.      02/18  Overall feels better  Dont want to go home today and prefer going home Tomorrow AM     02/19  Discharge cancelled because pt need wound vac and should be ready when he goes home  Otherwise stable  No c/o     Scheduled Meds:   aspirin  81 mg Oral Daily    collagenase   Topical (Top) Daily    enoxaparin  40 mg Subcutaneous Daily    hydrALAZINE  25 mg Oral Q8H    insulin detemir U-100  60 Units Subcutaneous Daily    levoFLOXacin  500 mg Oral Daily    pravastatin  40 mg Oral QHS    senna-docusate 8.6-50 mg  1 tablet Oral BID     Continuous Infusions:    PRN Meds:acetaminophen, calcium chloride IVPB, calcium chloride IVPB, calcium chloride IVPB, cloNIDine, dextrose 50%, dextrose 50%, glucagon (human recombinant), glucose, glucose, HYDROcodone-acetaminophen, insulin regular, magnesium oxide, magnesium sulfate IVPB, magnesium sulfate IVPB, magnesium sulfate IVPB, magnesium sulfate IVPB, naloxone, ondansetron, ondansetron, ondansetron, polyethylene glycol, potassium chloride in water, potassium chloride in water, potassium chloride in water, potassium chloride in water, potassium chloride, potassium chloride, potassium chloride, potassium chloride, sodium chloride 0.9%, sodium chloride 0.9%    Review of patient's allergies  indicates:  No Known Allergies    Review of Systems  Review of Systems   Constitutional: Negative for fever.   HENT: Negative for hearing loss.    Eyes: Negative for blurred vision.   Respiratory: Negative for cough.    Cardiovascular: Negative for chest pain.   Gastrointestinal: Negative for heartburn.   Genitourinary: Negative for dysuria.   Musculoskeletal: Positive for myalgias.   Skin: Negative for rash.   Neurological: Negative for dizziness.   Endo/Heme/Allergies: Does not bruise/bleed easily.   Psychiatric/Behavioral: Negative for depression.       OBJECTIVE:     Vital Signs (Most Recent)  Temp: 98.3 °F (36.8 °C) (02/19/22 1727)  Pulse: 78 (02/19/22 1727)  Resp: 20 (02/19/22 1727)  BP: (!) 146/78 (02/19/22 1727)  SpO2: 98 % (02/19/22 1727)    Vital Signs Range (Last 24H):  Temp:  [98.2 °F (36.8 °C)-99.1 °F (37.3 °C)]   Pulse:  [78-88]   Resp:  [18-20]   BP: (110-156)/(75-86)   SpO2:  [95 %-98 %]     I & O (Last 24H):    Intake/Output Summary (Last 24 hours) at 2/19/2022 1915  Last data filed at 2/19/2022 0600  Gross per 24 hour   Intake --   Output 1450 ml   Net -1450 ml       Physical Exam:  General: Patient resting comfortably in no acute distress. Appears as stated age. Calm. Obese  Eyes: EOM intact. No conjunctivae injection. No scleral icterus.  ENT: Hearing grossly intact. No discharge from ears. No nasal discharge.   CVS: RRR. No LE edema BL.  Lungs: CTA BL, no wheezing or crackles. Good breath sounds. No accessory muscle use. No acute respiratory distress  Neuro: Alert. Cranial nerves grossly intact. Moves all extremities equally. Follows commands. Responds appropriately   Psych: Mood, behavior, thought content and judgement normal  Skin: bandage intact on LLE area            Laboratory:  All pertinent labs within the past 24 hours have been reviewed.  CBC:   Recent Labs   Lab 02/18/22  0445 02/19/22  0508   WBC 8.83 7.58   HGB 9.1* 9.3*   HCT 31.5* 32.1*    423     CMP:   Recent Labs   Lab  02/18/22  0445 02/19/22  0508   * 135*   K 4.5 4.1   CL 97 98   CO2 26 29   * 81   BUN 16 15   CREATININE 1.3 1.2   CALCIUM 8.7 9.0   PROT 7.1 7.2   ALBUMIN 3.0* 3.0*   BILITOT 0.8 0.4   ALKPHOS 75 76   AST 11 19   ALT 11 13   ANIONGAP 11 8   EGFRNONAA 55.7* >60.0       Microbiology Results (last 7 days)     Procedure Component Value Units Date/Time    Aerobic culture [[id number]] Collected: 02/17/22 1132    Order Status: Sent Specimen: Abscess from Toe, Left Foot Updated: 02/19/22 1329    Culture, Anaerobic [[id number]] Collected: 02/17/22 1132    Order Status: Completed Specimen: Abscess from Toe, Left Foot Updated: 02/19/22 1238     Anaerobic Culture Culture in progress    Narrative:      Left Great Toe Abscess    Blood culture #1 **CANNOT BE ORDERED STAT** [[id number]] Collected: 02/14/22 2124    Order Status: Completed Specimen: Blood from Peripheral, Upper Arm, Right Updated: 02/19/22 0232     Blood Culture, Routine No Growth to date      No Growth to date      No Growth to date      No Growth to date      No Growth to date    Blood culture #2 **CANNOT BE ORDERED STAT** [[id number]] Collected: 02/14/22 2139    Order Status: Completed Specimen: Blood from Peripheral, Upper Arm, Right Updated: 02/19/22 0232     Blood Culture, Routine No Growth to date      No Growth to date      No Growth to date      No Growth to date      No Growth to date    Aerobic culture [[id number]]  (Abnormal)  (Susceptibility) Collected: 02/14/22 2042    Order Status: Completed Specimen: Wound from Toe, Left Foot Updated: 02/16/22 0651     Aerobic Bacterial Culture PROTEUS VULGARIS  Moderate             Diagnostic Results:        ASSESSMENT/PLAN:     Active Hospital Problems    Diagnosis  POA    Diabetic ulcer of left foot [E11.621, L97.529]  Yes    Uncontrolled type 2 diabetes mellitus [E11.65]  Yes    Hypertension [I10]  Yes    Obesity (BMI 30.0-34.9) [E66.9]  Yes      Resolved Hospital Problems    Diagnosis Date Resolved  POA    *Osteomyelitis of great toe of left foot [M86.9] 02/19/2022 Yes    NATHALIE (acute kidney injury) [N17.9] 02/19/2022 Yes    Microcytic anemia [D50.9] 02/19/2022 Yes           Plan:   Continue present management   [facility]  HH arranged  Once wound vac is ready pt can go home       VTE Risk Mitigation (From admission, onward)         Ordered     enoxaparin injection 40 mg  Daily         02/14/22 2313     IP VTE HIGH RISK PATIENT  Once         02/14/22 2313     Place sequential compression device  Until discontinued         02/14/22 2313                        Patient care time was spent personally by me on the following activities: > 35 min  · Obtaining a history.  · Examination of patient.  · Providing medical care at the patients bedside.  · Developing a treatment plan with patient or surrogate and bedside caregivers.  · Ordering and reviewing laboratory studies, radiographic studies, pulse oximetry.  · Ordering and performing treatments and interventions.  · Evaluation of patient's response to treatment.  · Discussions with consultants while on the unit and immediately available to the patient.  · Re-evaluation of the patient's condition.  · Documentation in the medical record.       Department Hospital Medicine  [facility]  Romulo Galeas MD    Please note: This note was transcribed using voice recognition software. Because of this technology, there are often uinintended grammatical, spelling, and other transcription errors. Please disregard these errors.

## 2022-02-20 NOTE — PLAN OF CARE
Problem: Adult Inpatient Plan of Care  Goal: Plan of Care Review  Outcome: Ongoing, Progressing  Goal: Patient-Specific Goal (Individualized)  Outcome: Ongoing, Progressing  Goal: Absence of Hospital-Acquired Illness or Injury  Outcome: Ongoing, Progressing  Goal: Optimal Comfort and Wellbeing  Outcome: Ongoing, Progressing  Goal: Readiness for Transition of Care  Outcome: Ongoing, Progressing     Problem: Fluid and Electrolyte Imbalance (Acute Kidney Injury/Impairment)  Goal: Fluid and Electrolyte Balance  Outcome: Ongoing, Progressing     Problem: Oral Intake Inadequate (Acute Kidney Injury/Impairment)  Goal: Optimal Nutrition Intake  Outcome: Ongoing, Progressing     Problem: Fall Injury Risk  Goal: Absence of Fall and Fall-Related Injury  Outcome: Ongoing, Progressing     Problem: Oral Intake Inadequate  Goal: Improved Oral Intake  Outcome: Ongoing, Progressing     Problem: Diabetes Comorbidity  Goal: Blood Glucose Level Within Targeted Range  Outcome: Ongoing, Progressing     Problem: Adult Inpatient Plan of Care  Goal: Plan of Care Review  Outcome: Ongoing, Progressing  Goal: Patient-Specific Goal (Individualized)  Outcome: Ongoing, Progressing  Goal: Absence of Hospital-Acquired Illness or Injury  Outcome: Ongoing, Progressing  Goal: Optimal Comfort and Wellbeing  Outcome: Ongoing, Progressing  Goal: Readiness for Transition of Care  Outcome: Ongoing, Progressing     Problem: Fluid and Electrolyte Imbalance (Acute Kidney Injury/Impairment)  Goal: Fluid and Electrolyte Balance  Outcome: Ongoing, Progressing

## 2022-02-21 VITALS
HEIGHT: 73 IN | HEART RATE: 75 BPM | RESPIRATION RATE: 18 BRPM | WEIGHT: 251.31 LBS | TEMPERATURE: 98 F | OXYGEN SATURATION: 97 % | DIASTOLIC BLOOD PRESSURE: 76 MMHG | BODY MASS INDEX: 33.31 KG/M2 | SYSTOLIC BLOOD PRESSURE: 122 MMHG

## 2022-02-21 LAB
BACTERIA SPEC AEROBE CULT: ABNORMAL
BACTERIA SPEC AEROBE CULT: ABNORMAL
GLUCOSE SERPL-MCNC: 137 MG/DL (ref 70–110)
GLUCOSE SERPL-MCNC: 250 MG/DL (ref 70–110)

## 2022-02-21 PROCEDURE — 25000003 PHARM REV CODE 250: Performed by: NURSE PRACTITIONER

## 2022-02-21 PROCEDURE — 99900035 HC TECH TIME PER 15 MIN (STAT)

## 2022-02-21 PROCEDURE — 63600175 PHARM REV CODE 636 W HCPCS: Performed by: FAMILY MEDICINE

## 2022-02-21 PROCEDURE — 27000221 HC OXYGEN, UP TO 24 HOURS

## 2022-02-21 PROCEDURE — 97605 NEG PRS WND THER DME<=50SQCM: CPT

## 2022-02-21 PROCEDURE — 25000003 PHARM REV CODE 250: Performed by: INTERNAL MEDICINE

## 2022-02-21 RX ADMIN — HUMAN INSULIN 6 UNITS: 100 INJECTION, SOLUTION SUBCUTANEOUS at 12:02

## 2022-02-21 RX ADMIN — LEVOFLOXACIN 500 MG: 500 TABLET, FILM COATED ORAL at 05:02

## 2022-02-21 RX ADMIN — HYDROCODONE BITARTRATE AND ACETAMINOPHEN 1 TABLET: 5; 325 TABLET ORAL at 10:02

## 2022-02-21 RX ADMIN — SENNOSIDES AND DOCUSATE SODIUM 1 TABLET: 50; 8.6 TABLET ORAL at 08:02

## 2022-02-21 RX ADMIN — ASPIRIN 81 MG 81 MG: 81 TABLET ORAL at 08:02

## 2022-02-21 RX ADMIN — HYDRALAZINE HYDROCHLORIDE 25 MG: 25 TABLET ORAL at 05:02

## 2022-02-21 NOTE — PLAN OF CARE
Important Message from Medicare was sign, explained and given to patient/caregiver on 02/21/2022 at 9:04am     addressed any questions or concerns.    Important Message from Medicare document will be scanned into patient's medical record

## 2022-02-21 NOTE — PLAN OF CARE
Problem: Adult Inpatient Plan of Care  Goal: Plan of Care Review  Outcome: Ongoing, Progressing  Goal: Absence of Hospital-Acquired Illness or Injury  Outcome: Ongoing, Progressing  Goal: Optimal Comfort and Wellbeing  Outcome: Ongoing, Progressing     Problem: Fluid and Electrolyte Imbalance (Acute Kidney Injury/Impairment)  Goal: Fluid and Electrolyte Balance  Outcome: Ongoing, Progressing     Problem: Diabetes Comorbidity  Goal: Blood Glucose Level Within Targeted Range  Outcome: Ongoing, Progressing     Problem: Impaired Wound Healing  Goal: Optimal Wound Healing  Outcome: Ongoing, Progressing     Problem: Skin Injury Risk Increased  Goal: Skin Health and Integrity  Outcome: Ongoing, Progressing     Problem: Oral Intake Inadequate  Goal: Improved Oral Intake  Outcome: Ongoing, Progressing     Problem: Fall Injury Risk  Goal: Absence of Fall and Fall-Related Injury  Outcome: Ongoing, Progressing

## 2022-02-21 NOTE — PLAN OF CARE
02/21/22 1606   Final Note   Assessment Type Final Discharge Note   Anticipated Discharge Disposition Home-Health   Post-Acute Status   Post-Acute Authorization Home Health;Other;HME   HME Status Set-up Complete/Auth obtained  (Wound Vac)   Home Health Status Set-up Complete/Auth obtained   Discharge Delays None known at this time   Per Jeny at [facility], pt will be seen tomorrow 02/22/22.  Orders faxed to [facility] after speaking with Yasmin Carrasco.  Wound vac provided from [employer].  Discharge orders reviewed.  No further case management needs noted.

## 2022-02-21 NOTE — PLAN OF CARE
WILBUR sent additional information to [facility] this date and spoke to Levi who report that wound vac has been approved.  Nursing Supervisor assisted WILBUR with contacting Wound Care Nurse Kalli, who advised that referral was sent on Friday evening.  She reports that Pt can go home with kit from her office or [employer] [facility] can set Pt up prior to leaving hospital in the morning.  Due to time of day, Pt will wait for wound care in the morning.    Per RN BREEZY's note, Home Health records sent to [facility]. Spoke with Silvina [phone] who confirmed visit on Monday.  Home Health will also need to be updated in the morning.    WILBUR advised Dr. Galeas of the above.     02/20/22 1955   Discharge Reassessment   Assessment Type Discharge Planning Reassessment   Did the patient's condition or plan change since previous assessment? No   Discharge Plan discussed with: Patient   Communicated NAJMA with patient/caregiver Yes   Discharge Plan A Home Health   Discharge Plan B Home Health   DME Needed Upon Discharge  wound care supplies;negative pressure wound therapy device   Discharge Barriers Identified None   Why the patient remains in the hospital Requires continued medical care   Post-Acute Status   Post-Acute Authorization Home Health;Other  (Wound Vac)   Home Health Status Set-up Complete/Auth obtained   Other Status See Comments  (Awaiting education from [employer] or Wound Care Nurse.)   Patient choice form signed by patient/caregiver List with quality metrics by geographic area provided   Discharge Delays None known at this time

## 2022-02-21 NOTE — PROGRESS NOTES
[facility] Medicine  Progress Note    Patient name: Badnar Tomas  MRN: [id number]  Admit Date: 2/14/2022   LOS: 6 days     SUBJECTIVE:     Principal problem: Osteomyelitis of great toe of left foot    Interval History:  Afebrile the past 24 hours on IV antibiotics.  No leukocytosis.  NATHALIE improving on IV fluids.  Blood cultures remain no growth to date.  Plan for OR today with podiatry. Feels well otherwise    Hospital course  Patient was admitted for nonseptic osteomyelitis of the right great toe and NATHALIE. Patient was empirically started on IV antibiotics, IV fluids.  Wound culture grew Proteus.  Blood cultures remain no growth during hospitalization.      02/18  Overall feels better  Dont want to go home today and prefer going home Tomorrow AM     02/19  Discharge cancelled because pt need wound vac and should be ready when he goes home  Otherwise stable  No c/o     02/20  No new issues  Home Tomorrow AM once pt gets wound vac     Scheduled Meds:   aspirin  81 mg Oral Daily    collagenase   Topical (Top) Daily    enoxaparin  40 mg Subcutaneous Daily    hydrALAZINE  25 mg Oral Q8H    insulin detemir U-100  60 Units Subcutaneous Daily    levoFLOXacin  500 mg Oral Daily    pravastatin  40 mg Oral QHS    senna-docusate 8.6-50 mg  1 tablet Oral BID     Continuous Infusions:    PRN Meds:acetaminophen, calcium chloride IVPB, calcium chloride IVPB, calcium chloride IVPB, cloNIDine, dextrose 50%, dextrose 50%, glucagon (human recombinant), glucose, glucose, HYDROcodone-acetaminophen, insulin regular, magnesium oxide, magnesium sulfate IVPB, magnesium sulfate IVPB, magnesium sulfate IVPB, magnesium sulfate IVPB, naloxone, ondansetron, ondansetron, ondansetron, polyethylene glycol, potassium chloride in water, potassium chloride in water, potassium chloride in water, potassium chloride in water, potassium chloride, potassium chloride, potassium chloride, potassium chloride, sodium chloride  0.9%, sodium chloride 0.9%    Review of patient's allergies indicates:  No Known Allergies    Review of Systems  Review of Systems   Constitutional: Negative for fever.   HENT: Negative for hearing loss.    Eyes: Negative for blurred vision.   Respiratory: Negative for cough.    Cardiovascular: Negative for chest pain.   Gastrointestinal: Negative for heartburn.   Genitourinary: Negative for dysuria.   Musculoskeletal: Positive for myalgias.   Skin: Negative for rash.   Neurological: Negative for dizziness.   Endo/Heme/Allergies: Does not bruise/bleed easily.   Psychiatric/Behavioral: Negative for depression.       OBJECTIVE:     Vital Signs (Most Recent)  Temp: 98 °F (36.7 °C) (02/20/22 1652)  Pulse: 75 (02/20/22 1652)  Resp: 18 (02/20/22 1652)  BP: (!) 140/75 (02/20/22 1652)  SpO2: 97 % (02/20/22 1652)    Vital Signs Range (Last 24H):  Temp:  [97.8 °F (36.6 °C)-98.5 °F (36.9 °C)]   Pulse:  [75-90]   Resp:  [12-18]   BP: (136-152)/(75-85)   SpO2:  [96 %-98 %]     I & O (Last 24H):    Intake/Output Summary (Last 24 hours) at 2/20/2022 1926  Last data filed at 2/20/2022 1645  Gross per 24 hour   Intake 360 ml   Output 1765 ml   Net -1405 ml       Physical Exam:  General: Patient resting comfortably in no acute distress. Appears as stated age. Calm. Obese  Eyes: EOM intact. No conjunctivae injection. No scleral icterus.  ENT: Hearing grossly intact. No discharge from ears. No nasal discharge.   CVS: RRR. No LE edema BL.  Lungs: CTA BL, no wheezing or crackles. Good breath sounds. No accessory muscle use. No acute respiratory distress  Neuro: Alert. Cranial nerves grossly intact. Moves all extremities equally. Follows commands. Responds appropriately   Psych: Mood, behavior, thought content and judgement normal  Skin: bandage intact on LLE area            Laboratory:  All pertinent labs within the past 24 hours have been reviewed.  CBC:   Recent Labs   Lab 02/19/22  0508   WBC 7.58   HGB 9.3*   HCT 32.1*         CMP:   Recent Labs   Lab 02/19/22  0508   *   K 4.1   CL 98   CO2 29   GLU 81   BUN 15   CREATININE 1.2   CALCIUM 9.0   PROT 7.2   ALBUMIN 3.0*   BILITOT 0.4   ALKPHOS 76   AST 19   ALT 13   ANIONGAP 8   EGFRNONAA >60.0       Microbiology Results (last 7 days)     Procedure Component Value Units Date/Time    Culture, Anaerobic [[id number]] Collected: 02/17/22 1132    Order Status: Completed Specimen: Abscess from Toe, Left Foot Updated: 02/20/22 0915     Anaerobic Culture No anaerobes isolated    Narrative:      Left Great Toe Abscess    Aerobic culture [[id number]]  (Abnormal) Collected: 02/17/22 1132    Order Status: Completed Specimen: Abscess from Toe, Left Foot Updated: 02/20/22 0912     Aerobic Bacterial Culture PRESUMPTIVE PROTEUS SPECIES  Moderate  Identification and susceptibility pending        ENTEROCOCCUS SPECIES  Moderate  Identification and susceptibility pending      Narrative:      Left Great Toe Abscess    Blood culture #1 **CANNOT BE ORDERED STAT** [[id number]] Collected: 02/14/22 2124    Order Status: Completed Specimen: Blood from Peripheral, Upper Arm, Right Updated: 02/20/22 0232     Blood Culture, Routine No growth after 5 days.    Blood culture #2 **CANNOT BE ORDERED STAT** [[id number]] Collected: 02/14/22 2139    Order Status: Completed Specimen: Blood from Peripheral, Upper Arm, Right Updated: 02/20/22 0232     Blood Culture, Routine No growth after 5 days.    Aerobic culture [[id number]]  (Abnormal)  (Susceptibility) Collected: 02/14/22 2042    Order Status: Completed Specimen: Wound from Toe, Left Foot Updated: 02/16/22 0651     Aerobic Bacterial Culture PROTEUS VULGARIS  Moderate             Diagnostic Results:        ASSESSMENT/PLAN:     Active Hospital Problems    Diagnosis  POA    Diabetic ulcer of left foot [E11.621, L97.529]  Yes    Uncontrolled type 2 diabetes mellitus [E11.65]  Yes    Hypertension [I10]  Yes    Obesity (BMI 30.0-34.9) [E66.9]  Yes      Resolved Hospital  Problems    Diagnosis Date Resolved POA    *Osteomyelitis of great toe of left foot [M86.9] 02/19/2022 Yes    NATHALIE (acute kidney injury) [N17.9] 02/19/2022 Yes    Microcytic anemia [D50.9] 02/19/2022 Yes           Plan:   Continue present management   [facility]  HH arranged  Once wound vac is ready pt can go home Tomorrow AM       VTE Risk Mitigation (From admission, onward)         Ordered     enoxaparin injection 40 mg  Daily         02/14/22 2313     IP VTE HIGH RISK PATIENT  Once         02/14/22 2313     Place sequential compression device  Until discontinued         02/14/22 2313                        Patient care time was spent personally by me on the following activities: > 35 min  · Obtaining a history.  · Examination of patient.  · Providing medical care at the patients bedside.  · Developing a treatment plan with patient or surrogate and bedside caregivers.  · Ordering and reviewing laboratory studies, radiographic studies, pulse oximetry.  · Ordering and performing treatments and interventions.  · Evaluation of patient's response to treatment.  · Discussions with consultants while on the unit and immediately available to the patient.  · Re-evaluation of the patient's condition.  · Documentation in the medical record.       Department Hospital Medicine  [facility]  Romulo Galeas MD    Please note: This note was transcribed using voice recognition software. Because of this technology, there are often uinintended grammatical, spelling, and other transcription errors. Please disregard these errors.

## 2022-02-21 NOTE — CARE UPDATE
02/21/22 0746   PRE-TX-O2   O2 Device (Oxygen Therapy) room air   $ Is the patient on Low Flow Oxygen? Yes   SpO2 98 %   Pulse 74   Resp 17   Respiratory Evaluation   $ Care Plan Tech Time 15 min

## 2022-02-21 NOTE — PLAN OF CARE
Pt compliant with POC . RR even and unlabored no signs of distress noted.     Problem: Adult Inpatient Plan of Care  Goal: Plan of Care Review  Outcome: Ongoing, Progressing  Goal: Patient-Specific Goal (Individualized)  Outcome: Ongoing, Progressing  Goal: Absence of Hospital-Acquired Illness or Injury  Outcome: Ongoing, Progressing  Goal: Optimal Comfort and Wellbeing  Outcome: Ongoing, Progressing  Goal: Readiness for Transition of Care  Outcome: Ongoing, Progressing     Problem: Fluid and Electrolyte Imbalance (Acute Kidney Injury/Impairment)  Goal: Fluid and Electrolyte Balance  Outcome: Ongoing, Progressing     Problem: Oral Intake Inadequate (Acute Kidney Injury/Impairment)  Goal: Optimal Nutrition Intake  Outcome: Ongoing, Progressing     Problem: Renal Function Impairment (Acute Kidney Injury/Impairment)  Goal: Effective Renal Function  Outcome: Ongoing, Progressing     Problem: Diabetes Comorbidity  Goal: Blood Glucose Level Within Targeted Range  Outcome: Ongoing, Progressing     Problem: Impaired Wound Healing  Goal: Optimal Wound Healing  Outcome: Ongoing, Progressing     Problem: Skin Injury Risk Increased  Goal: Skin Health and Integrity  Outcome: Ongoing, Progressing     Problem: Oral Intake Inadequate  Goal: Improved Oral Intake  Outcome: Ongoing, Progressing     Problem: Fall Injury Risk  Goal: Absence of Fall and Fall-Related Injury  Outcome: Ongoing, Progressing

## 2022-02-21 NOTE — CARE UPDATE
02/20/22 2106   Patient Assessment/Suction   Level of Consciousness (AVPU) alert   Respiratory Effort Normal;Unlabored   Expansion/Accessory Muscles/Retractions no use of accessory muscles   All Lung Fields Breath Sounds clear;diminished   PRE-TX-O2   O2 Device (Oxygen Therapy) room air   SpO2 97 %   Pulse Oximetry Type Intermittent   $ Pulse Oximetry - Multiple Charge Pulse Oximetry - Multiple   Pulse 100   Resp 20   Incentive Spirometer   $ Incentive Spirometer Charges done with encouragement   Incentive Spirometer Predicted Level (mL) 3000   Administration (IS) mouthpiece utilized   Number of Repetitions (IS) 10   Level Incentive Spirometer (mL) 1500   Patient Tolerance (IS) good   Encourage incentive spirometer

## 2022-02-22 NOTE — DISCHARGE SUMMARY
[facility] Medicine  Discharge Summary      Patient Name: Bandar Tomas  MRN: [id number]  Patient Class: IP- Inpatient  Admission Date: 2/14/2022  Hospital Length of Stay: 7 days  Discharge Date and Time:  02/21/2022 6:43 PM  Attending Physician: No att. providers found   Discharging Provider: Romulo Galeas MD  Primary Care Provider: Veena Stroud MD      HPI:   Mr. Reyes is a 71-year-old male with a past medical history of hypertension, hyperlipidemia, insulin-dependent diabetes mellitus type 2, and obesity BMI 32 who presents today with complaints of a left toe infection.  It is severe.  It is associated with purulent drainage, malodor, and pain.  He denies fever, chills, nausea, vomiting, diarrhea, dizziness, chest pain, shortness of breath, loss of consciousness.  He has known injury to the toe.  He states he took off his today issues about 2 weeks ago and noticeable ulcerations status toe.  He went to the pharmacy and ask pharmacist for recommendations and was recommended triple antibiotic ointment and soaks for the toe which he did with no improvement.  He does not have a podiatrist.  He states his glucoses have been out of control over the last 2 weeks foot due to this infection.  He does not have an endocrinologist.  X-ray of foot in the ED revealed: Osteomyelitis of great toe proximal and distal phalanges. Pathologic fracture of great toe distal phalanx      Procedure(s) (LRB):  AMPUTATION, TOE (Left)      Hospital Course:   Patient admitted with Diabetic ulcer of Left Great toe and associated acute Osteomyelitis  Pt underwent L great toe amputation  Later pt was discharged to home with HH /wound vac  Extensive education was provided regarding his Uncontrolled DM type 2        Goals of Care Treatment Preferences:  Code Status: Full Code      Consults:   Consults (From admission, onward)        Status Ordering Provider     Inpatient consult to Vascular Surgery  Once        Provider:   "Juni Becker MD    Completed ISSAC ROJAS     Inpatient consult to Podiatry  Once        Provider:  Jesse Rodríguez DPM    Completed ANUSHA SHANNON          No new Assessment & Plan notes have been filed under this hospital service since the last note was generated.  Service: Hospital Medicine    Final Active Diagnoses:    Diagnosis Date Noted POA    Diabetic ulcer of left foot [E11.621, L97.529] 02/14/2022 Yes    Uncontrolled type 2 diabetes mellitus [E11.65] 02/14/2022 Yes    Hypertension [I10] 02/14/2022 Yes    Obesity (BMI 30.0-34.9) [E66.9] 02/14/2022 Yes      Problems Resolved During this Admission:    Diagnosis Date Noted Date Resolved POA    PRINCIPAL PROBLEM:  Osteomyelitis of great toe of left foot [M86.9] 02/14/2022 02/19/2022 Yes    NATHALIE (acute kidney injury) [N17.9] 02/14/2022 02/19/2022 Yes    Microcytic anemia [D50.9] 02/14/2022 02/19/2022 Yes       Discharged Condition: good    Disposition: Home-Health Care [facility]    Follow Up:   Contact information for follow-up providers     Jesse Rodríguez DPM Follow up in 1 week(s).    Specialties: Podiatry, Surgery, Wound Care  Contact information:  [address] [address]  [phone]                   Contact information for after-discharge care     Durable Medical Equipment     [employer] .    Service: Durable Medical Equipment  Contact information:  [facility]  [phone]                           Patient Instructions:      WOUND VAC FOR HOME USE     Order Specific Question Answer Comments   Height: 6' 1" (1.854 m)    Weight: 114 kg (251 lb 5.2 oz)    Does patient have medical equipment at home? glucometer    Length of need (1-99 months): 3      Ambulatory referral/consult to Wound Clinic   Standing Status: Future   Referral Priority: Routine Referral Type: Consultation   Referral Reason: Specialty Services Required   Requested Specialty: Wound Care   Number of Visits Requested: 1     Ambulatory " referral/consult to Home Health   Standing Status: Future   Referral Priority: Routine Referral Type: Home Health Care   Referral Reason: Specialty Services Required   Requested Specialty: Home Health Services   Number of Visits Requested: 1       Significant Diagnostic Studies: Labs: CMP No results for input(s): NA, K, CL, CO2, GLU, BUN, CREATININE, CALCIUM, PROT, ALBUMIN, BILITOT, ALKPHOS, AST, ALT, ANIONGAP, ESTGFRAFRICA, EGFRNONAA in the last 48 hours. and CBC No results for input(s): WBC, HGB, HCT, PLT in the last 48 hours.    Pending Diagnostic Studies:     None         Medications:  Reconciled Home Medications:      Medication List      START taking these medications    ciprofloxacin HCl 500 MG tablet  Commonly known as: CIPRO  Take 1 tablet (500 mg total) by mouth 2 (two) times daily.     collagenase ointment  Commonly known as: SANTYL  Apply topically once daily.     HYDROcodone-acetaminophen 5-325 mg per tablet  Commonly known as: NORCO  Take 1 tablet by mouth every 12 (twelve) hours as needed for Pain.        CONTINUE taking these medications    aspirin 81 MG Chew  Take 81 mg by mouth once daily.     insulin  unit/mL injection  Inject 25 Units into the skin 2 (two) times daily before meals.     insulin regular 100 unit/mL injection  Inject 25 Units into the skin 2 (two) times daily before meals.     losartan 100 MG tablet  Commonly known as: COZAAR  Take 100 mg by mouth once daily.     pravastatin 40 MG tablet  Commonly known as: PRAVACHOL  Take 40 mg by mouth once daily.     spironolactone 50 MG tablet  Commonly known as: ALDACTONE  Take 50 mg by mouth once daily.            Indwelling Lines/Drains at time of discharge:   Lines/Drains/Airways     None               Physical Exam   Constitutional: He is oriented to person, place, and time.   Cardiovascular: Normal rate.   Neurological: He is alert and oriented to person, place, and time.     Time spent on the discharge of patient: 45  minutes          Romulo Galeas MD  Department of Hospital Medicine  [facility]

## 2022-02-22 NOTE — HOSPITAL COURSE
Patient admitted with Diabetic ulcer of Left Great toe and associated acute Osteomyelitis  Pt underwent L great toe amputation  Later pt was discharged to home with HH /wound vac  Extensive education was provided regarding his Uncontrolled DM type 2

## 2022-03-01 NOTE — PATIENT INSTRUCTIONS
Your Amputation Surgery     A flap of muscle and skin is brought over the end of the bone and sutured or stapled in place. Location of the sutures or staples vary.   Amputation is a surgery to remove a limb or part of a limb. It is done because tissue in the limb is diseased or damaged and cant be healed. The surgeon saves as much of your limb as possible. This may include joints, such as the knee. But you may not know before the surgery how much of the limb will remain. Amputation is intended to restore your ability to function. This is because removing your diseased or damaged limb can improve your health. Common causes of amputation include peripheral artery disease that causes ischemia, trauma, diabetes, infection, and cancer.  During the surgery  You' will receive either general anesthesia or a local/regional anesthesia. The surgeon divides damaged tissue from healthy tissue. This includes skin, muscle, bone, blood vessels, and nerves. Then the surgeon removes the damaged part of the limb. The remaining nerves are cut short and allowed to pull back into the healthy tissue. This protects and cushions them. The end of the cut bone is trimmed and the edges are smoothed for comfort. A flap of healthy muscle and skin is left behind when the damaged tissue is removed. The flap is brought snugly over the bone to cover the end of the amputated limb. If there isnt enough tissue in the flap, the surgeon takes skin or tissue from another part of the body. The flap is then sutured or stapled in place. The wound may be left open at first if debris is present or infection is possible. This allows fluid to drain and helps the wound heal cleanly. The wound will later be closed by the surgeon.   Risks and complications of surgery  Further surgery needed to remove more of the limb  Infection  Hemorrhage (severe bleeding)  Death  Pain, including nerve-related symptoms, chronic pain, and phantom limb pain  Blood clots  Heart  problems (heart attack, arrhythmias, heart failure0  Flexion contractions  Depression  After the surgery  When you wake up, youll be on pain medication to help keep you comfortable. It will likely be given along with fluids through an IV line thats placed in a vein. Later, youll be switched to oral pain medications as needed. Youll have a splint or some other form of pressure dressing on your residual limb. This helps control swelling and aid healing. You may  be started on medicine to prevent blood clots depending on your surgery. You will receive antibiotics just before surgery. These may continue after surgery, as well. You may have a urinary catheter for a short time.  Recovering in the hospital  Youll stay in the hospital for 3 to 7 days. Your stay may be longer or shorter. This will depend on your overall health and how quickly you heal. While in the hospital, youll begin physical therapy. This will help stretch and strengthen your muscles. It will also help prevent shortening of muscle or joint tightening (contracture). Youll learn how to safely transfer between your bed and other surfaces, such as a chair. This helps prevent falls so that your healing wound is protected. Later, you may be able to move around using a walker or crutches, if your surgery was on a lower limb. You may work with an occupational therapist. He or she can help you resume tasks, such as showering and dressing.  Going home  Youll be ready to go home when your pain is controlled by oral medications. Youll also need to be able to move safely between surfaces. If youre having trouble with these tasks, you may need further help. This may mean going to a nursing center or a rehabilitation unit, if your amputation was on the lower limb, If your surgery was on a lower limb, youll likely go home in a wheelchair. It may have an amputee board (a special platform) to support your residual limb.  At home  At home, youll need to keep  doing the exercises you were taught in the hospital. This will help prepare your residual limb to be fitted for a prosthesis (artificial limb). At all times, take care to move around safely to avoid falls. Falling can reopen your wound. Use your wheelchair, walker, or crutches at all times, if you received them.  Following up with the surgeon  Youll need to follow up with the surgeon about 5 to 7 days after you go home. The surgeon will check how your wound is healing. The sutures or staples will likely come out about 2 to 3 weeks after surgery. This could be longer if you heal more slowly due to other health problems. After healing is complete, you may be able to be fitted for a prosthesis.  When to call the [facility] Diabetes Foot Care Program    Every day you depend on your feet to keep you moving. But when you have diabetes, your feet need special care. Even a small foot problem can become very serious. So dont take your feet for granted. By working with your diabetes healthcare team, you can learn how to protect your feet and keep them healthy.  Evaluating your feet  An evaluation helps your healthcare provider check the condition of your feet. The evaluation includes a review of your diabetes history and overall health. It may also include a foot exam, X-rays, or other tests. These can help show problems beneath the skin that you cant see or feel.  Medical history  You will be asked about your overall health and any history of foot problems. Youll also discuss your diabetes history, such as whether your blood sugar level has changed over time. It also includes questions about sensations of pain, tingling, pins and needles, or numbness. Your healthcare provider will also want to know if you have high blood pressure and heart disease, or if you smoke. Be sure to mention any medicines (including over-the-counter), supplements, or herbal remedies you take.  Foot exam  A foot exam checks the condition of  different parts of your foot. First, your skin and nails are examined for any signs of infection. Blood flow is checked by feeling for the pulses in each foot. You may also have tests to study the nerves in the foot. These include using a small filament (wire) to see how sensitive your feet are. In certain cases, you will be asked to walk a short distance to check for bone, joint, and muscle problems.  Diagnostic tests  If needed, your healthcare provider will suggest certain tests to learn more about your feet. These include:  Doppler tests to measure blood flow in the feet and lower leg.  X-rays, which can show bone or joint problems.  Other imaging tests, such as an MRI (magnetic resonance imaging), bone scan, and CT (computed tomography) scan. These can help show bone infections.  Other tests, such as vascular tests, which study the blood flow in your feet and legs. You may also have nerve studies to learn how sensitive your feet are.  Creating a foot care program  Based on the evaluation, your healthcare provider will create a foot care program for you. Your program may be as simple as starting a daily self-care routine and changing the types of shoes your wear. It may also involve treating minor foot problems, such as a corn or blister. In some cases, surgery will be needed to treat an infection or mechanical problems, such as hammer toes.  Preventing problems  When you have diabetes, its easier to prevent problems than to treat them later on. So see your healthcare team for regular checkups and foot care. Your healthcare team can also help you learn more about caring for your feet at home. For example, you may be told to avoid walking barefoot. Or you may be told that special footwear is needed to protect your feet.  Have regular checkups  Foot problems can develop quickly. So be sure to follow your healthcare teams schedule for regular checkups. During office visits, take off your shoes and socks as soon  as you get in the exam room. Ask your healthcare provider to examine your feet for problems. This will make it easier to find and treat small skin irritations before they get worse. Regular checkups can also help keep track of the blood flow and feeling in your feet. If you have neuropathy (lack of feeling in your feet), you will need to have checkups more often.  Learn about self-care  The more you know about diabetes and your feet, the easier it will be to prevent problems. Members of your healthcare team can teach you how to inspect your feet and teach you to look for warning signs. They can also give you other foot care tips. During office visits, be sure to ask any questions you have.  Date Last Reviewed: 7/1/2016  © 5003-8283 [employer]. [address] [place] [address]. All rights reserved. This information is not intended as a substitute for professional medical care. Always follow your healthcare professional's instructions.         Check your wound at home as directed by the surgeon. Call your surgeon right away if you have any of the following problems:  Fever of 100.4ºF (38.0°C) or higher  Chills  Red streaks on the skin around the wound  Thick, cloudy, or yellow-brown drainage or odor from the wound  Wound separation (skin pulls apart)  Severe increase in pain   Date Last Reviewed: 11/15/2015  © 8957-9797 [employer]. [address] [place] [address]. All rights reserved. This information is not intended as a substitute for professional medical care. Always follow your healthcare professional's instructions.

## 2022-03-02 ENCOUNTER — OFFICE VISIT (OUTPATIENT)
Dept: PODIATRY | Facility: CLINIC | Age: 72
End: 2022-03-02
Payer: MEDICARE

## 2022-03-02 VITALS — BODY MASS INDEX: 33.27 KG/M2 | OXYGEN SATURATION: 96 % | WEIGHT: 251 LBS | HEIGHT: 73 IN | HEART RATE: 78 BPM

## 2022-03-02 DIAGNOSIS — L97.522 ULCER OF LEFT FOOT WITH FAT LAYER EXPOSED: ICD-10-CM

## 2022-03-02 DIAGNOSIS — M79.672 LEFT FOOT PAIN: ICD-10-CM

## 2022-03-02 DIAGNOSIS — E11.621 TYPE 2 DIABETES MELLITUS WITH FOOT ULCER, UNSPECIFIED WHETHER LONG TERM INSULIN USE: ICD-10-CM

## 2022-03-02 DIAGNOSIS — L97.509 TYPE 2 DIABETES MELLITUS WITH FOOT ULCER, UNSPECIFIED WHETHER LONG TERM INSULIN USE: ICD-10-CM

## 2022-03-02 DIAGNOSIS — Z89.412 STATUS POST AMPUTATION OF LEFT GREAT TOE: ICD-10-CM

## 2022-03-02 DIAGNOSIS — M86.9 OSTEOMYELITIS OF GREAT TOE OF LEFT FOOT: ICD-10-CM

## 2022-03-02 DIAGNOSIS — E11.42 DIABETIC POLYNEUROPATHY ASSOCIATED WITH TYPE 2 DIABETES MELLITUS: Primary | ICD-10-CM

## 2022-03-02 PROCEDURE — 99214 OFFICE O/P EST MOD 30 MIN: CPT | Mod: 25,S$GLB,, | Performed by: PODIATRIST

## 2022-03-02 PROCEDURE — 11043 DBRDMT MUSC&/FSCA 1ST 20/<: CPT | Mod: S$GLB,,, | Performed by: PODIATRIST

## 2022-03-02 PROCEDURE — 99214 PR OFFICE/OUTPT VISIT, EST, LEVL IV, 30-39 MIN: ICD-10-PCS | Mod: 25,S$GLB,, | Performed by: PODIATRIST

## 2022-03-02 PROCEDURE — 11043 WOUND DEBRIDEMENT: ICD-10-PCS | Mod: S$GLB,,, | Performed by: PODIATRIST

## 2022-03-02 RX ORDER — HYDROCODONE BITARTRATE AND ACETAMINOPHEN 5; 325 MG/1; MG/1
1 TABLET ORAL EVERY 6 HOURS PRN
Qty: 30 TABLET | Refills: 0 | Status: SHIPPED | OUTPATIENT
Start: 2022-03-02

## 2022-03-02 RX ORDER — HYDROCHLOROTHIAZIDE 25 MG/1
25 TABLET ORAL EVERY MORNING
COMMUNITY
Start: 2022-01-04

## 2022-03-02 NOTE — PROGRESS NOTES
"  [address] [facility] [address]  [place] [address]  Phone: [phone]   [phone]    Patient's PCP:Veena Stroud MD  Referring Provider:No ref. provider found    Subjective:      Chief Complaint: Post-Op      Systemic Doctor: Veena Stroud MD  Date Last Seen: about 4 months ago  Blood Sugar: doesn't check (don't have machine picking up today)  Hemoglobin A1c: 16.4 (2/15/2022)    Date of Surgery: 2/17/2022  Procedure: Amputation left great toe    HPI:   Bandar Tomas is a 71 y.o. male who returns to the clinic today for his post-operative visit.  Patient had amputation of the left great toe.  The surgical site was left open due to lack of viable skin.  Wound VAC therapy was started in the hospital and continued once discharged.     Bandar Tomas rates pain a 7/10 on a pain scale. Compliance of Care:  Patient presents in bandage, ace wrap surgery shoe and wound vac.    Vitals:    03/02/22 1106   Pulse: 78   SpO2: 96%   Weight: 113.9 kg (251 lb)   Height: 6' 1" (1.854 m)   PainSc:   7        Past Surgical History:   Procedure Laterality Date    COLONOSCOPY  prior to 2007    COLONOSCOPY N/A 3/31/2016    Procedure: COLONOSCOPY;  Surgeon: Perry Rodriguez MD;  Location: [facility];  Service: Endoscopy;  Laterality: N/A;    SHOULDER SURGERY Left     TOE AMPUTATION Left 2/17/2022    Procedure: AMPUTATION, TOE;  Surgeon: Jesse Rodríguez DPM;  Location: [facility];  Service: Podiatry;  Laterality: Left;    UPPER GASTROINTESTINAL ENDOSCOPY       Past Medical History:   Diagnosis Date    Diabetes     Former smoker     HTN (hypertension)      Family History   Problem Relation Age of Onset    Brain cancer Brother 54    Esophageal cancer Paternal Grandfather 87    Colon polyps Neg Hx         Social History:   Marital Status:   Alcohol History:  reports current alcohol use.  Tobacco History:  reports that he has quit smoking. He does not have any smokeless tobacco history on file.  Drug History:  " reports no history of drug use.    Review of patient's allergies indicates:  No Known Allergies    Current Outpatient Medications   Medication Sig Dispense Refill    aspirin 81 MG Chew Take 81 mg by mouth once daily.      ciprofloxacin HCl (CIPRO) 500 MG tablet Take 1 tablet (500 mg total) by mouth 2 (two) times daily. 60 tablet 0    collagenase (SANTYL) ointment Apply topically once daily. 30 g 0    hydroCHLOROthiazide (HYDRODIURIL) 25 MG tablet Take 25 mg by mouth every morning.      insulin  unit/mL injection Inject 25 Units into the skin 2 (two) times daily before meals.      insulin regular 100 unit/mL Inj injection Inject 25 Units into the skin 2 (two) times daily before meals.      losartan (COZAAR) 100 MG tablet Take 100 mg by mouth once daily.      pravastatin (PRAVACHOL) 40 MG tablet Take 40 mg by mouth once daily.      spironolactone (ALDACTONE) 50 MG tablet Take 50 mg by mouth once daily.      HYDROcodone-acetaminophen (NORCO) 5-325 mg per tablet Take 1 tablet by mouth every 6 (six) hours as needed for Pain. 30 tablet 0     No current facility-administered medications for this visit.       Review of Systems   Constitutional: Negative for chills, fatigue, fever and unexpected weight change.   HENT: Negative for hearing loss and trouble swallowing.    Eyes: Negative for photophobia and visual disturbance.   Respiratory: Negative for cough, shortness of breath and wheezing.    Cardiovascular: Negative for chest pain, palpitations and leg swelling.   Gastrointestinal: Negative for abdominal pain and nausea.   Genitourinary: Negative for dysuria and frequency.   Musculoskeletal: Positive for gait problem. Negative for arthralgias, back pain, joint swelling and myalgias.   Skin: Positive for color change and wound. Negative for rash.   Neurological: Positive for numbness. Negative for seizures, weakness and headaches.   Hematological: Bruises/bleeds easily.         Objective:          Imaging:      Physical Exam:   Foot Exam    General  Orientation: alert and oriented to person, place, and time   Affect: appropriate   Gait: antalgic       Left Foot/Ankle      Inspection and Palpation  Ecchymosis: none  Tenderness: great toe metatarsophalangeal joint   Swelling: great toe metatarsophalangeal joint   Skin Exam: ulcer; no drainage and no erythema   Neurovascular  Dorsalis pedis: 1+  Posterior tibial: 1+  Capillary refill: 3+  Saphenous nerve sensation: diminished  Tibial nerve sensation: diminished  Superficial peroneal nerve sensation: diminished  Deep peroneal nerve sensation: diminished  Sural nerve sensation: diminished          Physical Exam  Cardiovascular:      Pulses:           Dorsalis pedis pulses are 1+ on the left side.        Posterior tibial pulses are 1+ on the left side.   Musculoskeletal:        Feet:    Feet:      Left foot:      Skin integrity: Ulcer present. No erythema.            Assessment:       1. Diabetic polyneuropathy associated with type 2 diabetes mellitus    2. Type 2 diabetes mellitus with foot ulcer, unspecified whether long term insulin use    3. Ulcer of left foot with fat layer exposed    4. Status post amputation of left great toe    5. Osteomyelitis of great toe of left foot    6. Left foot pain      Plan:   Diabetic polyneuropathy associated with type 2 diabetes mellitus    Type 2 diabetes mellitus with foot ulcer, unspecified whether long term insulin use    Ulcer of left foot with fat layer exposed  -     Ambulatory referral/consult to Wound Clinic; Future; Expected date: 03/09/2022  -     Wound Debridement    Status post amputation of left great toe  -     HYDROcodone-acetaminophen (NORCO) 5-325 mg per tablet; Take 1 tablet by mouth every 6 (six) hours as needed for Pain.  Dispense: 30 tablet; Refill: 0  -     Ambulatory referral/consult to Wound Clinic; Future; Expected date: 03/09/2022  -     Ambulatory referral/consult to Home Health; Future; Expected date:  "03/03/2022  -     Wound Debridement    Osteomyelitis of great toe of left foot  -     HYDROcodone-acetaminophen (NORCO) 5-325 mg per tablet; Take 1 tablet by mouth every 6 (six) hours as needed for Pain.  Dispense: 30 tablet; Refill: 0  -     Ambulatory referral/consult to Home Health; Future; Expected date: 03/03/2022    Left foot pain  -     HYDROcodone-acetaminophen (NORCO) 5-325 mg per tablet; Take 1 tablet by mouth every 6 (six) hours as needed for Pain.  Dispense: 30 tablet; Refill: 0  -     Ambulatory referral/consult to Wound Clinic; Future; Expected date: 03/09/2022  -     Ambulatory referral/consult to Home Health; Future; Expected date: 03/03/2022  -     Wound Debridement      Follow up if symptoms worsen or fail to improve.    Wound Debridement    Date/Time: 3/2/2022 10:40 AM  Performed by: Jesse Rodríguez DPM  Authorized by: Jesse Rodríguez DPM     Time out: Immediately prior to procedure a "time out" was called to verify the correct patient, procedure, equipment, support staff and site/side marked as required.    Consent Done?:  Yes (Verbal)    Preparation: Patient was prepped and draped in usual sterile fashion    Local anesthesia used?: No      Wound Details:    Location:  Left foot    Location:  Left 1st Metatarsal Head    Type of Debridement:  Excisional       Length (cm):  6       Area (sq cm):  12.6       Width (cm):  2.1       Percent Debrided (%):  100       Depth (cm):  1       Total Area Debrided (sq cm):  12.6    Depth of debridement:  Muscle/fascia/tendon    Tissue debrided:  Subcutaneous, Fascia, Dermis and Epidermis    Devitalized tissue debrided:  Biofilm, Slough, Fibrin and Exudate    Instruments:  Curette, Nippers and Forceps    Bleeding:  Moderate  Hemostasis Achieved: Yes    Method Used:  Pressure and Alginate  Patient tolerance:  Patient tolerated the procedure well with no immediate complications         Patient has had significant granulation since the time of surgery.  Plan now is to " continue home health with 3 times weekly wound VAC changes.  He has continue the antibiotics prescribed during his admission.  I am also going to refer the patient to the outpatient wound care center for further evaluation and management.  Patient is to continue with limited weight-bearing in surgical shoe.    This note was created using Dragon voice recognition software that occasionally misinterpreted phrases or words.

## 2022-03-04 ENCOUNTER — OFFICE VISIT (OUTPATIENT)
Dept: WOUND CARE | Facility: HOSPITAL | Age: 72
End: 2022-03-04
Attending: PODIATRIST
Payer: MEDICARE

## 2022-03-04 VITALS
RESPIRATION RATE: 18 BRPM | SYSTOLIC BLOOD PRESSURE: 115 MMHG | TEMPERATURE: 98 F | HEART RATE: 99 BPM | DIASTOLIC BLOOD PRESSURE: 93 MMHG

## 2022-03-04 DIAGNOSIS — L97.513 ULCER OF RIGHT FOOT WITH NECROSIS OF MUSCLE: ICD-10-CM

## 2022-03-04 DIAGNOSIS — E11.628 DIABETIC FOOT INFECTION: ICD-10-CM

## 2022-03-04 DIAGNOSIS — E11.65 UNCONTROLLED TYPE 2 DIABETES MELLITUS WITH HYPERGLYCEMIA: ICD-10-CM

## 2022-03-04 DIAGNOSIS — Z91.89 AT HIGH RISK FOR INADEQUATE NUTRITIONAL INTAKE: ICD-10-CM

## 2022-03-04 DIAGNOSIS — E66.9 OBESITY (BMI 30.0-34.9): ICD-10-CM

## 2022-03-04 DIAGNOSIS — Z91.199 NON COMPLIANCE WITH MEDICAL TREATMENT: ICD-10-CM

## 2022-03-04 DIAGNOSIS — Z89.432 HISTORY OF AMPUTATION OF LEFT FOOT: ICD-10-CM

## 2022-03-04 DIAGNOSIS — L97.524 ULCER OF LEFT FOOT WITH NECROSIS OF BONE: Primary | ICD-10-CM

## 2022-03-04 DIAGNOSIS — L08.9 DIABETIC FOOT INFECTION: ICD-10-CM

## 2022-03-04 PROCEDURE — 99214 OFFICE O/P EST MOD 30 MIN: CPT | Mod: 25,24,, | Performed by: PODIATRIST

## 2022-03-04 PROCEDURE — 11043 DBRDMT MUSC&/FSCA 1ST 20/<: CPT | Performed by: PODIATRIST

## 2022-03-04 PROCEDURE — 11042 DBRDMT SUBQ TIS 1ST 20SQCM/<: CPT | Mod: 59,,, | Performed by: PODIATRIST

## 2022-03-04 PROCEDURE — 11043 PR DEBRIDEMENT, SKIN, SUB-Q TISSUE,MUSCLE,=<20 SQ CM: ICD-10-PCS | Mod: ,,, | Performed by: PODIATRIST

## 2022-03-04 PROCEDURE — 99213 OFFICE O/P EST LOW 20 MIN: CPT | Mod: 25 | Performed by: PODIATRIST

## 2022-03-04 PROCEDURE — 99214 PR OFFICE/OUTPT VISIT, EST, LEVL IV, 30-39 MIN: ICD-10-PCS | Mod: 25,24,, | Performed by: PODIATRIST

## 2022-03-04 PROCEDURE — 11042 DBRDMT SUBQ TIS 1ST 20SQCM/<: CPT | Mod: 59 | Performed by: PODIATRIST

## 2022-03-04 PROCEDURE — 11042 PR DEBRIDEMENT, SKIN, SUB-Q TISSUE,=<20 SQ CM: ICD-10-PCS | Mod: 59,,, | Performed by: PODIATRIST

## 2022-03-04 PROCEDURE — 11043 DBRDMT MUSC&/FSCA 1ST 20/<: CPT | Mod: ,,, | Performed by: PODIATRIST

## 2022-03-04 NOTE — PROGRESS NOTES
[address] [facility] [address]  [place] [address]  Phone: [phone]   [phone]    Patient's PCP:Veena Stroud MD  Referring Provider: No ref. provider found    Subjective:      Chief Complaint:: Wound Care, Diabetes, Wound Infection, and Diabetic Foot Ulcer    HPI  Bandar Tomas is a 71 y.o. male  with past medical history of hypertension, hyperlipidemia, uncontrolled diabetes, diabetic neuropathy who presents to clinic with a complaint of left great toe ulcer status post amputation of left great toe lasting for approximately 2 weeks. Onset of symptoms was initially patient had a severe infected wound on the left foot and was admitted to the hospital.  See discharge summary below.  He underwent a left great toe amputation with keeping the amputation site open due to not having enough viable skin for closure.  A wound VAC was started.  Referral to wound care was placed.    Upon presentation today, patient also has a open wound on the plantar right foot.  Patient states he does not check his feet so is unsure how long this has been present.  Patient's daughter is bedside and states patient lives alone and is not compliant with his diabetic medication, checking his feet, and additional recommendations by his doctors.  Counseled patient on the need to be compliant.  Counseled patient that if he is not compliant he is risking limb loss as well.    1. Debridement See Wound Docs for assessment of wounds and procedure notes  2. Continue taking all medications as prescribed, ciprofloxacin   3. Continue home health dressing changes, wound VAC to left foot wound.  Iodosorb with football wrap to right foot wound  4. RTC one week   5. Counseled patient on increasing protein intake, not getting wounds wet, keeping dressings clean dry and intact, following a healthy diet, elevating legs when able, removing pressure from wounds    Total time spent for E&M 30  Total time for debridement 35 minutes     Counseled patient  regarding the need to get his blood sugars under control.  Discussed with patient that with blood sugars at this high level it is very difficult to heal wounds.      Discharge summary on 02/21/2022:  HPI:   Mr. Reyes is a 71-year-old male with a past medical history of hypertension, hyperlipidemia, insulin-dependent diabetes mellitus type 2, and obesity BMI 32 who presents today with complaints of a left toe infection.  It is severe.  It is associated with purulent drainage, malodor, and pain.  He denies fever, chills, nausea, vomiting, diarrhea, dizziness, chest pain, shortness of breath, loss of consciousness.  He has known injury to the toe.  He states he took off his today issues about 2 weeks ago and noticeable ulcerations status toe.  He went to the pharmacy and ask pharmacist for recommendations and was recommended triple antibiotic ointment and soaks for the toe which he did with no improvement.  He does not have a podiatrist.  He states his glucoses have been out of control over the last 2 weeks foot due to this infection.  He does not have an endocrinologist.  X-ray of foot in the ED revealed: Osteomyelitis of great toe proximal and distal phalanges. Pathologic fracture of great toe distal phalanx  Procedure(s) (LRB):  AMPUTATION, TOE (Left)    Hospital Course:   Patient admitted with Diabetic ulcer of Left Great toe and associated acute Osteomyelitis  Pt underwent L great toe amputation  Later pt was discharged to home with HH /wound vac  Extensive education was provided regarding his Uncontrolled DM type 2          Systemic Doctor: Veena Stroud MD  Date Last Seen: about 4 months ago  Blood Sugar: doesn't check  Hemoglobin A1c: 16.4 (2/15/2022)       Patient should call the office immediately if any signs of infection, such as fever, chills, sweats, increased redness or pain.    Patient was instructed to call the clinic or go to the emergency department if their symptoms do not improve, worsens, or  if new symptoms develop.  Patient was advised that if any increased swelling, pain, or numbness arise to go immediately to the ED. Patient knows to call any time if an emergency arises. Shared decision making occurred and patient verbalized understanding in agreement with this plan.       Counseling/Education:  I provided patient education verbally regarding:   The aspects of diabetes and how it pertains to the feet. I explained the importance of proper diabetic foot care and how it is essential for the health of their feet.    I discussed the importance of knowing their Hemoglobin A1c and that the level needs to be as close to 6 as possible. I discussed the increase complications of high blood sugar including stroke, blindness, heart attack, kidney failure and loss of limb secondary to neuropathy and PVD.     With neuropathy, beware of any breaks in the skin or redness. These areas are not recognized early due to the numbness.    I discussed Diabetes, lower back issues, metabolic disorders, systemic causes, chemotherapy, vitamin deficiency, heavy metal exposure, as some of the causes. I also explained that as much as 40% of the time we can not find a cause. I discussed different treatments available to control the symptoms but which may not cure the problem.       Counseled patient on the aspects of diabetes and how it pertains to the feet.  I explained the importance of proper diabetic foot care and how it is essential for the health of their feet.      Shoe inspection. Patient instructed on proper foot hygeine. We discussed wearing proper shoe gear, daily foot inspections, never walking without protective shoe gear, never putting sharp instruments to feet, routine podiatric nail visits every 2-3 months.      I counseled the patient on their conditions, their implications and medical management.     >50% of this > 60 minute visit was spent face to face educating/counseling the patient    I spent a total of 60  minutes on the day of the visit.This includes face to face time and non-face to face time preparing to see the patient (eg, review of tests), obtaining and/or reviewing separately obtained history, documenting clinical information in the electronic or other health record, independently interpreting results and communicating results to the patient/family/caregiver, or care coordinator.       Much of the documentation for this visit was completed in the Wound Docs system.  Please see the attached documentation for further details about the patient's care. Scanned under the Media tab.        Kirsty Rodríguez DPM       Vitals:    03/04/22 1543   BP: (!) 115/93   Pulse: 99   Resp: 18   Temp: 98.2 °F (36.8 °C)   TempSrc: Skin   PainSc: 0-No pain      Shoe Size:     Past Surgical History:   Procedure Laterality Date    COLONOSCOPY  prior to 2007    COLONOSCOPY N/A 3/31/2016    Procedure: COLONOSCOPY;  Surgeon: Perry Rodriguez MD;  Location: [facility];  Service: Endoscopy;  Laterality: N/A;    SHOULDER SURGERY Left     TOE AMPUTATION Left 2/17/2022    Procedure: AMPUTATION, TOE;  Surgeon: Jesse Rodríguez DPM;  Location: [facility];  Service: Podiatry;  Laterality: Left;    UPPER GASTROINTESTINAL ENDOSCOPY       Past Medical History:   Diagnosis Date    Diabetes     Former smoker     HTN (hypertension)      Family History   Problem Relation Age of Onset    Brain cancer Brother 54    Esophageal cancer Paternal Grandfather 87    Colon polyps Neg Hx         Social History:   Marital Status:   Alcohol History:  reports current alcohol use.  Tobacco History:  reports that he has quit smoking. He does not have any smokeless tobacco history on file.  Drug History:  reports no history of drug use.    Review of patient's allergies indicates:  No Known Allergies    Current Outpatient Medications   Medication Sig Dispense Refill    aspirin 81 MG Chew Take 81 mg by mouth once daily.      ciprofloxacin HCl (CIPRO) 500  MG tablet Take 1 tablet (500 mg total) by mouth 2 (two) times daily. 60 tablet 0    collagenase (SANTYL) ointment Apply topically once daily. 30 g 0    hydroCHLOROthiazide (HYDRODIURIL) 25 MG tablet Take 25 mg by mouth every morning.      HYDROcodone-acetaminophen (NORCO) 5-325 mg per tablet Take 1 tablet by mouth every 6 (six) hours as needed for Pain. 30 tablet 0    insulin  unit/mL injection Inject 25 Units into the skin 2 (two) times daily before meals.      insulin regular 100 unit/mL Inj injection Inject 25 Units into the skin 2 (two) times daily before meals.      losartan (COZAAR) 100 MG tablet Take 100 mg by mouth once daily.      pravastatin (PRAVACHOL) 40 MG tablet Take 40 mg by mouth once daily.      spironolactone (ALDACTONE) 50 MG tablet Take 50 mg by mouth once daily.       No current facility-administered medications for this visit.       Review of Systems   Constitutional: Negative for chills, fatigue, fever and unexpected weight change.   HENT: Negative for hearing loss and trouble swallowing.    Eyes: Negative for photophobia and visual disturbance.   Respiratory: Negative for cough, shortness of breath and wheezing.    Cardiovascular: Negative for chest pain, palpitations and leg swelling.   Gastrointestinal: Negative for abdominal pain and nausea.   Genitourinary: Negative for dysuria and frequency.   Musculoskeletal: Negative for arthralgias, back pain, gait problem, joint swelling and myalgias.   Skin: Positive for wound. Negative for rash.   Neurological: Negative for tremors, seizures, weakness, numbness and headaches.   Hematological: Does not bruise/bleed easily.         Objective:        Physical Exam:   Foot Exam  Physical Exam    Imaging:            Assessment:       1. Ulcer of left foot with necrosis of bone    2. Uncontrolled type 2 diabetes mellitus with hyperglycemia    3. Obesity (BMI 30.0-34.9)    4. History of amputation of left foot    5. Diabetic foot infection     6. At high risk for inadequate nutritional intake    7. Non compliance with medical treatment    8. Ulcer of right foot with necrosis of muscle      Plan:   Ulcer of left foot with necrosis of bone    Uncontrolled type 2 diabetes mellitus with hyperglycemia    Obesity (BMI 30.0-34.9)    History of amputation of left foot    Diabetic foot infection    At high risk for inadequate nutritional intake    Non compliance with medical treatment    Ulcer of right foot with necrosis of muscle      Follow up in about 1 week (around 3/11/2022).    Procedures          Counseling:     I provided patient education verbally regarding:   Patient diagnosis, treatment options, as well as alternatives, risks, and benefits.     This note was created using Dragon voice recognition software that occasionally misinterpreted phrases or words.

## 2022-03-11 ENCOUNTER — OFFICE VISIT (OUTPATIENT)
Dept: WOUND CARE | Facility: HOSPITAL | Age: 72
End: 2022-03-11
Attending: PODIATRIST
Payer: MEDICARE

## 2022-03-11 VITALS
DIASTOLIC BLOOD PRESSURE: 72 MMHG | HEART RATE: 88 BPM | RESPIRATION RATE: 18 BRPM | SYSTOLIC BLOOD PRESSURE: 143 MMHG | TEMPERATURE: 98 F

## 2022-03-11 DIAGNOSIS — L97.522 ULCER OF LEFT FOOT WITH FAT LAYER EXPOSED: Primary | ICD-10-CM

## 2022-03-11 DIAGNOSIS — L97.513 ULCER OF RIGHT FOOT WITH NECROSIS OF MUSCLE: ICD-10-CM

## 2022-03-11 DIAGNOSIS — R09.89 DECREASED PEDAL PULSES: ICD-10-CM

## 2022-03-11 DIAGNOSIS — Z91.89 AT HIGH RISK FOR INADEQUATE NUTRITIONAL INTAKE: ICD-10-CM

## 2022-03-11 DIAGNOSIS — Z91.199 NON COMPLIANCE WITH MEDICAL TREATMENT: ICD-10-CM

## 2022-03-11 DIAGNOSIS — E66.9 OBESITY (BMI 30.0-34.9): ICD-10-CM

## 2022-03-11 DIAGNOSIS — E11.628 DIABETIC FOOT INFECTION: ICD-10-CM

## 2022-03-11 DIAGNOSIS — E11.65 UNCONTROLLED TYPE 2 DIABETES MELLITUS WITH HYPERGLYCEMIA: ICD-10-CM

## 2022-03-11 DIAGNOSIS — Z89.432 HISTORY OF AMPUTATION OF LEFT FOOT: ICD-10-CM

## 2022-03-11 DIAGNOSIS — L08.9 DIABETIC FOOT INFECTION: ICD-10-CM

## 2022-03-11 DIAGNOSIS — L97.524 ULCER OF LEFT FOOT WITH NECROSIS OF BONE: ICD-10-CM

## 2022-03-11 PROCEDURE — 99214 OFFICE O/P EST MOD 30 MIN: CPT | Mod: 25,,, | Performed by: PODIATRIST

## 2022-03-11 PROCEDURE — 11042 PR DEBRIDEMENT, SKIN, SUB-Q TISSUE,=<20 SQ CM: ICD-10-PCS | Mod: 59,,, | Performed by: PODIATRIST

## 2022-03-11 PROCEDURE — 11043 PR DEBRIDEMENT, SKIN, SUB-Q TISSUE,MUSCLE,=<20 SQ CM: ICD-10-PCS | Mod: ,,, | Performed by: PODIATRIST

## 2022-03-11 PROCEDURE — 11042 DBRDMT SUBQ TIS 1ST 20SQCM/<: CPT | Mod: 59,,, | Performed by: PODIATRIST

## 2022-03-11 PROCEDURE — 11042 DBRDMT SUBQ TIS 1ST 20SQCM/<: CPT | Mod: 59 | Performed by: PODIATRIST

## 2022-03-11 PROCEDURE — 11043 DBRDMT MUSC&/FSCA 1ST 20/<: CPT | Mod: ,,, | Performed by: PODIATRIST

## 2022-03-11 PROCEDURE — 99214 PR OFFICE/OUTPT VISIT, EST, LEVL IV, 30-39 MIN: ICD-10-PCS | Mod: 25,,, | Performed by: PODIATRIST

## 2022-03-11 PROCEDURE — 97605 NEG PRS WND THER DME<=50SQCM: CPT | Performed by: PODIATRIST

## 2022-03-11 PROCEDURE — 11043 DBRDMT MUSC&/FSCA 1ST 20/<: CPT | Performed by: PODIATRIST

## 2022-03-11 NOTE — PROGRESS NOTES
[address] [facility] [address]  [place] [address]  Phone: [phone]   [phone]    Patient's PCP:Veena Stroud MD  Referring Provider: No ref. provider found    Subjective:      Chief Complaint:: Wound Care, Pressure Ulcer, Non-healing Wound, Wound Infection, and Diabetic Foot Ulcer (New wound left dorsal foot)    HPI   Presents for follow-up of bilateral foot ulcers.  Additionally patient presents with a new wound located on his left dorsal foot.  Patient states he is not sure how the wound occurred but likely due to pressure.  This wound was not present at last visit.  See Wound Docs for assessment of wounds and procedure notes    Patient states he has not been drinking protein drinks as instructed.  Counseled patient on the need to increase nutritional intake.      Bandar Tomas is a 71 y.o. male  with past medical history of hypertension, hyperlipidemia, uncontrolled diabetes, diabetic neuropathy who presents to clinic with a complaint of left great toe ulcer status post amputation of left great toe lasting for approximately 2 weeks. Onset of symptoms was initially patient had a severe infected wound on the left foot and was admitted to the hospital.  See discharge summary below.  He underwent a left great toe amputation with keeping the amputation site open due to not having enough viable skin for closure.  A wound VAC was started.  Referral to wound care was placed.     Upon presentation today, patient also has a open wound on the plantar right foot.  Patient states he does not check his feet so is unsure how long this has been present.  Patient's daughter is bedside and states patient lives alone and is not compliant with his diabetic medication, checking his feet, and additional recommendations by his doctors.  Counseled patient on the need to be compliant.  Counseled patient that if he is not compliant he is risking limb loss as well.     1. Debridement See Wound Docs for assessment of wounds  and procedure notes  2. Continue taking all medications as prescribed, ciprofloxacin   3. Continue home health dressing changes, wound VAC to left foot wound.  Iodosorb with football wrap to right foot wound, silver alginate followed by border foam to left foot dorsal new wound   4. RTC one week   5. Counseled patient on increasing protein intake, not getting wounds wet, keeping dressings clean dry and intact, following a healthy diet, elevating legs when able, removing pressure from wounds     Total time spent for E&M 30  Total time for debridement 35 minutes      Counseled patient regarding the need to get his blood sugars under control.  Discussed with patient that with blood sugars at this high level it is very difficult to heal wounds.        Discharge summary on 02/21/2022:  HPI:   Mr. Reyes is a 71-year-old male with a past medical history of hypertension, hyperlipidemia, insulin-dependent diabetes mellitus type 2, and obesity BMI 32 who presents today with complaints of a left toe infection.  It is severe.  It is associated with purulent drainage, malodor, and pain.  He denies fever, chills, nausea, vomiting, diarrhea, dizziness, chest pain, shortness of breath, loss of consciousness.  He has known injury to the toe.  He states he took off his today issues about 2 weeks ago and noticeable ulcerations status toe.  He went to the pharmacy and ask pharmacist for recommendations and was recommended triple antibiotic ointment and soaks for the toe which he did with no improvement.  He does not have a podiatrist.  He states his glucoses have been out of control over the last 2 weeks foot due to this infection.  He does not have an endocrinologist.  X-ray of foot in the ED revealed: Osteomyelitis of great toe proximal and distal phalanges. Pathologic fracture of great toe distal phalanx  Procedure(s) (LRB):  AMPUTATION, TOE (Left)    Hospital Course:   Patient admitted with Diabetic ulcer of Left Great toe and  associated acute Osteomyelitis  Pt underwent L great toe amputation  Later pt was discharged to home with HH /wound vac  Extensive education was provided regarding his Uncontrolled DM type 2            Systemic Doctor: Veena Stroud MD  Date Last Seen: about 4 months ago  Blood Sugar: doesn't check  Hemoglobin A1c: 16.4 (2/15/2022)        Patient should call the office immediately if any signs of infection, such as fever, chills, sweats, increased redness or pain.     Patient was instructed to call the clinic or go to the emergency department if their symptoms do not improve, worsens, or if new symptoms develop.  Patient was advised that if any increased swelling, pain, or numbness arise to go immediately to the ED. Patient knows to call any time if an emergency arises. Shared decision making occurred and patient verbalized understanding in agreement with this plan.         Counseling/Education:  I provided patient education verbally regarding:   The aspects of diabetes and how it pertains to the feet. I explained the importance of proper diabetic foot care and how it is essential for the health of their feet.     I discussed the importance of knowing their Hemoglobin A1c and that the level needs to be as close to 6 as possible. I discussed the increase complications of high blood sugar including stroke, blindness, heart attack, kidney failure and loss of limb secondary to neuropathy and PVD.      With neuropathy, beware of any breaks in the skin or redness. These areas are not recognized early due to the numbness.     I discussed Diabetes, lower back issues, metabolic disorders, systemic causes, chemotherapy, vitamin deficiency, heavy metal exposure, as some of the causes. I also explained that as much as 40% of the time we can not find a cause. I discussed different treatments available to control the symptoms but which may not cure the problem.         Counseled patient on the aspects of diabetes and how it  pertains to the feet.  I explained the importance of proper diabetic foot care and how it is essential for the health of their feet.        Shoe inspection. Patient instructed on proper foot hygeine. We discussed wearing proper shoe gear, daily foot inspections, never walking without protective shoe gear, never putting sharp instruments to feet, routine podiatric nail visits every 2-3 months.       I counseled the patient on their conditions, their implications and medical management.     >50% of this > 60 minute visit was spent face to face educating/counseling the patient     I spent a total of 60 minutes on the day of the visit.This includes face to face time and non-face to face time preparing to see the patient (eg, review of tests), obtaining and/or reviewing separately obtained history, documenting clinical information in the electronic or other health record, independently interpreting results and communicating results to the patient/family/caregiver, or care coordinator.        Much of the documentation for this visit was completed in the Wound Docs system.  Please see the attached documentation for further details about the patient's care. Scanned under the Media tab.         Kirsty Rodríguez DPM     Vitals:    03/11/22 0921   BP: (!) 143/72   Pulse: 88   Resp: 18   Temp: 98.4 °F (36.9 °C)   TempSrc: Skin   PainSc: 0-No pain      Shoe Size:     Past Surgical History:   Procedure Laterality Date    COLONOSCOPY  prior to 2007    COLONOSCOPY N/A 3/31/2016    Procedure: COLONOSCOPY;  Surgeon: Perry Rodriguez MD;  Location: [facility];  Service: Endoscopy;  Laterality: N/A;    SHOULDER SURGERY Left     TOE AMPUTATION Left 2/17/2022    Procedure: AMPUTATION, TOE;  Surgeon: Jesse Rodríguez DPM;  Location: [facility];  Service: Podiatry;  Laterality: Left;    UPPER GASTROINTESTINAL ENDOSCOPY       Past Medical History:   Diagnosis Date    Diabetes     Former smoker     HTN (hypertension)      Family History    Problem Relation Age of Onset    Brain cancer Brother 54    Esophageal cancer Paternal Grandfather 87    Colon polyps Neg Hx         Social History:   Marital Status:   Alcohol History:  reports current alcohol use.  Tobacco History:  reports that he has quit smoking. He does not have any smokeless tobacco history on file.  Drug History:  reports no history of drug use.    Review of patient's allergies indicates:  No Known Allergies    Current Outpatient Medications   Medication Sig Dispense Refill    aspirin 81 MG Chew Take 81 mg by mouth once daily.      ciprofloxacin HCl (CIPRO) 500 MG tablet Take 1 tablet (500 mg total) by mouth 2 (two) times daily. 60 tablet 0    collagenase (SANTYL) ointment Apply topically once daily. 30 g 0    hydroCHLOROthiazide (HYDRODIURIL) 25 MG tablet Take 25 mg by mouth every morning.      HYDROcodone-acetaminophen (NORCO) 5-325 mg per tablet Take 1 tablet by mouth every 6 (six) hours as needed for Pain. 30 tablet 0    insulin  unit/mL injection Inject 25 Units into the skin 2 (two) times daily before meals.      insulin regular 100 unit/mL Inj injection Inject 25 Units into the skin 2 (two) times daily before meals.      losartan (COZAAR) 100 MG tablet Take 100 mg by mouth once daily.      pravastatin (PRAVACHOL) 40 MG tablet Take 40 mg by mouth once daily.      spironolactone (ALDACTONE) 50 MG tablet Take 50 mg by mouth once daily.       No current facility-administered medications for this visit.       Review of Systems   Constitutional: Negative for chills, fatigue, fever and unexpected weight change.   HENT: Negative for hearing loss and trouble swallowing.    Eyes: Negative for photophobia and visual disturbance.   Respiratory: Negative for cough, shortness of breath and wheezing.    Cardiovascular: Negative for chest pain, palpitations and leg swelling.   Gastrointestinal: Negative for abdominal pain and nausea.   Genitourinary: Negative for dysuria  and frequency.   Musculoskeletal: Negative for arthralgias, back pain, gait problem, joint swelling and myalgias.   Skin: Positive for wound. Negative for rash.   Neurological: Negative for tremors, seizures, weakness, numbness and headaches.   Hematological: Does not bruise/bleed easily.         Objective:        Physical Exam:   Foot Exam  Physical Exam    Imaging:            Assessment:       1. Ulcer of left foot with fat layer exposed    2. 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 Ulcer of right foot with necrosis of muscle    4. Uncontrolled type 2 diabetes mellitus with hyperglycemia    5. Obesity (BMI 30.0-34.9)    6. At high risk for inadequate nutritional intake    7. Diabetic foot infection    8. History of amputation of left foot    9. Non compliance with medical treatment    10. Decreased pedal pulses      Plan:   Ulcer of left foot with fat layer exposed    Ulcer of left foot with necrosis of bone    Ulcer of right foot with necrosis of muscle    Uncontrolled type 2 diabetes mellitus with hyperglycemia    Obesity (BMI 30.0-34.9)    At high risk for inadequate nutritional intake    Diabetic foot infection    History of amputation of left foot    Non compliance with medical treatment    Decreased pedal pulses      Follow up in about 1 week (around 3/18/2022).    Procedures          Counseling:     I provided patient education verbally regarding:   Patient diagnosis, treatment options, as well as alternatives, risks, and benefits.     This note was created using Dragon voice recognition software that occasionally misinterpreted phrases or words.

## 2022-03-18 ENCOUNTER — OFFICE VISIT (OUTPATIENT)
Dept: WOUND CARE | Facility: HOSPITAL | Age: 72
End: 2022-03-18
Attending: PODIATRIST
Payer: MEDICARE

## 2022-03-18 VITALS
HEART RATE: 92 BPM | SYSTOLIC BLOOD PRESSURE: 169 MMHG | TEMPERATURE: 98 F | RESPIRATION RATE: 18 BRPM | DIASTOLIC BLOOD PRESSURE: 93 MMHG

## 2022-03-18 DIAGNOSIS — L97.513 ULCER OF RIGHT FOOT WITH NECROSIS OF MUSCLE: ICD-10-CM

## 2022-03-18 DIAGNOSIS — L97.522 ULCER OF LEFT FOOT WITH FAT LAYER EXPOSED: ICD-10-CM

## 2022-03-18 DIAGNOSIS — E11.628 DIABETIC FOOT INFECTION: ICD-10-CM

## 2022-03-18 DIAGNOSIS — R09.89 DECREASED PEDAL PULSES: ICD-10-CM

## 2022-03-18 DIAGNOSIS — L08.9 DIABETIC FOOT INFECTION: ICD-10-CM

## 2022-03-18 DIAGNOSIS — Z91.199 NON COMPLIANCE WITH MEDICAL TREATMENT: ICD-10-CM

## 2022-03-18 DIAGNOSIS — E66.9 OBESITY (BMI 30.0-34.9): ICD-10-CM

## 2022-03-18 DIAGNOSIS — E11.65 UNCONTROLLED TYPE 2 DIABETES MELLITUS WITH HYPERGLYCEMIA: ICD-10-CM

## 2022-03-18 DIAGNOSIS — L97.524 ULCER OF LEFT FOOT WITH NECROSIS OF BONE: Primary | ICD-10-CM

## 2022-03-18 DIAGNOSIS — E11.9 COMPREHENSIVE DIABETIC FOOT EXAMINATION, TYPE 2 DM, ENCOUNTER FOR: ICD-10-CM

## 2022-03-18 DIAGNOSIS — Z91.89 AT HIGH RISK FOR INADEQUATE NUTRITIONAL INTAKE: ICD-10-CM

## 2022-03-18 DIAGNOSIS — Z89.432 HISTORY OF AMPUTATION OF LEFT FOOT: ICD-10-CM

## 2022-03-18 PROCEDURE — 99214 PR OFFICE/OUTPT VISIT, EST, LEVL IV, 30-39 MIN: ICD-10-PCS | Mod: 25,,, | Performed by: PODIATRIST

## 2022-03-18 PROCEDURE — 11044 PR DEBRIDEMENT, SKIN, SUB-Q TISSUE,MUSCLE,BONE,=<20 SQ CM: ICD-10-PCS | Mod: ,,, | Performed by: PODIATRIST

## 2022-03-18 PROCEDURE — 87075 CULTR BACTERIA EXCEPT BLOOD: CPT | Performed by: PODIATRIST

## 2022-03-18 PROCEDURE — 87176 TISSUE HOMOGENIZATION CULTR: CPT | Performed by: PODIATRIST

## 2022-03-18 PROCEDURE — 11042 PR DEBRIDEMENT, SKIN, SUB-Q TISSUE,=<20 SQ CM: ICD-10-PCS | Mod: 59,,, | Performed by: PODIATRIST

## 2022-03-18 PROCEDURE — 11044 DBRDMT BONE 1ST 20 SQ CM/<: CPT | Mod: ,,, | Performed by: PODIATRIST

## 2022-03-18 PROCEDURE — 11043 PR DEBRIDEMENT, SKIN, SUB-Q TISSUE,MUSCLE,=<20 SQ CM: ICD-10-PCS | Mod: 59,,, | Performed by: PODIATRIST

## 2022-03-18 PROCEDURE — 87186 SC STD MICRODIL/AGAR DIL: CPT | Performed by: PODIATRIST

## 2022-03-18 PROCEDURE — 87077 CULTURE AEROBIC IDENTIFY: CPT | Performed by: PODIATRIST

## 2022-03-18 PROCEDURE — 11044 DBRDMT BONE 1ST 20 SQ CM/<: CPT | Performed by: PODIATRIST

## 2022-03-18 PROCEDURE — 87070 CULTURE OTHR SPECIMN AEROBIC: CPT | Performed by: PODIATRIST

## 2022-03-18 PROCEDURE — 11042 DBRDMT SUBQ TIS 1ST 20SQCM/<: CPT | Mod: 59,,, | Performed by: PODIATRIST

## 2022-03-18 PROCEDURE — 11042 DBRDMT SUBQ TIS 1ST 20SQCM/<: CPT | Mod: 59 | Performed by: PODIATRIST

## 2022-03-18 PROCEDURE — 11043 DBRDMT MUSC&/FSCA 1ST 20/<: CPT | Mod: 59 | Performed by: PODIATRIST

## 2022-03-18 PROCEDURE — 99214 OFFICE O/P EST MOD 30 MIN: CPT | Mod: 25,,, | Performed by: PODIATRIST

## 2022-03-18 PROCEDURE — 11043 DBRDMT MUSC&/FSCA 1ST 20/<: CPT | Mod: 59,,, | Performed by: PODIATRIST

## 2022-03-18 NOTE — PROGRESS NOTES
[address] [facility] [address]  [place] [address]  Phone: [phone]   [phone]    Patient's PCP:Veena Stroud MD  Referring Provider: No ref. provider found    Subjective:      Chief Complaint:: Wound Care, Pressure Ulcer, Non-healing Wound, Wound Infection, Diabetes, Diabetic Foot Ulcer, and Diabetic Foot Exam    HPI  Presents for follow-up of bilateral foot ulcers.  Left metatarsal head is still exposed.  Culture of the bone was taken today following debridement.  Infectious Disease referral placed.    Additionally, patient presents today for diabetic foot exam.    Patient states he has not been drinking protein drinks as instructed. Counseled patient on the need to increase nutritional intake.      1. Debridement See Wound Docs for assessment of wounds and procedure notes  2. Continue taking all medications as prescribed, ciprofloxacin   3. Continue home health dressing changes, wound VAC to left foot wounds.  Iodosorb with football wrap to right foot wound   4. RTC one week   5. Counseled patient on increasing protein intake, not getting wounds wet, keeping dressings clean dry and intact, following a healthy diet, elevating legs when able, removing pressure from wounds     Total time spent for E&M 30  Total time for debridement 35 minutes      Bandar Tomas is a 71 y.o. male  with past medical history of hypertension, hyperlipidemia, uncontrolled diabetes, diabetic neuropathy who presents to clinic with a complaint of left great toe ulcer status post amputation of left great toe lasting for approximately 2 weeks. Onset of symptoms was initially patient had a severe infected wound on the left foot and was admitted to the hospital.  See discharge summary below.  He underwent a left great toe amputation with keeping the amputation site open due to not having enough viable skin for closure.  A wound VAC was started.  Referral to wound care was placed.     Upon presentation today, patient also has a  open wound on the plantar right foot.  Patient states he does not check his feet so is unsure how long this has been present.  Patient's daughter is bedside and states patient lives alone and is not compliant with his diabetic medication, checking his feet, and additional recommendations by his doctors.  Counseled patient on the need to be compliant.  Counseled patient that if he is not compliant he is risking limb loss as well.      Counseled patient regarding the need to get his blood sugars under control.  Discussed with patient that with blood sugars at this high level it is very difficult to heal wounds.        Discharge summary on 02/21/2022:  HPI:   Mr. Reyes is a 71-year-old male with a past medical history of hypertension, hyperlipidemia, insulin-dependent diabetes mellitus type 2, and obesity BMI 32 who presents today with complaints of a left toe infection.  It is severe.  It is associated with purulent drainage, malodor, and pain.  He denies fever, chills, nausea, vomiting, diarrhea, dizziness, chest pain, shortness of breath, loss of consciousness.  He has known injury to the toe.  He states he took off his today issues about 2 weeks ago and noticeable ulcerations status toe.  He went to the pharmacy and ask pharmacist for recommendations and was recommended triple antibiotic ointment and soaks for the toe which he did with no improvement.  He does not have a podiatrist.  He states his glucoses have been out of control over the last 2 weeks foot due to this infection.  He does not have an endocrinologist.  X-ray of foot in the ED revealed: Osteomyelitis of great toe proximal and distal phalanges. Pathologic fracture of great toe distal phalanx  Procedure(s) (LRB):  AMPUTATION, TOE (Left)    Hospital Course:   Patient admitted with Diabetic ulcer of Left Great toe and associated acute Osteomyelitis  Pt underwent L great toe amputation  Later pt was discharged to home with HH /wound vac  Extensive  education was provided regarding his Uncontrolled DM type 2            Systemic Doctor: Veena Stroud MD  Date Last Seen: about 4 months ago  Blood Sugar: doesn't check  Hemoglobin A1c: 16.4 (2/15/2022)        Patient should call the office immediately if any signs of infection, such as fever, chills, sweats, increased redness or pain.     Patient was instructed to call the clinic or go to the emergency department if their symptoms do not improve, worsens, or if new symptoms develop.  Patient was advised that if any increased swelling, pain, or numbness arise to go immediately to the ED. Patient knows to call any time if an emergency arises. Shared decision making occurred and patient verbalized understanding in agreement with this plan.         Counseling/Education:  I provided patient education verbally regarding:   The aspects of diabetes and how it pertains to the feet. I explained the importance of proper diabetic foot care and how it is essential for the health of their feet.     I discussed the importance of knowing their Hemoglobin A1c and that the level needs to be as close to 6 as possible. I discussed the increase complications of high blood sugar including stroke, blindness, heart attack, kidney failure and loss of limb secondary to neuropathy and PVD.      With neuropathy, beware of any breaks in the skin or redness. These areas are not recognized early due to the numbness.     I discussed Diabetes, lower back issues, metabolic disorders, systemic causes, chemotherapy, vitamin deficiency, heavy metal exposure, as some of the causes. I also explained that as much as 40% of the time we can not find a cause. I discussed different treatments available to control the symptoms but which may not cure the problem.         Counseled patient on the aspects of diabetes and how it pertains to the feet.  I explained the importance of proper diabetic foot care and how it is essential for the health of their  feet.        Shoe inspection. Patient instructed on proper foot hygeine. We discussed wearing proper shoe gear, daily foot inspections, never walking without protective shoe gear, never putting sharp instruments to feet, routine podiatric nail visits every 2-3 months.       I counseled the patient on their conditions, their implications and medical management.     >50% of this > 60 minute visit was spent face to face educating/counseling the patient     I spent a total of 60 minutes on the day of the visit.This includes face to face time and non-face to face time preparing to see the patient (eg, review of tests), obtaining and/or reviewing separately obtained history, documenting clinical information in the electronic or other health record, independently interpreting results and communicating results to the patient/family/caregiver, or care coordinator.        Much of the documentation for this visit was completed in the Wound Docs system.  Please see the attached documentation for further details about the patient's care. Scanned under the Media tab.         Kirsty Rodríguez DPM        Vitals:    03/18/22 0940   BP: (!) 169/93   Pulse: 92   Resp: 18   Temp: 98.3 °F (36.8 °C)   TempSrc: Skin   PainSc: 0-No pain      Shoe Size:     Past Surgical History:   Procedure Laterality Date    COLONOSCOPY  prior to 2007    COLONOSCOPY N/A 3/31/2016    Procedure: COLONOSCOPY;  Surgeon: Perry Rodriguez MD;  Location[facility];  Service: Endoscopy;  Laterality: N/A;    SHOULDER SURGERY Left     TOE AMPUTATION Left 2/17/2022    Procedure: AMPUTATION, TOE;  Surgeon: Jesse Rodríguez DPM;  Location: [facility];  Service: Podiatry;  Laterality: Left;    UPPER GASTROINTESTINAL ENDOSCOPY       Past Medical History:   Diagnosis Date    Diabetes     Former smoker     HTN (hypertension)      Family History   Problem Relation Age of Onset    Brain cancer Brother 54    Esophageal cancer Paternal Grandfather 87    Colon polyps Neg  Hx         Social History:   Marital Status:   Alcohol History:  reports current alcohol use.  Tobacco History:  reports that he has quit smoking. He does not have any smokeless tobacco history on file.  Drug History:  reports no history of drug use.    Review of patient's allergies indicates:  No Known Allergies    Current Outpatient Medications   Medication Sig Dispense Refill    aspirin 81 MG Chew Take 81 mg by mouth once daily.      ciprofloxacin HCl (CIPRO) 500 MG tablet Take 1 tablet (500 mg total) by mouth 2 (two) times daily. 60 tablet 0    collagenase (SANTYL) ointment Apply topically once daily. 30 g 0    hydroCHLOROthiazide (HYDRODIURIL) 25 MG tablet Take 25 mg by mouth every morning.      HYDROcodone-acetaminophen (NORCO) 5-325 mg per tablet Take 1 tablet by mouth every 6 (six) hours as needed for Pain. 30 tablet 0    insulin  unit/mL injection Inject 25 Units into the skin 2 (two) times daily before meals.      insulin regular 100 unit/mL Inj injection Inject 25 Units into the skin 2 (two) times daily before meals.      losartan (COZAAR) 100 MG tablet Take 100 mg by mouth once daily.      pravastatin (PRAVACHOL) 40 MG tablet Take 40 mg by mouth once daily.      spironolactone (ALDACTONE) 50 MG tablet Take 50 mg by mouth once daily.       No current facility-administered medications for this visit.       Review of Systems   Constitutional: Negative for chills, fatigue, fever and unexpected weight change.   HENT: Negative for hearing loss and trouble swallowing.    Eyes: Negative for photophobia and visual disturbance.   Respiratory: Negative for cough, shortness of breath and wheezing.    Cardiovascular: Negative for chest pain, palpitations and leg swelling.   Gastrointestinal: Negative for abdominal pain and nausea.   Genitourinary: Negative for dysuria and frequency.   Musculoskeletal: Negative for arthralgias, back pain, gait problem, joint swelling and myalgias.   Skin:  Positive for wound. Negative for rash.   Neurological: Negative for tremors, seizures, weakness, numbness and headaches.   Hematological: Does not bruise/bleed easily.         Objective:        Physical Exam:   Foot Exam  Physical Exam     Please see WoundDocs documentation for full assessment of wounds    Physical examination: General: Pt. is well-developed, well-nourished, appears stated age, in no acute distress, alert and oriented x 3.    Vascular: Dorsalis pedis and posterior tibial pulses are 1/4 Bilaterally. Toes are warm to touch. Feet are warm proximally.    There is decreased digital hair. Skin is atrophic, slightly hyperpigmented, and mildly edematous. Capillary refill less than 5 seconds all toes/distal feet    Neurologic: [place]-Savi 5.07 monofilament is decreased bilateral feet. Sharp/dull sensation absent Bilateral feet.    Vibratory sensation absent bilateral    Musculoskeletal: adequate joint range of motion without pain, limitation, nor crepitation Bilateral feet and ankle joints. Muscle strength is 5/5 in all groups bilaterally.    Lymphatics: no lymphangitic streaking bilaterally.    Dermatologic: Elongated, thickened, dystrophic, discolored nails x 10. Xerosis Bilaterally.      Patient instructed on proper foot hygeine. We discussed wearing proper shoe gear, daily foot inspections, never walking without protective shoe gear, never putting sharp instruments to feet, routine podiatric nail visits every 2-3 months. Nails were aggressively reduced and debrided x 10 mechanically and with electric  down to the nailbed in order to thin the nail plates. Pt. tolerated well and related comfort. Pt. to RTC in 2-3 months for follow-up. Pt. to wear extra-depth shoes and custom     Imaging:            Assessment:       1. Ulcer of left foot with necrosis of bone    2. Ulcer of right foot with necrosis of muscle    3. Ulcer of left foot with fat layer exposed    4. Uncontrolled type 2 diabetes  mellitus with hyperglycemia    5. Obesity (BMI 30.0-34.9)    6. At high risk for inadequate nutritional intake    7. Diabetic foot infection    8. Decreased pedal pulses    9. Non compliance with medical treatment    10. History of amputation of left foot    11. Comprehensive diabetic foot examination, type 2 DM, encounter for      Plan:   Ulcer of left foot with necrosis of bone  -     Anaerobic culture  -     Aerobic culture  -     Ambulatory referral/consult to Infectious Disease; Future; Expected date: 03/25/2022    Ulcer of right foot with necrosis of muscle    Ulcer of left foot with fat layer exposed    Uncontrolled type 2 diabetes mellitus with hyperglycemia  -      DIABETES FOOT EXAM    Obesity (BMI 30.0-34.9)    At high risk for inadequate nutritional intake    Diabetic foot infection    Decreased pedal pulses    Non compliance with medical treatment    History of amputation of left foot    Comprehensive diabetic foot examination, type 2 DM, encounter for  -      DIABETES FOOT EXAM      Follow up in about 1 week (around 3/25/2022).    Procedures          Counseling:     I provided patient education verbally regarding:   Patient diagnosis, treatment options, as well as alternatives, risks, and benefits.     This note was created using Dragon voice recognition software that occasionally misinterpreted phrases or words.

## 2022-03-23 LAB
BACTERIA SPEC AEROBE CULT: ABNORMAL
BACTERIA SPEC AEROBE CULT: ABNORMAL
BACTERIA SPEC ANAEROBE CULT: NORMAL

## 2022-03-25 ENCOUNTER — OFFICE VISIT (OUTPATIENT)
Dept: WOUND CARE | Facility: HOSPITAL | Age: 72
End: 2022-03-25
Attending: PODIATRIST
Payer: MEDICARE

## 2022-03-25 VITALS
TEMPERATURE: 98 F | DIASTOLIC BLOOD PRESSURE: 77 MMHG | HEART RATE: 72 BPM | SYSTOLIC BLOOD PRESSURE: 144 MMHG | RESPIRATION RATE: 17 BRPM

## 2022-03-25 DIAGNOSIS — E11.65 UNCONTROLLED TYPE 2 DIABETES MELLITUS WITH HYPERGLYCEMIA: ICD-10-CM

## 2022-03-25 DIAGNOSIS — L97.524 ULCER OF LEFT FOOT WITH NECROSIS OF BONE: Primary | ICD-10-CM

## 2022-03-25 DIAGNOSIS — L97.522 ULCER OF LEFT FOOT WITH FAT LAYER EXPOSED: ICD-10-CM

## 2022-03-25 DIAGNOSIS — Z91.199 NON COMPLIANCE WITH MEDICAL TREATMENT: ICD-10-CM

## 2022-03-25 DIAGNOSIS — R23.4 FISSURE IN SKIN OF BOTH FEET: ICD-10-CM

## 2022-03-25 DIAGNOSIS — Z89.432 HISTORY OF AMPUTATION OF LEFT FOOT: ICD-10-CM

## 2022-03-25 DIAGNOSIS — M86.10 ACUTE OSTEOMYELITIS: ICD-10-CM

## 2022-03-25 DIAGNOSIS — B35.3 TINEA PEDIS OF BOTH FEET: ICD-10-CM

## 2022-03-25 DIAGNOSIS — E66.9 OBESITY (BMI 30.0-34.9): ICD-10-CM

## 2022-03-25 DIAGNOSIS — E11.9 COMPREHENSIVE DIABETIC FOOT EXAMINATION, TYPE 2 DM, ENCOUNTER FOR: ICD-10-CM

## 2022-03-25 DIAGNOSIS — Z91.89 AT HIGH RISK FOR INADEQUATE NUTRITIONAL INTAKE: ICD-10-CM

## 2022-03-25 DIAGNOSIS — R09.89 DECREASED PEDAL PULSES: ICD-10-CM

## 2022-03-25 DIAGNOSIS — L97.513 ULCER OF RIGHT FOOT WITH NECROSIS OF MUSCLE: ICD-10-CM

## 2022-03-25 DIAGNOSIS — E11.628 DIABETIC FOOT INFECTION: ICD-10-CM

## 2022-03-25 DIAGNOSIS — L08.9 DIABETIC FOOT INFECTION: ICD-10-CM

## 2022-03-25 PROCEDURE — 11042 DBRDMT SUBQ TIS 1ST 20SQCM/<: CPT | Mod: 59,,, | Performed by: PODIATRIST

## 2022-03-25 PROCEDURE — 11043 PR DEBRIDEMENT, SKIN, SUB-Q TISSUE,MUSCLE,=<20 SQ CM: ICD-10-PCS | Mod: ,,, | Performed by: PODIATRIST

## 2022-03-25 PROCEDURE — 11043 DBRDMT MUSC&/FSCA 1ST 20/<: CPT | Mod: ,,, | Performed by: PODIATRIST

## 2022-03-25 PROCEDURE — 99214 PR OFFICE/OUTPT VISIT, EST, LEVL IV, 30-39 MIN: ICD-10-PCS | Mod: 25,,, | Performed by: PODIATRIST

## 2022-03-25 PROCEDURE — 11042 PR DEBRIDEMENT, SKIN, SUB-Q TISSUE,=<20 SQ CM: ICD-10-PCS | Mod: 59,,, | Performed by: PODIATRIST

## 2022-03-25 PROCEDURE — 11042 DBRDMT SUBQ TIS 1ST 20SQCM/<: CPT | Mod: 59 | Performed by: PODIATRIST

## 2022-03-25 PROCEDURE — 99214 OFFICE O/P EST MOD 30 MIN: CPT | Mod: 25,,, | Performed by: PODIATRIST

## 2022-03-25 PROCEDURE — 11043 DBRDMT MUSC&/FSCA 1ST 20/<: CPT | Performed by: PODIATRIST

## 2022-03-25 RX ORDER — DOXYCYCLINE 100 MG/1
100 CAPSULE ORAL 2 TIMES DAILY
Qty: 28 CAPSULE | Refills: 0 | Status: SHIPPED | OUTPATIENT
Start: 2022-03-25 | End: 2022-03-31 | Stop reason: SDUPTHER

## 2022-03-25 RX ORDER — DOXYCYCLINE 100 MG/1
100 CAPSULE ORAL 2 TIMES DAILY
Qty: 28 CAPSULE | Refills: 0 | Status: SHIPPED | OUTPATIENT
Start: 2022-03-25 | End: 2022-03-25

## 2022-03-25 NOTE — PROGRESS NOTES
[address]  [place] [address]  Phone: [phone]   [phone]    Patient's PCP:Veena Stroud MD  Referring Provider: No ref. provider found    Subjective:      Chief Complaint:: Wound Care, Wound Infection, Diabetes, Diabetic Foot Ulcer, Non-healing Wound, Pressure Ulcer, and Tinea Pedis    HPI   Patient presents today stating that he did not go to his Infectious Disease appointment due to the weather.  I instructed him to contact the infectious disease clinic in order to reschedule as his bone culture did return growing bacteria.  I will prescribe him antibiotics orally, based on culture results, to take until he is able to get back in with infectious disease    Additionally, patient presents today with complaints of dry skin and heel fissuring bilateral feet.  He has not done anything to treat it.         1. Debridement See Wound Docs for assessment of wounds and procedure notes  2. Continue taking all medications as prescribed, ciprofloxacin   3. Continue home health dressing changes, wound VAC to left foot wounds.  Silver alginate with football wrap to right foot wound   4. RTC one week   5. Counseled patient on increasing protein intake, not getting wounds wet, keeping dressings clean dry and intact, following a healthy diet, elevating legs when able, removing pressure from wounds     Total time spent for E&M 30  Total time for debridement 35 minutes     Athlete's foot counseling: Counseled patient that it is important to keep the feet dry. Use absorbent cotton socks and change them if they become sweaty. Or wear an open-toe shoe or sandal. Wash the feet at least once a day with soap and water. Apply the antifungal cream as prescribed. Recommend spray antiperspirant to the feet. Some antifungal creams are available without a prescription. It may take a week before the rash starts to improve. It can take about 3 to 4 weeks to completely clear. Continue the medicine until the rash  is all gone. Use over-the-counter antifungal powders or sprays on your feet after exposure to high-risk environments, such as public showers, gyms, and locker rooms. This can help prevent future infections. Wearing appropriate shoes in these situations can help.      Fungal infection of skin explained. Treatment options including no treatment, topical medications, oral medications were discussed, as well as success rates and risks of recurrence. We agreed on topical medication        Bandar Tomas is a 71 y.o. male  with past medical history of hypertension, hyperlipidemia, uncontrolled diabetes, diabetic neuropathy who presents to clinic with a complaint of left great toe ulcer status post amputation of left great toe lasting for approximately 2 weeks. Onset of symptoms was initially patient had a severe infected wound on the left foot and was admitted to the hospital.  See discharge summary below.  He underwent a left great toe amputation with keeping the amputation site open due to not having enough viable skin for closure.  A wound VAC was started.  Referral to wound care was placed.     Upon presentation today, patient also has a open wound on the plantar right foot.  Patient states he does not check his feet so is unsure how long this has been present.  Patient's daughter is bedside and states patient lives alone and is not compliant with his diabetic medication, checking his feet, and additional recommendations by his doctors.  Counseled patient on the need to be compliant.  Counseled patient that if he is not compliant he is risking limb loss as well.        Counseled patient regarding the need to get his blood sugars under control.  Discussed with patient that with blood sugars at this high level it is very difficult to heal wounds.        Discharge summary on 02/21/2022:  HPI:   Mr. Reyes is a 71-year-old male with a past medical history of hypertension, hyperlipidemia, insulin-dependent diabetes mellitus  type 2, and obesity BMI 32 who presents today with complaints of a left toe infection.  It is severe.  It is associated with purulent drainage, malodor, and pain.  He denies fever, chills, nausea, vomiting, diarrhea, dizziness, chest pain, shortness of breath, loss of consciousness.  He has known injury to the toe.  He states he took off his today issues about 2 weeks ago and noticeable ulcerations status toe.  He went to the pharmacy and ask pharmacist for recommendations and was recommended triple antibiotic ointment and soaks for the toe which he did with no improvement.  He does not have a podiatrist.  He states his glucoses have been out of control over the last 2 weeks foot due to this infection.  He does not have an endocrinologist.  X-ray of foot in the ED revealed: Osteomyelitis of great toe proximal and distal phalanges. Pathologic fracture of great toe distal phalanx  Procedure(s) (LRB):  AMPUTATION, TOE (Left)    Hospital Course:   Patient admitted with Diabetic ulcer of Left Great toe and associated acute Osteomyelitis  Pt underwent L great toe amputation  Later pt was discharged to home with HH /wound vac  Extensive education was provided regarding his Uncontrolled DM type 2            Systemic Doctor: Veena Stroud MD  Date Last Seen: about 4 months ago  Blood Sugar: doesn't check  Hemoglobin A1c: 16.4 (2/15/2022)        Patient should call the office immediately if any signs of infection, such as fever, chills, sweats, increased redness or pain.     Patient was instructed to call the clinic or go to the emergency department if their symptoms do not improve, worsens, or if new symptoms develop.  Patient was advised that if any increased swelling, pain, or numbness arise to go immediately to the ED. Patient knows to call any time if an emergency arises. Shared decision making occurred and patient verbalized understanding in agreement with this plan.         Counseling/Education:  I provided patient  education verbally regarding:   The aspects of diabetes and how it pertains to the feet. I explained the importance of proper diabetic foot care and how it is essential for the health of their feet.     I discussed the importance of knowing their Hemoglobin A1c and that the level needs to be as close to 6 as possible. I discussed the increase complications of high blood sugar including stroke, blindness, heart attack, kidney failure and loss of limb secondary to neuropathy and PVD.      With neuropathy, beware of any breaks in the skin or redness. These areas are not recognized early due to the numbness.     I discussed Diabetes, lower back issues, metabolic disorders, systemic causes, chemotherapy, vitamin deficiency, heavy metal exposure, as some of the causes. I also explained that as much as 40% of the time we can not find a cause. I discussed different treatments available to control the symptoms but which may not cure the problem.         Counseled patient on the aspects of diabetes and how it pertains to the feet.  I explained the importance of proper diabetic foot care and how it is essential for the health of their feet.        Shoe inspection. Patient instructed on proper foot hygeine. We discussed wearing proper shoe gear, daily foot inspections, never walking without protective shoe gear, never putting sharp instruments to feet, routine podiatric nail visits every 2-3 months.       I counseled the patient on their conditions, their implications and medical management.     >50% of this > 60 minute visit was spent face to face educating/counseling the patient     I spent a total of 60 minutes on the day of the visit.This includes face to face time and non-face to face time preparing to see the patient (eg, review of tests), obtaining and/or reviewing separately obtained history, documenting clinical information in the electronic or other health record, independently interpreting results and communicating  results to the patient/family/caregiver, or care coordinator.        Much of the documentation for this visit was completed in the Wound Docs system.  Please see the attached documentation for further details about the patient's care. Scanned under the Media tab.         Kirsty Rodríguez DPM   Vitals:    03/25/22 1121   BP: (!) 144/77   Pulse: 72   Resp: 17   Temp: 98.3 °F (36.8 °C)   PainSc: 0-No pain      Shoe Size:     Past Surgical History:   Procedure Laterality Date    COLONOSCOPY  prior to 2007    COLONOSCOPY N/A 3/31/2016    Procedure: COLONOSCOPY;  Surgeon: Perry Rodriguez MD;  Location: [facility];  Service: Endoscopy;  Laterality: N/A;    SHOULDER SURGERY Left     TOE AMPUTATION Left 2/17/2022    Procedure: AMPUTATION, TOE;  Surgeon: Jesse Rodríguez DPM;  Location: [facility] OR;  Service: Podiatry;  Laterality: Left;    UPPER GASTROINTESTINAL ENDOSCOPY       Past Medical History:   Diagnosis Date    Diabetes     Former smoker     HTN (hypertension)      Family History   Problem Relation Age of Onset    Brain cancer Brother 54    Esophageal cancer Paternal Grandfather 87    Colon polyps Neg Hx         Social History:   Marital Status:   Alcohol History:  reports current alcohol use.  Tobacco History:  reports that he has quit smoking. He does not have any smokeless tobacco history on file.  Drug History:  reports no history of drug use.    Review of patient's allergies indicates:  No Known Allergies    Current Outpatient Medications   Medication Sig Dispense Refill    aspirin 81 MG Chew Take 81 mg by mouth once daily.      doxycycline (MONODOX) 100 MG capsule Take 1 capsule (100 mg total) by mouth 2 (two) times daily. for 14 days 28 capsule 0    hydroCHLOROthiazide (HYDRODIURIL) 25 MG tablet Take 25 mg by mouth every morning.      HYDROcodone-acetaminophen (NORCO) 5-325 mg per tablet Take 1 tablet by mouth every 6 (six) hours as needed for Pain. 30 tablet 0    insulin  unit/mL  injection Inject 25 Units into the skin 2 (two) times daily before meals.      insulin regular 100 unit/mL Inj injection Inject 25 Units into the skin 2 (two) times daily before meals.      losartan (COZAAR) 100 MG tablet Take 100 mg by mouth once daily.      pravastatin (PRAVACHOL) 40 MG tablet Take 40 mg by mouth once daily.      spironolactone (ALDACTONE) 50 MG tablet Take 50 mg by mouth once daily.       No current facility-administered medications for this visit.       Review of Systems   Constitutional: Negative for chills, fatigue, fever and unexpected weight change.   HENT: Negative for hearing loss and trouble swallowing.    Eyes: Negative for photophobia and visual disturbance.   Respiratory: Negative for cough, shortness of breath and wheezing.    Cardiovascular: Negative for chest pain, palpitations and leg swelling.   Gastrointestinal: Negative for abdominal pain and nausea.   Genitourinary: Negative for dysuria and frequency.   Musculoskeletal: Negative for arthralgias, back pain, gait problem, joint swelling and myalgias.   Skin: Positive for wound. Negative for rash.   Neurological: Negative for tremors, seizures, weakness, numbness and headaches.   Hematological: Does not bruise/bleed easily.         Objective:        Physical Exam:   Foot Exam  Physical Exam  See Wound Docs for assessment of wounds and procedure notes    Dry scale with superficial flakes without ulceration, drainage, pus, tracking, fluctuance, malodor, or cardinal signs infection.    Toenails 1st, 2nd, 3rd, 4th, 5th  bilateral are hypertrophic, dystrophic, discolored tanish brown with tan, gray crumbly subungual debris.  Tender to distal nail plate pressure, without periungual skin abnormality of each.    Fissures of skin on heels noted bilaterally. Without ulceration, drainage, pus, tracking, fluctuance, malodor, or cardinal signs infection.    Imaging:            Assessment:       1. Ulcer of left foot with necrosis of bone     2. Ulcer of right foot with necrosis of muscle    3. Ulcer of left foot with fat layer exposed    4. Uncontrolled type 2 diabetes mellitus with hyperglycemia    5. Obesity (BMI 30.0-34.9)    6. At high risk for inadequate nutritional intake    7. Comprehensive diabetic foot examination, type 2 DM, encounter for    8. Non compliance with medical treatment    9. History of amputation of left foot    10. Decreased pedal pulses    11. Diabetic foot infection    12. Acute osteomyelitis    13. Fissure in skin of both feet    14. Tinea pedis of both feet      Plan:   Ulcer of left foot with necrosis of bone    Ulcer of right foot with necrosis of muscle    Ulcer of left foot with fat layer exposed    Uncontrolled type 2 diabetes mellitus with hyperglycemia    Obesity (BMI 30.0-34.9)    At high risk for inadequate nutritional intake    Comprehensive diabetic foot examination, type 2 DM, encounter for    Non compliance with medical treatment    History of amputation of left foot    Decreased pedal pulses    Diabetic foot infection    Acute osteomyelitis    Fissure in skin of both feet    Tinea pedis of both feet    Other orders  -     Discontinue: doxycycline (MONODOX) 100 MG capsule; Take 1 capsule (100 mg total) by mouth 2 (two) times daily. for 14 days  Dispense: 28 capsule; Refill: 0  -     doxycycline (MONODOX) 100 MG capsule; Take 1 capsule (100 mg total) by mouth 2 (two) times daily. for 14 days  Dispense: 28 capsule; Refill: 0      Follow up in about 1 week (around 4/1/2022).    Procedures          Counseling:     I provided patient education verbally regarding:   Patient diagnosis, treatment options, as well as alternatives, risks, and benefits.     This note was created using Dragon voice recognition software that occasionally misinterpreted phrases or words.                  C/O of headache and nausea without vomiting since around 7pm.  Started while receiving dialysis.  took Tylenol but had no relief. Denies visual disturbances.

## 2022-03-31 ENCOUNTER — LAB VISIT (OUTPATIENT)
Dept: LAB | Facility: HOSPITAL | Age: 72
End: 2022-03-31
Attending: INTERNAL MEDICINE
Payer: MEDICARE

## 2022-03-31 ENCOUNTER — OFFICE VISIT (OUTPATIENT)
Dept: INFECTIOUS DISEASES | Facility: CLINIC | Age: 72
End: 2022-03-31
Payer: MEDICARE

## 2022-03-31 VITALS
OXYGEN SATURATION: 96 % | WEIGHT: 246.63 LBS | DIASTOLIC BLOOD PRESSURE: 68 MMHG | HEIGHT: 73 IN | SYSTOLIC BLOOD PRESSURE: 150 MMHG | BODY MASS INDEX: 32.69 KG/M2 | TEMPERATURE: 98 F | HEART RATE: 89 BPM

## 2022-03-31 DIAGNOSIS — M86.9 OSTEOMYELITIS OF GREAT TOE OF LEFT FOOT: Primary | ICD-10-CM

## 2022-03-31 DIAGNOSIS — E46 PROTEIN-CALORIE MALNUTRITION, UNSPECIFIED SEVERITY: ICD-10-CM

## 2022-03-31 DIAGNOSIS — E11.9 TYPE 2 DIABETES MELLITUS TREATED WITH INSULIN: ICD-10-CM

## 2022-03-31 DIAGNOSIS — D64.9 ANEMIA, UNSPECIFIED TYPE: ICD-10-CM

## 2022-03-31 DIAGNOSIS — E78.5 DYSLIPIDEMIA: ICD-10-CM

## 2022-03-31 DIAGNOSIS — Z79.4 TYPE 2 DIABETES MELLITUS TREATED WITH INSULIN: ICD-10-CM

## 2022-03-31 DIAGNOSIS — E66.9 OBESITY (BMI 30-39.9): ICD-10-CM

## 2022-03-31 DIAGNOSIS — M86.9 OSTEOMYELITIS OF GREAT TOE OF LEFT FOOT: ICD-10-CM

## 2022-03-31 LAB
25(OH)D3+25(OH)D2 SERPL-MCNC: 24 NG/ML (ref 30–96)
ALBUMIN SERPL BCP-MCNC: 4.1 G/DL (ref 3.5–5.2)
ALP SERPL-CCNC: 78 U/L (ref 55–135)
ALT SERPL W/O P-5'-P-CCNC: 26 U/L (ref 10–44)
ANION GAP SERPL CALC-SCNC: 9 MMOL/L (ref 8–16)
AST SERPL-CCNC: 21 U/L (ref 10–40)
BASOPHILS # BLD AUTO: 0.08 K/UL (ref 0–0.2)
BASOPHILS NFR BLD: 1 % (ref 0–1.9)
BILIRUB SERPL-MCNC: 0.3 MG/DL (ref 0.1–1)
BUN SERPL-MCNC: 22 MG/DL (ref 8–23)
CALCIUM SERPL-MCNC: 9.6 MG/DL (ref 8.7–10.5)
CHLORIDE SERPL-SCNC: 107 MMOL/L (ref 95–110)
CHOLEST SERPL-MCNC: 106 MG/DL (ref 120–199)
CHOLEST/HDLC SERPL: 2.9 {RATIO} (ref 2–5)
CO2 SERPL-SCNC: 25 MMOL/L (ref 23–29)
CREAT SERPL-MCNC: 1 MG/DL (ref 0.5–1.4)
CRP SERPL-MCNC: 0.52 MG/DL
DIFFERENTIAL METHOD: ABNORMAL
EOSINOPHIL # BLD AUTO: 0.2 K/UL (ref 0–0.5)
EOSINOPHIL NFR BLD: 2.1 % (ref 0–8)
ERYTHROCYTE [DISTWIDTH] IN BLOOD BY AUTOMATED COUNT: 17.4 % (ref 11.5–14.5)
ERYTHROCYTE [SEDIMENTATION RATE] IN BLOOD BY WESTERGREN METHOD: 32 MM/HR (ref 0–10)
EST. GFR  (AFRICAN AMERICAN): >60 ML/MIN/1.73 M^2
EST. GFR  (NON AFRICAN AMERICAN): >60 ML/MIN/1.73 M^2
ESTIMATED AVG GLUCOSE: 229 MG/DL (ref 68–131)
FERRITIN SERPL-MCNC: 175 NG/ML (ref 20–300)
GLUCOSE SERPL-MCNC: 106 MG/DL (ref 70–110)
HBA1C MFR BLD: 9.6 % (ref 4.5–6.2)
HCT VFR BLD AUTO: 36.9 % (ref 40–54)
HDLC SERPL-MCNC: 37 MG/DL (ref 40–75)
HDLC SERPL: 34.9 % (ref 20–50)
HGB BLD-MCNC: 10.7 G/DL (ref 14–18)
IMM GRANULOCYTES # BLD AUTO: 0.03 K/UL (ref 0–0.04)
IMM GRANULOCYTES NFR BLD AUTO: 0.4 % (ref 0–0.5)
IRON SERPL-MCNC: 53 UG/DL (ref 45–160)
LDLC SERPL CALC-MCNC: 42.8 MG/DL (ref 63–159)
LYMPHOCYTES # BLD AUTO: 2 K/UL (ref 1–4.8)
LYMPHOCYTES NFR BLD: 26 % (ref 18–48)
MCH RBC QN AUTO: 23.8 PG (ref 27–31)
MCHC RBC AUTO-ENTMCNC: 29 G/DL (ref 32–36)
MCV RBC AUTO: 82 FL (ref 82–98)
MONOCYTES # BLD AUTO: 0.6 K/UL (ref 0.3–1)
MONOCYTES NFR BLD: 8 % (ref 4–15)
NEUTROPHILS # BLD AUTO: 4.9 K/UL (ref 1.8–7.7)
NEUTROPHILS NFR BLD: 62.5 % (ref 38–73)
NONHDLC SERPL-MCNC: 69 MG/DL
NRBC BLD-RTO: 0 /100 WBC
PLATELET # BLD AUTO: 538 K/UL (ref 150–450)
PLATELET BLD QL SMEAR: ABNORMAL
PMV BLD AUTO: 10.5 FL (ref 9.2–12.9)
POTASSIUM SERPL-SCNC: 4.9 MMOL/L (ref 3.5–5.1)
PROT SERPL-MCNC: 7.9 G/DL (ref 6–8.4)
RBC # BLD AUTO: 4.49 M/UL (ref 4.6–6.2)
SATURATED IRON: 18 % (ref 20–50)
SODIUM SERPL-SCNC: 141 MMOL/L (ref 136–145)
TOTAL IRON BINDING CAPACITY: 300 UG/DL (ref 250–450)
TRANSFERRIN SERPL-MCNC: 214 MG/DL (ref 200–375)
TRIGL SERPL-MCNC: 131 MG/DL (ref 30–150)
TSH SERPL DL<=0.005 MIU/L-ACNC: 1.25 UIU/ML (ref 0.34–5.6)
VIT B12 SERPL-MCNC: 370 PG/ML (ref 210–950)
WBC # BLD AUTO: 7.78 K/UL (ref 3.9–12.7)

## 2022-03-31 PROCEDURE — 82607 VITAMIN B-12: CPT | Performed by: INTERNAL MEDICINE

## 2022-03-31 PROCEDURE — 80061 LIPID PANEL: CPT | Performed by: INTERNAL MEDICINE

## 2022-03-31 PROCEDURE — 84443 ASSAY THYROID STIM HORMONE: CPT | Performed by: INTERNAL MEDICINE

## 2022-03-31 PROCEDURE — 83036 HEMOGLOBIN GLYCOSYLATED A1C: CPT | Performed by: INTERNAL MEDICINE

## 2022-03-31 PROCEDURE — 36415 COLL VENOUS BLD VENIPUNCTURE: CPT | Performed by: INTERNAL MEDICINE

## 2022-03-31 PROCEDURE — 82728 ASSAY OF FERRITIN: CPT | Performed by: INTERNAL MEDICINE

## 2022-03-31 PROCEDURE — 99215 PR OFFICE/OUTPT VISIT, EST, LEVL V, 40-54 MIN: ICD-10-PCS | Mod: S$GLB,,, | Performed by: INTERNAL MEDICINE

## 2022-03-31 PROCEDURE — 85651 RBC SED RATE NONAUTOMATED: CPT | Performed by: INTERNAL MEDICINE

## 2022-03-31 PROCEDURE — 99215 OFFICE O/P EST HI 40 MIN: CPT | Mod: S$GLB,,, | Performed by: INTERNAL MEDICINE

## 2022-03-31 PROCEDURE — 82306 VITAMIN D 25 HYDROXY: CPT | Performed by: INTERNAL MEDICINE

## 2022-03-31 PROCEDURE — 84466 ASSAY OF TRANSFERRIN: CPT | Performed by: INTERNAL MEDICINE

## 2022-03-31 PROCEDURE — 80053 COMPREHEN METABOLIC PANEL: CPT | Performed by: INTERNAL MEDICINE

## 2022-03-31 PROCEDURE — 86140 C-REACTIVE PROTEIN: CPT | Performed by: INTERNAL MEDICINE

## 2022-03-31 PROCEDURE — 85025 COMPLETE CBC W/AUTO DIFF WBC: CPT | Performed by: INTERNAL MEDICINE

## 2022-03-31 RX ORDER — DALBAVANCIN 500 MG/25ML
1500 INJECTION, POWDER, FOR SOLUTION INTRAVENOUS
Qty: 225 ML | Refills: 0 | Status: SHIPPED | OUTPATIENT
Start: 2022-03-31 | End: 2022-04-15

## 2022-03-31 RX ORDER — DOXYCYCLINE 100 MG/1
100 CAPSULE ORAL 2 TIMES DAILY
Qty: 120 CAPSULE | Refills: 0 | Status: SHIPPED | OUTPATIENT
Start: 2022-03-31 | End: 2022-05-13 | Stop reason: SDUPTHER

## 2022-03-31 NOTE — PATIENT INSTRUCTIONS
Manage Diabetes  Manage Cholesterol  Manage blood pressure  Elevate leg   Take MVI daily  Eat more protein      Blood work  Ultrasound of legs

## 2022-03-31 NOTE — PROGRESS NOTES
"    Reason for consult:   Subjective:      Patient ID: Bandar Tomas is a 71 y.o. male.    Chief Complaint:: New Patient for Left Foot Necrosis of Bone    HPI 71-year-old man with past medical history of obesity, BMI 32, HTN, D LP, DM, uncontrolled, on insulin NPH/regular, hemoglobin A1c 16, used to live in [place] and used to follow at [facility], he has moved to [place] to be close to his daughter and grandchildren.    In the beginning of February he noticed a blister over the left 1st toe which got progressively worse then he needed left great toe amputation on 02/17/2022, by Dr. Jesse Rodríguez. "Due to the degree of soft tissue necrosis around the metatarsophalangeal joint the incision was not able to be fully primarily closed."      Patient was following with Dr. Kirsty Rodríguez.  The wound needed a wound VAC and was not healing properly.  Cultures obtained on 03/18/2022 showed Staphylococcus Simulans, and Corynebacterium stratum  Patient has been on doxycycline.  He missed an appointment with me due to transportation  He feels that the wound is steadily improving    Review of patient's allergies indicates:  No Known Allergies  Past Medical History:   Diagnosis Date    Diabetes     Former smoker     HTN (hypertension)      Past Surgical History:   Procedure Laterality Date    COLONOSCOPY  prior to 2007    COLONOSCOPY N/A 3/31/2016    Procedure: COLONOSCOPY;  Surgeon: Perry Rodriguez MD;  Location[facility];  Service: Endoscopy;  Laterality: N/A;    SHOULDER SURGERY Left     TOE AMPUTATION Left 2/17/2022    Procedure: AMPUTATION, TOE;  Surgeon: Jesse Rodríguez DPM;  Location: [facility];  Service: Podiatry;  Laterality: Left;    UPPER GASTROINTESTINAL ENDOSCOPY       Social History     Tobacco Use    Smoking status: Former Smoker    Smokeless tobacco: Never Used   Substance Use Topics    Alcohol use: Yes     Alcohol/week: 0.0 standard drinks        Hunting:  Fishing:  Pets:  Exposure to sick " contacts:  Exposure to TB:  Travel:     Family History   Problem Relation Age of Onset    Brain cancer Brother 54    Esophageal cancer Paternal Grandfather 87    Colon polyps Neg Hx        Review of Systems   Constitutional: Positive for unexpected weight change (gaining). Negative for appetite change (trying to eat healthy, ), chills, diaphoresis, fatigue and fever. Activity change: tired , not moveing , Retired. [employer] work.   HENT: Negative for congestion, dental problem, ear pain, hearing loss, mouth sores, postnasal drip, rhinorrhea, sinus pain, sore throat and trouble swallowing.    Eyes: Negative for photophobia, pain, discharge, redness, itching and visual disturbance.   Respiratory: Negative for apnea, cough, choking, chest tightness, shortness of breath, wheezing and stridor.    Cardiovascular: Negative for chest pain, palpitations and leg swelling.   Gastrointestinal: Negative for abdominal distention, abdominal pain, anal bleeding, blood in stool, constipation, diarrhea, nausea, rectal pain and vomiting.        ? Colon surgery ? Polyp 2 years ago      Due for repeat colonoscopy(at [facility]) with Dr Stroud   Endocrine: Positive for polydipsia and polyuria. Negative for cold intolerance and heat intolerance.        Dm x18    Only x1 hypogl 69 -- he did not feel it    Asked for glc strips    Genitourinary: Negative for decreased urine volume, difficulty urinating, dysuria, enuresis, flank pain, frequency, genital sores, hematuria, penile discharge, penile pain, penile swelling, scrotal swelling, testicular pain and urgency.        BPH sp surgery    Musculoskeletal: Negative for arthralgias, back pain (not any more, sp surgery, . left shoulder surgery, by dr Sam), gait problem, joint swelling, myalgias, neck pain and neck stiffness.   Skin: Positive for wound (feet bl). Negative for color change, pallor and rash.   Allergic/Immunologic: Negative for environmental allergies, food allergies and  "immunocompromised state.   Neurological: Negative for dizziness, tremors, seizures, syncope, facial asymmetry, speech difficulty, weakness, light-headedness, numbness and headaches.   Hematological: Negative for adenopathy. Does not bruise/bleed easily.   Psychiatric/Behavioral: Negative for agitation, behavioral problems, confusion, decreased concentration, dysphoric mood, hallucinations, self-injury, sleep disturbance and suicidal ideas. The patient is not nervous/anxious and is not hyperactive.         Pertinent medications noted:     Objective:      Blood pressure (!) 150/68, pulse 89, temperature 97.6 °F (36.4 °C), height 6' 1" (1.854 m), weight 111.9 kg (246 lb 9.6 oz), SpO2 96 %.  Body mass index is 32.53 kg/m².  Physical Exam  Constitutional:       General: He is not in acute distress.     Appearance: He is not diaphoretic.   HENT:      Head: Atraumatic.      Right Ear: External ear normal.      Left Ear: External ear normal.      Nose: Nose normal.   Eyes:      General: No scleral icterus.     Conjunctiva/sclera: Conjunctivae normal.      Pupils: Pupils are equal, round, and reactive to light.   Neck:      Vascular: No JVD.   Cardiovascular:      Rate and Rhythm: Normal rate and regular rhythm.      Heart sounds: Normal heart sounds.   Pulmonary:      Effort: Pulmonary effort is normal.      Breath sounds: Normal breath sounds. No wheezing or rales.   Chest:      Chest wall: No tenderness.   Abdominal:      General: Bowel sounds are normal. There is no distension.      Palpations: Abdomen is soft. There is no mass.      Tenderness: There is no abdominal tenderness. There is no guarding or rebound.   Musculoskeletal:         General: Normal range of motion.      Cervical back: Normal range of motion and neck supple.   Lymphadenopathy:      Cervical: No cervical adenopathy.   Skin:     General: Skin is warm and dry.      Findings: No erythema or rash.   Neurological:      Mental Status: He is alert and " oriented to person, place, and time.      Cranial Nerves: No cranial nerve deficit.   Psychiatric:         Behavior: Behavior normal.         Thought Content: Thought content normal.         VAD:         02/14                                                                          General Labs reviewed:  Lab Results   Component Value Date    WBC 7.78 03/31/2022    RBC 4.49 (L) 03/31/2022    HGB 10.7 (L) 03/31/2022    HCT 36.9 (L) 03/31/2022    MCV 82 03/31/2022    MCH 23.8 (L) 03/31/2022    MCHC 29.0 (L) 03/31/2022    RDW 17.4 (H) 03/31/2022     (H) 03/31/2022    MPV 10.5 03/31/2022    GRAN 4.9 03/31/2022    GRAN 62.5 03/31/2022    LYMPH 2.0 03/31/2022    LYMPH 26.0 03/31/2022    MONO 0.6 03/31/2022    MONO 8.0 03/31/2022    EOS 0.2 03/31/2022    BASO 0.08 03/31/2022    EOSINOPHIL 2.1 03/31/2022    BASOPHIL 1.0 03/31/2022     CMP  Sodium   Date Value Ref Range Status   03/31/2022 141 136 - 145 mmol/L Final     Potassium   Date Value Ref Range Status   03/31/2022 4.9 3.5 - 5.1 mmol/L Final     Chloride   Date Value Ref Range Status   03/31/2022 107 95 - 110 mmol/L Final     CO2   Date Value Ref Range Status   03/31/2022 25 23 - 29 mmol/L Final     Glucose   Date Value Ref Range Status   03/31/2022 106 70 - 110 mg/dL Final     BUN   Date Value Ref Range Status   03/31/2022 22 8 - 23 mg/dL Final     Creatinine   Date Value Ref Range Status   03/31/2022 1.0 0.5 - 1.4 mg/dL Final     Calcium   Date Value Ref Range Status   03/31/2022 9.6 8.7 - 10.5 mg/dL Final     Total Protein   Date Value Ref Range Status   03/31/2022 7.9 6.0 - 8.4 g/dL Final     Albumin   Date Value Ref Range Status   03/31/2022 4.1 3.5 - 5.2 g/dL Final     Total Bilirubin   Date Value Ref Range Status   03/31/2022 0.3 0.1 - 1.0 mg/dL Final     Comment:     For infants and newborns, interpretation of results should be based  on gestational age, weight and in agreement with clinical  observations.    Premature Infant recommended reference  ranges:  Up to 24 hours.............<8.0 mg/dL  Up to 48 hours............<12.0 mg/dL  3-5 days..................<15.0 mg/dL  6-29 days.................<15.0 mg/dL       Alkaline Phosphatase   Date Value Ref Range Status   03/31/2022 78 55 - 135 U/L Final     AST   Date Value Ref Range Status   03/31/2022 21 10 - 40 U/L Final     ALT   Date Value Ref Range Status   03/31/2022 26 10 - 44 U/L Final     Anion Gap   Date Value Ref Range Status   03/31/2022 9 8 - 16 mmol/L Final     eGFR if    Date Value Ref Range Status   03/31/2022 >60.0 >60 mL/min/1.73 m^2 Final     eGFR if non    Date Value Ref Range Status   03/31/2022 >60.0 >60 mL/min/1.73 m^2 Final     Comment:     Calculation used to obtain the estimated glomerular filtration  rate (eGFR) is the CKD-EPI equation.          Micro:  Microbiology Results (last 7 days)     ** No results found for the last 168 hours. **        Imaging Reviewed:    Assessment:       1. Osteomyelitis of great toe of left foot    2. Type 2 diabetes mellitus treated with insulin    3. Anemia, unspecified type    4. Obesity (BMI 30-39.9)    5. Dyslipidemia    6. Protein-calorie malnutrition, unspecified severity     7. Body mass index (BMI) 32.0-32.9, adult            Plan:       Osteomyelitis of great toe of left foot  -     dalbavancin (DALVANCE) 500 mg Soln injection; Inject 75 mLs (1,500 mg total) into the vein every 7 days. for 3 doses  Dispense: 225 mL; Refill: 0  -     Hemoglobin A1C; Future; Expected date: 03/31/2022  -     blood sugar diagnostic Strp; 100 each.  Supply whatever insurance covers.  Dispense: 100 each; Refill: 11  -     Lipid Panel; Standing; Expected date: 03/31/2022  -      Lower Extremity Arteries Bilateral; Future; Expected date: 03/31/2022  -     Comprehensive Metabolic Panel; Future; Expected date: 03/31/2022  -     CBC Auto Differential; Future; Expected date: 03/31/2022  -     C-Reactive Protein; Future; Expected date:  03/31/2022  -     Vitamin B12; Future; Expected date: 03/31/2022  -     Sedimentation rate; Future; Expected date: 03/31/2022  -     TSH; Future; Expected date: 03/31/2022  -     Vitamin D; Future; Expected date: 03/31/2022  -     doxycycline (MONODOX) 100 MG capsule; Take 1 capsule (100 mg total) by mouth 2 (two) times daily.  Dispense: 120 capsule; Refill: 0  -     SUBSEQUENT HOME HEALTH ORDERS    Type 2 diabetes mellitus treated with insulin  -     Hemoglobin A1C; Future; Expected date: 03/31/2022  -     blood sugar diagnostic Strp; 100 each.  Supply whatever insurance covers.  Dispense: 100 each; Refill: 11  -     Lipid Panel; Standing; Expected date: 03/31/2022  -     Vitamin D; Future; Expected date: 03/31/2022  -     Ambulatory Referral/Consult to Primary Care Diabetic Management; Future; Expected date: 04/07/2022    Anemia, unspecified type  -     Vitamin B12; Future; Expected date: 03/31/2022  -     Iron and TIBC; Future; Expected date: 03/31/2022  -     Ferritin; Future; Expected date: 03/31/2022    Obesity (BMI 30-39.9)  -     Lipid Panel; Standing; Expected date: 03/31/2022  -     Vitamin D; Future; Expected date: 03/31/2022    Dyslipidemia  -     Lipid Panel; Standing; Expected date: 03/31/2022  -     Vitamin D; Future; Expected date: 03/31/2022    Protein-calorie malnutrition, unspecified severity   -     Vitamin B12; Future; Expected date: 03/31/2022    Body mass index (BMI) 32.0-32.9, adult   -     Vitamin D; Future; Expected date: 03/31/2022      Follow up in about 1 week (around 4/7/2022), or if symptoms worsen or fail to improve.      Osteomyelitis of the left 1st metatarsal.  Organisms on 03/18/2022 are corynebacterium striatum and Staphylococcus Simulans  Will try Dalvance;  If that does not go through will consider vancomycin.      This note was created using Dragon voice recognition software that occasionally misinterpreted phrases or words.

## 2022-04-01 ENCOUNTER — OFFICE VISIT (OUTPATIENT)
Dept: WOUND CARE | Facility: HOSPITAL | Age: 72
End: 2022-04-01
Attending: PODIATRIST
Payer: MEDICARE

## 2022-04-01 VITALS
HEART RATE: 88 BPM | DIASTOLIC BLOOD PRESSURE: 52 MMHG | SYSTOLIC BLOOD PRESSURE: 145 MMHG | RESPIRATION RATE: 18 BRPM | TEMPERATURE: 97 F

## 2022-04-01 DIAGNOSIS — L08.9 DIABETIC FOOT INFECTION: ICD-10-CM

## 2022-04-01 DIAGNOSIS — L97.524 ULCER OF LEFT FOOT WITH NECROSIS OF BONE: Primary | ICD-10-CM

## 2022-04-01 DIAGNOSIS — L97.513 ULCER OF RIGHT FOOT WITH NECROSIS OF MUSCLE: ICD-10-CM

## 2022-04-01 DIAGNOSIS — E66.9 OBESITY (BMI 30.0-34.9): ICD-10-CM

## 2022-04-01 DIAGNOSIS — M86.10 ACUTE OSTEOMYELITIS: ICD-10-CM

## 2022-04-01 DIAGNOSIS — Z91.89 AT HIGH RISK FOR INADEQUATE NUTRITIONAL INTAKE: ICD-10-CM

## 2022-04-01 DIAGNOSIS — L97.522 ULCER OF LEFT FOOT WITH FAT LAYER EXPOSED: ICD-10-CM

## 2022-04-01 DIAGNOSIS — E11.628 DIABETIC FOOT INFECTION: ICD-10-CM

## 2022-04-01 DIAGNOSIS — E11.65 UNCONTROLLED TYPE 2 DIABETES MELLITUS WITH HYPERGLYCEMIA: ICD-10-CM

## 2022-04-01 DIAGNOSIS — E11.9 COMPREHENSIVE DIABETIC FOOT EXAMINATION, TYPE 2 DM, ENCOUNTER FOR: ICD-10-CM

## 2022-04-01 DIAGNOSIS — Z89.432 HISTORY OF AMPUTATION OF LEFT FOOT: ICD-10-CM

## 2022-04-01 DIAGNOSIS — R09.89 DECREASED PEDAL PULSES: ICD-10-CM

## 2022-04-01 DIAGNOSIS — Z91.199 NON COMPLIANCE WITH MEDICAL TREATMENT: ICD-10-CM

## 2022-04-01 PROCEDURE — 99499 NO LOS: ICD-10-PCS | Mod: ,,, | Performed by: PODIATRIST

## 2022-04-01 PROCEDURE — 11042 DBRDMT SUBQ TIS 1ST 20SQCM/<: CPT | Mod: 59,,, | Performed by: PODIATRIST

## 2022-04-01 PROCEDURE — 11043 PR DEBRIDEMENT, SKIN, SUB-Q TISSUE,MUSCLE,=<20 SQ CM: ICD-10-PCS | Mod: ,,, | Performed by: PODIATRIST

## 2022-04-01 PROCEDURE — 97605 NEG PRS WND THER DME<=50SQCM: CPT | Performed by: PODIATRIST

## 2022-04-01 PROCEDURE — 11042 DBRDMT SUBQ TIS 1ST 20SQCM/<: CPT | Mod: 59 | Performed by: PODIATRIST

## 2022-04-01 PROCEDURE — 11043 DBRDMT MUSC&/FSCA 1ST 20/<: CPT | Mod: ,,, | Performed by: PODIATRIST

## 2022-04-01 PROCEDURE — 99499 UNLISTED E&M SERVICE: CPT | Mod: ,,, | Performed by: PODIATRIST

## 2022-04-01 PROCEDURE — 11043 DBRDMT MUSC&/FSCA 1ST 20/<: CPT | Performed by: PODIATRIST

## 2022-04-01 PROCEDURE — 11042 PR DEBRIDEMENT, SKIN, SUB-Q TISSUE,=<20 SQ CM: ICD-10-PCS | Mod: 59,,, | Performed by: PODIATRIST

## 2022-04-01 NOTE — PROGRESS NOTES
[address]  [place] [address]  Phone: [phone]   [phone]    Patient's PCP:Veena Stroud MD  Referring Provider: No ref. provider found    Subjective:      Chief Complaint:: Wound Care, Non-healing Wound, Pressure Ulcer, Wound Infection, Diabetic Foot Ulcer, and Diabetes    HPI      1. Debridement See Wound Docs for assessment of wounds and procedure notes  2. Continue taking all medications as prescribed, IV antibiotics per Infectious Disease  3. Continue home health dressing changes, wound VAC to left foot wounds.  Silver alginate with football wrap to right foot wound   4. RTC one week   5. Counseled patient on increasing protein intake, not getting wounds wet, keeping dressings clean dry and intact, following a healthy diet, elevating legs when able, removing pressure from wounds     Total time spent for E&M 30  Total time for debridement 35 minutes      Athlete's foot counseling: Counseled patient that it is important to keep the feet dry. Use absorbent cotton socks and change them if they become sweaty. Or wear an open-toe shoe or sandal. Wash the feet at least once a day with soap and water. Apply the antifungal cream as prescribed. Recommend spray antiperspirant to the feet. Some antifungal creams are available without a prescription. It may take a week before the rash starts to improve. It can take about 3 to 4 weeks to completely clear. Continue the medicine until the rash is all gone. Use over-the-counter antifungal powders or sprays on your feet after exposure to high-risk environments, such as public showers, gyms, and locker rooms. This can help prevent future infections. Wearing appropriate shoes in these situations can help.        Fungal infection of skin explained. Treatment options including no treatment, topical medications, oral medications were discussed, as well as success rates and risks of recurrence. We agreed on topical medication         Bandar Tomas is  a 71 y.o. male  with past medical history of hypertension, hyperlipidemia, uncontrolled diabetes, diabetic neuropathy who presents to clinic with a complaint of left great toe ulcer status post amputation of left great toe lasting for approximately 2 weeks. Onset of symptoms was initially patient had a severe infected wound on the left foot and was admitted to the hospital.  See discharge summary below.  He underwent a left great toe amputation with keeping the amputation site open due to not having enough viable skin for closure.  A wound VAC was started.  Referral to wound care was placed.     Upon presentation today, patient also has a open wound on the plantar right foot.  Patient states he does not check his feet so is unsure how long this has been present.  Patient's daughter is bedside and states patient lives alone and is not compliant with his diabetic medication, checking his feet, and additional recommendations by his doctors.  Counseled patient on the need to be compliant.  Counseled patient that if he is not compliant he is risking limb loss as well.        Counseled patient regarding the need to get his blood sugars under control.  Discussed with patient that with blood sugars at this high level it is very difficult to heal wounds.        Discharge summary on 02/21/2022:  HPI:   Mr. Reyes is a 71-year-old male with a past medical history of hypertension, hyperlipidemia, insulin-dependent diabetes mellitus type 2, and obesity BMI 32 who presents today with complaints of a left toe infection.  It is severe.  It is associated with purulent drainage, malodor, and pain.  He denies fever, chills, nausea, vomiting, diarrhea, dizziness, chest pain, shortness of breath, loss of consciousness.  He has known injury to the toe.  He states he took off his today issues about 2 weeks ago and noticeable ulcerations status toe.  He went to the pharmacy and ask pharmacist for recommendations and was recommended triple  antibiotic ointment and soaks for the toe which he did with no improvement.  He does not have a podiatrist.  He states his glucoses have been out of control over the last 2 weeks foot due to this infection.  He does not have an endocrinologist.  X-ray of foot in the ED revealed: Osteomyelitis of great toe proximal and distal phalanges. Pathologic fracture of great toe distal phalanx  Procedure(s) (LRB):  AMPUTATION, TOE (Left)    Hospital Course:   Patient admitted with Diabetic ulcer of Left Great toe and associated acute Osteomyelitis  Pt underwent L great toe amputation  Later pt was discharged to home with HH /wound vac  Extensive education was provided regarding his Uncontrolled DM type 2            Systemic Doctor: Veena Stroud MD  Date Last Seen: about 4 months ago  Blood Sugar: doesn't check  Hemoglobin A1c: 16.4 (2/15/2022)        Patient should call the office immediately if any signs of infection, such as fever, chills, sweats, increased redness or pain.     Patient was instructed to call the clinic or go to the emergency department if their symptoms do not improve, worsens, or if new symptoms develop.  Patient was advised that if any increased swelling, pain, or numbness arise to go immediately to the ED. Patient knows to call any time if an emergency arises. Shared decision making occurred and patient verbalized understanding in agreement with this plan.         Counseling/Education:  I provided patient education verbally regarding:   The aspects of diabetes and how it pertains to the feet. I explained the importance of proper diabetic foot care and how it is essential for the health of their feet.     I discussed the importance of knowing their Hemoglobin A1c and that the level needs to be as close to 6 as possible. I discussed the increase complications of high blood sugar including stroke, blindness, heart attack, kidney failure and loss of limb secondary to neuropathy and PVD.      With  neuropathy, beware of any breaks in the skin or redness. These areas are not recognized early due to the numbness.     I discussed Diabetes, lower back issues, metabolic disorders, systemic causes, chemotherapy, vitamin deficiency, heavy metal exposure, as some of the causes. I also explained that as much as 40% of the time we can not find a cause. I discussed different treatments available to control the symptoms but which may not cure the problem.         Counseled patient on the aspects of diabetes and how it pertains to the feet.  I explained the importance of proper diabetic foot care and how it is essential for the health of their feet.        Shoe inspection. Patient instructed on proper foot hygeine. We discussed wearing proper shoe gear, daily foot inspections, never walking without protective shoe gear, never putting sharp instruments to feet, routine podiatric nail visits every 2-3 months.       I counseled the patient on their conditions, their implications and medical management.     >50% of this > 60 minute visit was spent face to face educating/counseling the patient     I spent a total of 60 minutes on the day of the visit.This includes face to face time and non-face to face time preparing to see the patient (eg, review of tests), obtaining and/or reviewing separately obtained history, documenting clinical information in the electronic or other health record, independently interpreting results and communicating results to the patient/family/caregiver, or care coordinator.        Much of the documentation for this visit was completed in the Wound Docs system.  Please see the attached documentation for further details about the patient's care. Scanned under the Media tab.         Kirsty Rodríguez DPM     Vitals:    04/01/22 0843   BP: (!) 145/52   Pulse: 88   Resp: 18   Temp: 96.9 °F (36.1 °C)   TempSrc: Skin   PainSc: 0-No pain      Shoe Size:     Past Surgical History:   Procedure Laterality Date     COLONOSCOPY  prior to 2007    COLONOSCOPY N/A 3/31/2016    Procedure: COLONOSCOPY;  Surgeon: Perry Rodriguez MD;  [facility];  Service: Endoscopy;  Laterality: N/A;    SHOULDER SURGERY Left     TOE AMPUTATION Left 2/17/2022    Procedure: AMPUTATION, TOE;  Surgeon: Jesse Rodríguez DPM;  [facility]: [facility];  Service: Podiatry;  Laterality: Left;    UPPER GASTROINTESTINAL ENDOSCOPY       Past Medical History:   Diagnosis Date    Diabetes     Former smoker     HTN (hypertension)      Family History   Problem Relation Age of Onset    Brain cancer Brother 54    Esophageal cancer Paternal Grandfather 87    Colon polyps Neg Hx         Social History:   Marital Status:   Alcohol History:  reports current alcohol use.  Tobacco History:  reports that he has quit smoking. He has never used smokeless tobacco.  Drug History:  reports no history of drug use.    Review of patient's allergies indicates:  No Known Allergies    Current Outpatient Medications   Medication Sig Dispense Refill    aspirin 81 MG Chew Take 81 mg by mouth once daily.      blood sugar diagnostic Strp 100 each.  Supply whatever insurance covers. 100 each 11    dalbavancin (DALVANCE) 500 mg Soln injection Inject 75 mLs (1,500 mg total) into the vein every 7 days. for 3 doses 225 mL 0    doxycycline (MONODOX) 100 MG capsule Take 1 capsule (100 mg total) by mouth 2 (two) times daily. 120 capsule 0    hydroCHLOROthiazide (HYDRODIURIL) 25 MG tablet Take 25 mg by mouth every morning.      HYDROcodone-acetaminophen (NORCO) 5-325 mg per tablet Take 1 tablet by mouth every 6 (six) hours as needed for Pain. 30 tablet 0    insulin  unit/mL injection Inject 25 Units into the skin 2 (two) times daily before meals.      insulin regular 100 unit/mL Inj injection Inject 25 Units into the skin 2 (two) times daily before meals.      losartan (COZAAR) 100 MG tablet Take 100 mg by mouth once daily.      pravastatin (PRAVACHOL) 40 MG  tablet Take 40 mg by mouth once daily.      spironolactone (ALDACTONE) 50 MG tablet Take 50 mg by mouth once daily.       No current facility-administered medications for this visit.       Review of Systems   Constitutional: Negative for chills, fatigue, fever and unexpected weight change.   HENT: Negative for hearing loss and trouble swallowing.    Eyes: Negative for photophobia and visual disturbance.   Respiratory: Negative for cough, shortness of breath and wheezing.    Cardiovascular: Negative for chest pain, palpitations and leg swelling.   Gastrointestinal: Negative for abdominal pain and nausea.   Genitourinary: Negative for dysuria and frequency.   Musculoskeletal: Negative for arthralgias, back pain, gait problem, joint swelling and myalgias.   Skin: Positive for wound. Negative for rash.   Neurological: Negative for tremors, seizures, weakness, numbness and headaches.   Hematological: Does not bruise/bleed easily.         Objective:        Physical Exam:   Foot Exam  Physical Exam  Ortho/SPM Exam     Imaging:            Assessment:       1. Ulcer of left foot with necrosis of bone    2. Ulcer of right foot with necrosis of muscle    3. Ulcer of left foot with fat layer exposed    4. Uncontrolled type 2 diabetes mellitus with hyperglycemia    5. Obesity (BMI 30.0-34.9)    6. At high risk for inadequate nutritional intake    7. Comprehensive diabetic foot examination, type 2 DM, encounter for    8. Non compliance with medical treatment    9. Decreased pedal pulses    10. History of amputation of left foot    11. Diabetic foot infection    12. Acute osteomyelitis      Plan:   Ulcer of left foot with necrosis of bone    Ulcer of right foot with necrosis of muscle    Ulcer of left foot with fat layer exposed    Uncontrolled type 2 diabetes mellitus with hyperglycemia    Obesity (BMI 30.0-34.9)    At high risk for inadequate nutritional intake    Comprehensive diabetic foot examination, type 2 DM, encounter  for    Non compliance with medical treatment    Decreased pedal pulses    History of amputation of left foot    Diabetic foot infection    Acute osteomyelitis      Follow up in about 1 week (around 4/8/2022).    Procedures          Counseling:     I provided patient education verbally regarding:   Patient diagnosis, treatment options, as well as alternatives, risks, and benefits.     This note was created using Dragon voice recognition software that occasionally misinterpreted phrases or words.

## 2022-04-04 ENCOUNTER — INFUSION (OUTPATIENT)
Dept: INFUSION THERAPY | Facility: HOSPITAL | Age: 72
End: 2022-04-04
Attending: INTERNAL MEDICINE
Payer: MEDICARE

## 2022-04-04 VITALS
HEART RATE: 85 BPM | OXYGEN SATURATION: 98 % | WEIGHT: 260.13 LBS | BODY MASS INDEX: 34.47 KG/M2 | RESPIRATION RATE: 14 BRPM | SYSTOLIC BLOOD PRESSURE: 142 MMHG | DIASTOLIC BLOOD PRESSURE: 82 MMHG | HEIGHT: 73 IN | TEMPERATURE: 98 F

## 2022-04-04 DIAGNOSIS — E08.621 DIABETIC ULCER OF LEFT MIDFOOT ASSOCIATED WITH DIABETES MELLITUS DUE TO UNDERLYING CONDITION, LIMITED TO BREAKDOWN OF SKIN: Primary | ICD-10-CM

## 2022-04-04 DIAGNOSIS — L97.421 DIABETIC ULCER OF LEFT MIDFOOT ASSOCIATED WITH DIABETES MELLITUS DUE TO UNDERLYING CONDITION, LIMITED TO BREAKDOWN OF SKIN: Primary | ICD-10-CM

## 2022-04-04 PROCEDURE — 96365 THER/PROPH/DIAG IV INF INIT: CPT

## 2022-04-04 PROCEDURE — 63600175 PHARM REV CODE 636 W HCPCS: Mod: JG | Performed by: INTERNAL MEDICINE

## 2022-04-04 PROCEDURE — 25000003 PHARM REV CODE 250: Performed by: INTERNAL MEDICINE

## 2022-04-04 RX ADMIN — DALBAVANCIN 1500 MG: 500 INJECTION, POWDER, FOR SOLUTION INTRAVENOUS at 09:04

## 2022-04-07 ENCOUNTER — OFFICE VISIT (OUTPATIENT)
Dept: INFECTIOUS DISEASES | Facility: CLINIC | Age: 72
End: 2022-04-07
Payer: MEDICARE

## 2022-04-07 VITALS
HEART RATE: 86 BPM | OXYGEN SATURATION: 98 % | WEIGHT: 250.63 LBS | DIASTOLIC BLOOD PRESSURE: 78 MMHG | HEIGHT: 73 IN | BODY MASS INDEX: 33.22 KG/M2 | TEMPERATURE: 98 F | SYSTOLIC BLOOD PRESSURE: 156 MMHG

## 2022-04-07 DIAGNOSIS — E11.9 TYPE 2 DIABETES MELLITUS TREATED WITH INSULIN: ICD-10-CM

## 2022-04-07 DIAGNOSIS — Z79.4 TYPE 2 DIABETES MELLITUS TREATED WITH INSULIN: ICD-10-CM

## 2022-04-07 DIAGNOSIS — M86.9 OSTEOMYELITIS OF GREAT TOE OF LEFT FOOT: Primary | ICD-10-CM

## 2022-04-07 DIAGNOSIS — D64.9 ANEMIA, UNSPECIFIED TYPE: ICD-10-CM

## 2022-04-07 DIAGNOSIS — E46 PROTEIN-CALORIE MALNUTRITION, UNSPECIFIED SEVERITY: ICD-10-CM

## 2022-04-07 DIAGNOSIS — E66.9 OBESITY (BMI 30-39.9): ICD-10-CM

## 2022-04-07 PROCEDURE — 99214 PR OFFICE/OUTPT VISIT, EST, LEVL IV, 30-39 MIN: ICD-10-PCS | Mod: S$GLB,,, | Performed by: INTERNAL MEDICINE

## 2022-04-07 PROCEDURE — 99214 OFFICE O/P EST MOD 30 MIN: CPT | Mod: S$GLB,,, | Performed by: INTERNAL MEDICINE

## 2022-04-07 NOTE — PROGRESS NOTES
"      Reason for consult:   Subjective:      Patient ID: Bandar Tomas is a 71 y.o. male.    Chief Complaint:: Follow-up    HPI 71-year-old man with past medical history of obesity, BMI 32, HTN, D LP, DM, uncontrolled, on insulin NPH/regular, hemoglobin A1c 16, used to live in [place] and used to follow at [facility], he has moved to [place] to be close to his daughter and grandchildren.    In the beginning of February he noticed a blister over the left 1st toe which got progressively worse then he needed left great toe amputation on 02/17/2022, by Dr. Jesse Rodríguez. "Due to the degree of soft tissue necrosis around the metatarsophalangeal joint the incision was not able to be fully primarily closed."      Patient was following with Dr. Kirsty Rodríguez.  The wound needed a wound VAC and was not healing properly.  Cultures obtained on 03/18/2022 showed Staphylococcus Simulans, and Corynebacterium stratum  Patient has been on doxycycline.  He missed an appointment with me due to transportation  He feels that the wound is steadily improving          04/07/2022  He has wound vac on, He takes pictures for me when wound vac is taken down  Wound is doing well  No systemic signs of infection  04/04-- d1 dalvance is already received  04/11-- it will be d8 of dalvance    Doxycycline I had ordered on prior visited for whatever reason had not gone through to pharmacy.  I gave a handwritten prescription to patient today.    Review of patient's allergies indicates:  No Known Allergies  Past Medical History:   Diagnosis Date    Diabetes     Former smoker     HTN (hypertension)      Past Surgical History:   Procedure Laterality Date    COLONOSCOPY  prior to 2007    COLONOSCOPY N/A 3/31/2016    Procedure: COLONOSCOPY;  Surgeon: Perry Rodriguez MD;  Location: [facility];  Service: Endoscopy;  Laterality: N/A;    SHOULDER SURGERY Left     TOE AMPUTATION Left 2/17/2022    Procedure: AMPUTATION, TOE;  Surgeon: Jesse Rodríguez DPM;  " Location: [facility] OR;  Service: Podiatry;  Laterality: Left;    UPPER GASTROINTESTINAL ENDOSCOPY       Social History     Tobacco Use    Smoking status: Former Smoker    Smokeless tobacco: Never Used   Substance Use Topics    Alcohol use: Yes     Alcohol/week: 0.0 standard drinks        Hunting:  Fishing:  Pets:  Exposure to sick contacts:  Exposure to TB:  Travel:     Family History   Problem Relation Age of Onset    Brain cancer Brother 54    Esophageal cancer Paternal Grandfather 87    Colon polyps Neg Hx        Review of Systems   Constitutional: Positive for unexpected weight change (gaining). Negative for appetite change (trying to eat healthy, ), chills, diaphoresis, fatigue and fever. Activity change: tired , not moving much since he has been retired  HENT: Negative for congestion, dental problem, ear pain, hearing loss, mouth sores, postnasal drip, rhinorrhea, sinus pain, sore throat and trouble swallowing.    Eyes: Negative for photophobia, pain, discharge, redness, itching and visual disturbance.   Respiratory: Negative for apnea, cough, choking, chest tightness, shortness of breath, wheezing and stridor.    Cardiovascular: Negative for chest pain, palpitations and leg swelling.   Gastrointestinal: Negative for abdominal distention, abdominal pain, anal bleeding, blood in stool, constipation, diarrhea, nausea, rectal pain and vomiting.        ? Colon surgery ? Polyp 2 years ag  Due for repeat colonoscopy(at [facility]) with Dr Stroud   Endocrine: Positive for polydipsia and polyuria. Negative for cold intolerance and heat intolerance.        Dm x18    Only x1 hypogl 69 -- he did not feel it    Asked for glc strips    Genitourinary: Negative for decreased urine volume, difficulty urinating, dysuria, enuresis, flank pain, frequency, genital sores, hematuria, penile discharge, penile pain, penile swelling, scrotal swelling, testicular pain and urgency.        BPH sp surgery    Musculoskeletal: Negative for  "arthralgias, back pain (not any more, sp surgery, . left shoulder surgery, by dr Sam), gait problem, joint swelling, myalgias, neck pain and neck stiffness.   Skin: Positive for wound (feet bl). Negative for color change, pallor and rash.   Allergic/Immunologic: Negative for environmental allergies, food allergies and immunocompromised state.   Neurological: Negative for dizziness, tremors, seizures, syncope, facial asymmetry, speech difficulty, weakness, light-headedness, numbness and headaches.   Hematological: Negative for adenopathy. Does not bruise/bleed easily.   Psychiatric/Behavioral: Negative for agitation, behavioral problems, confusion, decreased concentration, dysphoric mood, hallucinations, self-injury, sleep disturbance and suicidal ideas. The patient is not nervous/anxious and is not hyperactive.         Pertinent medications noted:     Objective:      Blood pressure (!) 156/78, pulse 86, temperature 98 °F (36.7 °C), height 6' 1" (1.854 m), weight 113.7 kg (250 lb 9.6 oz), SpO2 98 %.  Body mass index is 33.06 kg/m².  Physical Exam  Constitutional:       General: He is not in acute distress.     Appearance: He is not diaphoretic.   HENT:      Head: Atraumatic.      Right Ear: External ear normal.      Left Ear: External ear normal.      Nose: Nose normal.   Eyes:      General: No scleral icterus.     Conjunctiva/sclera: Conjunctivae normal.      Pupils: Pupils are equal, round, and reactive to light.   Neck:      Vascular: No JVD.   Cardiovascular:      Rate and Rhythm: Normal rate and regular rhythm.      Heart sounds: Normal heart sounds.   Pulmonary:      Effort: Pulmonary effort is normal.      Breath sounds: Normal breath sounds. No wheezing or rales.   Chest:      Chest wall: No tenderness.   Abdominal:      General: Bowel sounds are normal. There is no distension.      Palpations: Abdomen is soft. There is no mass.      Tenderness: There is no abdominal tenderness. There is no guarding or " rebound.   Musculoskeletal:         General: Normal range of motion.      Cervical back: Normal range of motion and neck supple.   Lymphadenopathy:      Cervical: No cervical adenopathy.   Skin:     General: Skin is warm and dry.      Findings: No erythema or rash.   Neurological:      Mental Status: He is alert and oriented to person, place, and time.      Cranial Nerves: No cranial nerve deficit.   Psychiatric:         Behavior: Behavior normal.         Thought Content: Thought content normal.         VAD:                 02/14                                                                          General Labs reviewed:  Lab Results   Component Value Date    WBC 6.00 04/06/2022    RBC 4.20 (L) 04/06/2022    HGB 10.0 (L) 04/06/2022    HCT 33.7 (L) 04/06/2022    MCV 80 (L) 04/06/2022    MCH 23.8 (L) 04/06/2022    MCHC 29.7 (L) 04/06/2022    RDW 17.5 (H) 04/06/2022     (H) 04/06/2022    MPV 11.2 04/06/2022    GRAN 3.7 04/06/2022    GRAN 61.9 04/06/2022    LYMPH 1.6 04/06/2022    LYMPH 27.2 04/06/2022    MONO 0.4 04/06/2022    MONO 7.3 04/06/2022    EOS 0.1 04/06/2022    BASO 0.07 04/06/2022    EOSINOPHIL 2.2 04/06/2022    BASOPHIL 1.2 04/06/2022     CMP  Sodium   Date Value Ref Range Status   04/06/2022 140 136 - 145 mmol/L Final     Potassium   Date Value Ref Range Status   04/06/2022 5.3 (H) 3.5 - 5.1 mmol/L Final     Chloride   Date Value Ref Range Status   04/06/2022 106 95 - 110 mmol/L Final     CO2   Date Value Ref Range Status   04/06/2022 26 22 - 31 mmol/L Final     Glucose   Date Value Ref Range Status   04/06/2022 88 70 - 110 mg/dL Final     Comment:     The ADA recommends the following guidelines for fasting glucose:    Normal:       less than 100 mg/dL    Prediabetes:  100 mg/dL to 125 mg/dL    Diabetes:     126 mg/dL or higher       BUN   Date Value Ref Range Status   04/06/2022 26 (H) 9 - 21 mg/dL Final     Creatinine   Date Value Ref Range Status   04/06/2022 1.06 0.50 - 1.40 mg/dL Final      Calcium   Date Value Ref Range Status   04/06/2022 9.1 8.4 - 10.2 mg/dL Final     Total Protein   Date Value Ref Range Status   04/06/2022 7.3 6.0 - 8.4 g/dL Final     Albumin   Date Value Ref Range Status   04/06/2022 4.2 3.5 - 5.2 g/dL Final     Total Bilirubin   Date Value Ref Range Status   04/06/2022 0.3 0.2 - 1.3 mg/dL Final     Alkaline Phosphatase   Date Value Ref Range Status   04/06/2022 85 38 - 145 U/L Final     AST   Date Value Ref Range Status   04/06/2022 31 17 - 59 U/L Final     ALT   Date Value Ref Range Status   04/06/2022 22 0 - 50 U/L Final     Anion Gap   Date Value Ref Range Status   04/06/2022 8 8 - 16 mmol/L Final     eGFR if    Date Value Ref Range Status   04/06/2022 >60 >60 mL/min/1.73 m^2 Final     eGFR if non    Date Value Ref Range Status   04/06/2022 >60 >60 mL/min/1.73 m^2 Final     Comment:     Calculation used to obtain the estimated glomerular filtration  rate (eGFR) is the CKD-EPI equation.          Micro:  Microbiology Results (last 7 days)     ** No results found for the last 168 hours. **        Imaging Reviewed:    Assessment:       1. Osteomyelitis of great toe of left foot    2. Type 2 diabetes mellitus treated with insulin    3. Anemia, unspecified type    4. Protein-calorie malnutrition, unspecified severity     5. Obesity (BMI 30-39.9)           Plan:       Osteomyelitis of great toe of left foot    Type 2 diabetes mellitus treated with insulin    Anemia, unspecified type    Protein-calorie malnutrition, unspecified severity     Obesity (BMI 30-39.9)      No follow-ups on file.      Osteomyelitis of the left 1st metatarsal.  Organisms on 03/18/2022 are corynebacterium striatum and Staphylococcus Simulans  Will try Dalvance 04/04, 04/11 04/25  Continue doxycycline by mouth.  Discussed how to properly take it    This note was created using voice recognition software that occasionally misinterpreted phrases or words.

## 2022-04-07 NOTE — PATIENT INSTRUCTIONS
Your Potassium is 4.9--5.3 -- discuss with your cardiologist who gives you spironolactone and losartan    Continue wound care    Continue doxycycline

## 2022-04-08 ENCOUNTER — OFFICE VISIT (OUTPATIENT)
Dept: WOUND CARE | Facility: HOSPITAL | Age: 72
End: 2022-04-08
Attending: PODIATRIST
Payer: MEDICARE

## 2022-04-08 VITALS
RESPIRATION RATE: 16 BRPM | SYSTOLIC BLOOD PRESSURE: 130 MMHG | TEMPERATURE: 97 F | HEART RATE: 87 BPM | DIASTOLIC BLOOD PRESSURE: 75 MMHG

## 2022-04-08 DIAGNOSIS — L97.524 ULCER OF LEFT FOOT WITH NECROSIS OF BONE: Primary | ICD-10-CM

## 2022-04-08 DIAGNOSIS — E11.65 UNCONTROLLED TYPE 2 DIABETES MELLITUS WITH HYPERGLYCEMIA: ICD-10-CM

## 2022-04-08 DIAGNOSIS — M86.10 ACUTE OSTEOMYELITIS: ICD-10-CM

## 2022-04-08 DIAGNOSIS — E11.628 DIABETIC FOOT INFECTION: ICD-10-CM

## 2022-04-08 DIAGNOSIS — L97.513 ULCER OF RIGHT FOOT WITH NECROSIS OF MUSCLE: ICD-10-CM

## 2022-04-08 DIAGNOSIS — R09.89 DECREASED PEDAL PULSES: ICD-10-CM

## 2022-04-08 DIAGNOSIS — E66.9 OBESITY (BMI 30.0-34.9): ICD-10-CM

## 2022-04-08 DIAGNOSIS — L08.9 DIABETIC FOOT INFECTION: ICD-10-CM

## 2022-04-08 DIAGNOSIS — E11.9 COMPREHENSIVE DIABETIC FOOT EXAMINATION, TYPE 2 DM, ENCOUNTER FOR: ICD-10-CM

## 2022-04-08 DIAGNOSIS — Z89.432 HISTORY OF AMPUTATION OF LEFT FOOT: ICD-10-CM

## 2022-04-08 DIAGNOSIS — L97.522 ULCER OF LEFT FOOT WITH FAT LAYER EXPOSED: ICD-10-CM

## 2022-04-08 DIAGNOSIS — Z91.89 AT HIGH RISK FOR INADEQUATE NUTRITIONAL INTAKE: ICD-10-CM

## 2022-04-08 PROCEDURE — 11043 PR DEBRIDEMENT, SKIN, SUB-Q TISSUE,MUSCLE,=<20 SQ CM: ICD-10-PCS | Mod: ,,, | Performed by: PODIATRIST

## 2022-04-08 PROCEDURE — 11043 DBRDMT MUSC&/FSCA 1ST 20/<: CPT | Performed by: PODIATRIST

## 2022-04-08 PROCEDURE — 11043 DBRDMT MUSC&/FSCA 1ST 20/<: CPT | Mod: ,,, | Performed by: PODIATRIST

## 2022-04-08 PROCEDURE — 11042 DBRDMT SUBQ TIS 1ST 20SQCM/<: CPT | Mod: 59,,, | Performed by: PODIATRIST

## 2022-04-08 PROCEDURE — 11042 PR DEBRIDEMENT, SKIN, SUB-Q TISSUE,=<20 SQ CM: ICD-10-PCS | Mod: 59,,, | Performed by: PODIATRIST

## 2022-04-08 PROCEDURE — 99499 UNLISTED E&M SERVICE: CPT | Mod: ,,, | Performed by: PODIATRIST

## 2022-04-08 PROCEDURE — 11042 DBRDMT SUBQ TIS 1ST 20SQCM/<: CPT | Mod: 59 | Performed by: PODIATRIST

## 2022-04-08 PROCEDURE — 99499 NO LOS: ICD-10-PCS | Mod: ,,, | Performed by: PODIATRIST

## 2022-04-08 NOTE — PROGRESS NOTES
[address]  [place] [address]  Phone: [phone]   [phone]    Patient's PCP:Veena Stroud MD  Referring Provider: No ref. provider found    Subjective:      Chief Complaint:: Wound Care, Pressure Ulcer, Non-healing Wound, Wound Infection, Diabetes, and Diabetic Foot Ulcer    HPI   1. Debridement See Wound Docs for assessment of wounds and procedure notes  2. Continue taking all medications as prescribed, IV antibiotics per Infectious Disease  3. Continue home health dressing changes, wound VAC to left foot wounds.  Silver alginate with football wrap to right foot wound   4. RTC one week   5. Counseled patient on increasing protein intake, not getting wounds wet, keeping dressings clean dry and intact, following a healthy diet, elevating legs when able, removing pressure from wounds     Total time spent for E&M 30  Total time for debridement 35 minutes      Athlete's foot counseling: Counseled patient that it is important to keep the feet dry. Use absorbent cotton socks and change them if they become sweaty. Or wear an open-toe shoe or sandal. Wash the feet at least once a day with soap and water. Apply the antifungal cream as prescribed. Recommend spray antiperspirant to the feet. Some antifungal creams are available without a prescription. It may take a week before the rash starts to improve. It can take about 3 to 4 weeks to completely clear. Continue the medicine until the rash is all gone. Use over-the-counter antifungal powders or sprays on your feet after exposure to high-risk environments, such as public showers, gyms, and locker rooms. This can help prevent future infections. Wearing appropriate shoes in these situations can help.        Fungal infection of skin explained. Treatment options including no treatment, topical medications, oral medications were discussed, as well as success rates and risks of recurrence. We agreed on topical medication         Bandar Tomas is a  71 y.o. male  with past medical history of hypertension, hyperlipidemia, uncontrolled diabetes, diabetic neuropathy who presents to clinic with a complaint of left great toe ulcer status post amputation of left great toe lasting for approximately 2 weeks. Onset of symptoms was initially patient had a severe infected wound on the left foot and was admitted to the hospital.  See discharge summary below.  He underwent a left great toe amputation with keeping the amputation site open due to not having enough viable skin for closure.  A wound VAC was started.  Referral to wound care was placed.     Upon presentation today, patient also has a open wound on the plantar right foot.  Patient states he does not check his feet so is unsure how long this has been present.  Patient's daughter is bedside and states patient lives alone and is not compliant with his diabetic medication, checking his feet, and additional recommendations by his doctors.  Counseled patient on the need to be compliant.  Counseled patient that if he is not compliant he is risking limb loss as well.        Counseled patient regarding the need to get his blood sugars under control.  Discussed with patient that with blood sugars at this high level it is very difficult to heal wounds.        Discharge summary on 02/21/2022:  HPI:   Mr. Reyes is a 71-year-old male with a past medical history of hypertension, hyperlipidemia, insulin-dependent diabetes mellitus type 2, and obesity BMI 32 who presents today with complaints of a left toe infection.  It is severe.  It is associated with purulent drainage, malodor, and pain.  He denies fever, chills, nausea, vomiting, diarrhea, dizziness, chest pain, shortness of breath, loss of consciousness.  He has known injury to the toe.  He states he took off his today issues about 2 weeks ago and noticeable ulcerations status toe.  He went to the pharmacy and ask pharmacist for recommendations and was recommended triple  antibiotic ointment and soaks for the toe which he did with no improvement.  He does not have a podiatrist.  He states his glucoses have been out of control over the last 2 weeks foot due to this infection.  He does not have an endocrinologist.  X-ray of foot in the ED revealed: Osteomyelitis of great toe proximal and distal phalanges. Pathologic fracture of great toe distal phalanx  Procedure(s) (LRB):  AMPUTATION, TOE (Left)    Hospital Course:   Patient admitted with Diabetic ulcer of Left Great toe and associated acute Osteomyelitis  Pt underwent L great toe amputation  Later pt was discharged to home with HH /wound vac  Extensive education was provided regarding his Uncontrolled DM type 2            Systemic Doctor: Veena Stroud MD  Date Last Seen: about 4 months ago  Blood Sugar: doesn't check  Hemoglobin A1c: 16.4 (2/15/2022)        Patient should call the office immediately if any signs of infection, such as fever, chills, sweats, increased redness or pain.     Patient was instructed to call the clinic or go to the emergency department if their symptoms do not improve, worsens, or if new symptoms develop.  Patient was advised that if any increased swelling, pain, or numbness arise to go immediately to the ED. Patient knows to call any time if an emergency arises. Shared decision making occurred and patient verbalized understanding in agreement with this plan.         Counseling/Education:  I provided patient education verbally regarding:   The aspects of diabetes and how it pertains to the feet. I explained the importance of proper diabetic foot care and how it is essential for the health of their feet.     I discussed the importance of knowing their Hemoglobin A1c and that the level needs to be as close to 6 as possible. I discussed the increase complications of high blood sugar including stroke, blindness, heart attack, kidney failure and loss of limb secondary to neuropathy and PVD.      With  neuropathy, beware of any breaks in the skin or redness. These areas are not recognized early due to the numbness.     I discussed Diabetes, lower back issues, metabolic disorders, systemic causes, chemotherapy, vitamin deficiency, heavy metal exposure, as some of the causes. I also explained that as much as 40% of the time we can not find a cause. I discussed different treatments available to control the symptoms but which may not cure the problem.         Counseled patient on the aspects of diabetes and how it pertains to the feet.  I explained the importance of proper diabetic foot care and how it is essential for the health of their feet.        Shoe inspection. Patient instructed on proper foot hygeine. We discussed wearing proper shoe gear, daily foot inspections, never walking without protective shoe gear, never putting sharp instruments to feet, routine podiatric nail visits every 2-3 months.       I counseled the patient on their conditions, their implications and medical management.     >50% of this > 60 minute visit was spent face to face educating/counseling the patient     I spent a total of 60 minutes on the day of the visit.This includes face to face time and non-face to face time preparing to see the patient (eg, review of tests), obtaining and/or reviewing separately obtained history, documenting clinical information in the electronic or other health record, independently interpreting results and communicating results to the patient/family/caregiver, or care coordinator.        Much of the documentation for this visit was completed in the Wound Docs system.  Please see the attached documentation for further details about the patient's care. Scanned under the Media tab.         Kirsty Rodríguez DPM        Vitals:    04/08/22 0833   BP: 130/75   Pulse: 87   Resp: 16   Temp: 96.5 °F (35.8 °C)   TempSrc: Skin   PainSc: 0-No pain      Shoe Size:     Past Surgical History:   Procedure Laterality Date     COLONOSCOPY  prior to 2007    COLONOSCOPY N/A 3/31/2016    Procedure: COLONOSCOPY;  Surgeon: Perry Rodriguez MD;  [facility];  Service: Endoscopy;  Laterality: N/A;    SHOULDER SURGERY Left     TOE AMPUTATION Left 2/17/2022    Procedure: AMPUTATION, TOE;  Surgeon: Jesse Rodríguez DPM;  Location: [facility] OR;  Service: Podiatry;  Laterality: Left;    UPPER GASTROINTESTINAL ENDOSCOPY       Past Medical History:   Diagnosis Date    Diabetes     Former smoker     HTN (hypertension)      Family History   Problem Relation Age of Onset    Brain cancer Brother 54    Esophageal cancer Paternal Grandfather 87    Colon polyps Neg Hx         Social History:   Marital Status:   Alcohol History:  reports current alcohol use.  Tobacco History:  reports that he has quit smoking. He has never used smokeless tobacco.  Drug History:  reports no history of drug use.    Review of patient's allergies indicates:  No Known Allergies    Current Outpatient Medications   Medication Sig Dispense Refill    aspirin 81 MG Chew Take 81 mg by mouth once daily.      blood sugar diagnostic Strp 100 each.  Supply whatever insurance covers. 100 each 11    dalbavancin (DALVANCE) 500 mg Soln injection Inject 75 mLs (1,500 mg total) into the vein every 7 days. for 3 doses 225 mL 0    doxycycline (MONODOX) 100 MG capsule Take 1 capsule (100 mg total) by mouth 2 (two) times daily. 120 capsule 0    hydroCHLOROthiazide (HYDRODIURIL) 25 MG tablet Take 25 mg by mouth every morning.      HYDROcodone-acetaminophen (NORCO) 5-325 mg per tablet Take 1 tablet by mouth every 6 (six) hours as needed for Pain. 30 tablet 0    insulin  unit/mL injection Inject 25 Units into the skin 2 (two) times daily before meals.      insulin regular 100 unit/mL Inj injection Inject 25 Units into the skin 2 (two) times daily before meals.      losartan (COZAAR) 100 MG tablet Take 100 mg by mouth once daily.      pravastatin (PRAVACHOL) 40 MG  tablet Take 40 mg by mouth once daily.      spironolactone (ALDACTONE) 50 MG tablet Take 50 mg by mouth once daily.       No current facility-administered medications for this visit.       Review of Systems   Constitutional: Negative for chills, fatigue, fever and unexpected weight change.   HENT: Negative for hearing loss and trouble swallowing.    Eyes: Negative for photophobia and visual disturbance.   Respiratory: Negative for cough, shortness of breath and wheezing.    Cardiovascular: Negative for chest pain, palpitations and leg swelling.   Gastrointestinal: Negative for abdominal pain and nausea.   Genitourinary: Negative for dysuria and frequency.   Musculoskeletal: Negative for arthralgias, back pain, gait problem, joint swelling and myalgias.   Skin: Positive for wound. Negative for rash.   Neurological: Negative for tremors, seizures, weakness, numbness and headaches.   Hematological: Does not bruise/bleed easily.         Objective:        Physical Exam:   Foot Exam  Physical Exam  Ortho/SPM Exam     Imaging:            Assessment:       1. Ulcer of left foot with necrosis of bone    2. Ulcer of right foot with necrosis of muscle    3. Ulcer of left foot with fat layer exposed    4. Uncontrolled type 2 diabetes mellitus with hyperglycemia    5. Obesity (BMI 30.0-34.9)    6. History of amputation of left foot    7. Decreased pedal pulses    8. Comprehensive diabetic foot examination, type 2 DM, encounter for    9. At high risk for inadequate nutritional intake    10. Diabetic foot infection    11. Acute osteomyelitis      Plan:   Ulcer of left foot with necrosis of bone    Ulcer of right foot with necrosis of muscle    Ulcer of left foot with fat layer exposed    Uncontrolled type 2 diabetes mellitus with hyperglycemia    Obesity (BMI 30.0-34.9)    History of amputation of left foot    Decreased pedal pulses    Comprehensive diabetic foot examination, type 2 DM, encounter for    At high risk for  inadequate nutritional intake    Diabetic foot infection    Acute osteomyelitis      Follow up in about 2 weeks (around 4/22/2022).    Procedures          Counseling:     I provided patient education verbally regarding:   Patient diagnosis, treatment options, as well as alternatives, risks, and benefits.     This note was created using Dragon voice recognition software that occasionally misinterpreted phrases or words.

## 2022-04-11 ENCOUNTER — HOSPITAL ENCOUNTER (OUTPATIENT)
Dept: RADIOLOGY | Facility: HOSPITAL | Age: 72
Discharge: HOME | End: 2022-04-11
Attending: INTERNAL MEDICINE
Payer: MEDICARE

## 2022-04-11 ENCOUNTER — INFUSION (OUTPATIENT)
Dept: INFUSION THERAPY | Facility: HOSPITAL | Age: 72
End: 2022-04-11
Attending: INTERNAL MEDICINE
Payer: MEDICARE

## 2022-04-11 VITALS
RESPIRATION RATE: 16 BRPM | HEART RATE: 8 BPM | TEMPERATURE: 98 F | DIASTOLIC BLOOD PRESSURE: 74 MMHG | SYSTOLIC BLOOD PRESSURE: 136 MMHG | OXYGEN SATURATION: 98 %

## 2022-04-11 DIAGNOSIS — E08.621 DIABETIC ULCER OF LEFT MIDFOOT ASSOCIATED WITH DIABETES MELLITUS DUE TO UNDERLYING CONDITION, LIMITED TO BREAKDOWN OF SKIN: Primary | ICD-10-CM

## 2022-04-11 DIAGNOSIS — L97.421 DIABETIC ULCER OF LEFT MIDFOOT ASSOCIATED WITH DIABETES MELLITUS DUE TO UNDERLYING CONDITION, LIMITED TO BREAKDOWN OF SKIN: Primary | ICD-10-CM

## 2022-04-11 DIAGNOSIS — M86.9 OSTEOMYELITIS OF GREAT TOE OF LEFT FOOT: ICD-10-CM

## 2022-04-11 PROCEDURE — 96365 THER/PROPH/DIAG IV INF INIT: CPT

## 2022-04-11 PROCEDURE — 93925 LOWER EXTREMITY STUDY: CPT | Mod: TC

## 2022-04-11 PROCEDURE — 25000003 PHARM REV CODE 250: Performed by: INTERNAL MEDICINE

## 2022-04-11 PROCEDURE — 63600175 PHARM REV CODE 636 W HCPCS: Mod: JG | Performed by: INTERNAL MEDICINE

## 2022-04-11 RX ADMIN — DALBAVANCIN 1500 MG: 500 INJECTION, POWDER, FOR SOLUTION INTRAVENOUS at 08:04

## 2022-04-12 ENCOUNTER — LAB VISIT (OUTPATIENT)
Dept: LAB | Facility: HOSPITAL | Age: 72
End: 2022-04-12
Attending: INTERNAL MEDICINE
Payer: MEDICARE

## 2022-04-12 DIAGNOSIS — M86.172 ACUTE OSTEOMYELITIS OF LEFT ANKLE OR FOOT: ICD-10-CM

## 2022-04-12 DIAGNOSIS — E11.51 TYPE II DIABETES MELLITUS WITH PERIPHERAL CIRCULATORY DISORDER: Primary | ICD-10-CM

## 2022-04-12 LAB
ALBUMIN SERPL BCP-MCNC: 3.7 G/DL (ref 3.5–5.2)
ALP SERPL-CCNC: 85 U/L (ref 55–135)
ALT SERPL W/O P-5'-P-CCNC: 19 U/L (ref 10–44)
ANION GAP SERPL CALC-SCNC: 11 MMOL/L (ref 8–16)
AST SERPL-CCNC: 17 U/L (ref 10–40)
BASOPHILS # BLD AUTO: 0.07 K/UL (ref 0–0.2)
BASOPHILS NFR BLD: 1.2 % (ref 0–1.9)
BILIRUB SERPL-MCNC: 0.2 MG/DL (ref 0.1–1)
BUN SERPL-MCNC: 25 MG/DL (ref 8–23)
CALCIUM SERPL-MCNC: 9.1 MG/DL (ref 8.7–10.5)
CHLORIDE SERPL-SCNC: 104 MMOL/L (ref 95–110)
CO2 SERPL-SCNC: 23 MMOL/L (ref 23–29)
CREAT SERPL-MCNC: 1.2 MG/DL (ref 0.5–1.4)
CRP SERPL-MCNC: 3.6 MG/L (ref 0–8.2)
DIFFERENTIAL METHOD: ABNORMAL
EOSINOPHIL # BLD AUTO: 0.2 K/UL (ref 0–0.5)
EOSINOPHIL NFR BLD: 2.7 % (ref 0–8)
ERYTHROCYTE [DISTWIDTH] IN BLOOD BY AUTOMATED COUNT: 17.5 % (ref 11.5–14.5)
ERYTHROCYTE [SEDIMENTATION RATE] IN BLOOD BY WESTERGREN METHOD: 40 MM/HR (ref 0–10)
EST. GFR  (AFRICAN AMERICAN): >60 ML/MIN/1.73 M^2
EST. GFR  (NON AFRICAN AMERICAN): >60 ML/MIN/1.73 M^2
GLUCOSE SERPL-MCNC: 247 MG/DL (ref 70–110)
HCT VFR BLD AUTO: 33.8 % (ref 40–54)
HGB BLD-MCNC: 10 G/DL (ref 14–18)
IMM GRANULOCYTES # BLD AUTO: 0.01 K/UL (ref 0–0.04)
IMM GRANULOCYTES NFR BLD AUTO: 0.2 % (ref 0–0.5)
LYMPHOCYTES # BLD AUTO: 1.6 K/UL (ref 1–4.8)
LYMPHOCYTES NFR BLD: 26.2 % (ref 18–48)
MCH RBC QN AUTO: 24.1 PG (ref 27–31)
MCHC RBC AUTO-ENTMCNC: 29.6 G/DL (ref 32–36)
MCV RBC AUTO: 81 FL (ref 82–98)
MONOCYTES # BLD AUTO: 0.5 K/UL (ref 0.3–1)
MONOCYTES NFR BLD: 8.7 % (ref 4–15)
NEUTROPHILS # BLD AUTO: 3.6 K/UL (ref 1.8–7.7)
NEUTROPHILS NFR BLD: 61 % (ref 38–73)
NRBC BLD-RTO: 0 /100 WBC
PLATELET # BLD AUTO: 444 K/UL (ref 150–450)
PMV BLD AUTO: 10.6 FL (ref 9.2–12.9)
POTASSIUM SERPL-SCNC: 5.5 MMOL/L (ref 3.5–5.1)
PROT SERPL-MCNC: 7.3 G/DL (ref 6–8.4)
RBC # BLD AUTO: 4.15 M/UL (ref 4.6–6.2)
SODIUM SERPL-SCNC: 138 MMOL/L (ref 136–145)
WBC # BLD AUTO: 5.96 K/UL (ref 3.9–12.7)

## 2022-04-12 PROCEDURE — 86140 C-REACTIVE PROTEIN: CPT | Performed by: INTERNAL MEDICINE

## 2022-04-12 PROCEDURE — 80053 COMPREHEN METABOLIC PANEL: CPT | Performed by: INTERNAL MEDICINE

## 2022-04-12 PROCEDURE — 85025 COMPLETE CBC W/AUTO DIFF WBC: CPT | Performed by: INTERNAL MEDICINE

## 2022-04-12 PROCEDURE — 85651 RBC SED RATE NONAUTOMATED: CPT | Performed by: INTERNAL MEDICINE

## 2022-04-12 PROCEDURE — 36415 COLL VENOUS BLD VENIPUNCTURE: CPT | Performed by: INTERNAL MEDICINE

## 2022-04-18 ENCOUNTER — LAB VISIT (OUTPATIENT)
Dept: LAB | Facility: HOSPITAL | Age: 72
End: 2022-04-18
Attending: INTERNAL MEDICINE
Payer: MEDICARE

## 2022-04-18 DIAGNOSIS — I10 ESSENTIAL HYPERTENSION, BENIGN: ICD-10-CM

## 2022-04-18 DIAGNOSIS — Z47.81 AFTERCARE FOR AMPUTATION STUMP: ICD-10-CM

## 2022-04-18 DIAGNOSIS — E11.51 TYPE II DIABETES MELLITUS WITH PERIPHERAL CIRCULATORY DISORDER: Primary | ICD-10-CM

## 2022-04-18 LAB
ALBUMIN SERPL BCP-MCNC: 3.9 G/DL (ref 3.5–5.2)
ALP SERPL-CCNC: 90 U/L (ref 55–135)
ALT SERPL W/O P-5'-P-CCNC: 21 U/L (ref 10–44)
ANION GAP SERPL CALC-SCNC: 11 MMOL/L (ref 8–16)
AST SERPL-CCNC: 19 U/L (ref 10–40)
BASOPHILS # BLD AUTO: 0.08 K/UL (ref 0–0.2)
BASOPHILS NFR BLD: 1.3 % (ref 0–1.9)
BILIRUB SERPL-MCNC: 0.2 MG/DL (ref 0.1–1)
BUN SERPL-MCNC: 25 MG/DL (ref 8–23)
CALCIUM SERPL-MCNC: 9.2 MG/DL (ref 8.7–10.5)
CHLORIDE SERPL-SCNC: 101 MMOL/L (ref 95–110)
CO2 SERPL-SCNC: 25 MMOL/L (ref 23–29)
CREAT SERPL-MCNC: 1.4 MG/DL (ref 0.5–1.4)
CRP SERPL-MCNC: 2.4 MG/L (ref 0–8.2)
DIFFERENTIAL METHOD: ABNORMAL
EOSINOPHIL # BLD AUTO: 0.2 K/UL (ref 0–0.5)
EOSINOPHIL NFR BLD: 2.7 % (ref 0–8)
ERYTHROCYTE [DISTWIDTH] IN BLOOD BY AUTOMATED COUNT: 17.7 % (ref 11.5–14.5)
ERYTHROCYTE [SEDIMENTATION RATE] IN BLOOD BY WESTERGREN METHOD: 19 MM/HR (ref 0–10)
EST. GFR  (AFRICAN AMERICAN): 59 ML/MIN/1.73 M^2
EST. GFR  (NON AFRICAN AMERICAN): 51 ML/MIN/1.73 M^2
GLUCOSE SERPL-MCNC: 268 MG/DL (ref 70–110)
HCT VFR BLD AUTO: 35.5 % (ref 40–54)
HGB BLD-MCNC: 10.6 G/DL (ref 14–18)
IMM GRANULOCYTES # BLD AUTO: 0.01 K/UL (ref 0–0.04)
IMM GRANULOCYTES NFR BLD AUTO: 0.2 % (ref 0–0.5)
LYMPHOCYTES # BLD AUTO: 1.9 K/UL (ref 1–4.8)
LYMPHOCYTES NFR BLD: 29.7 % (ref 18–48)
MCH RBC QN AUTO: 24 PG (ref 27–31)
MCHC RBC AUTO-ENTMCNC: 29.9 G/DL (ref 32–36)
MCV RBC AUTO: 81 FL (ref 82–98)
MONOCYTES # BLD AUTO: 0.5 K/UL (ref 0.3–1)
MONOCYTES NFR BLD: 7.8 % (ref 4–15)
NEUTROPHILS # BLD AUTO: 3.7 K/UL (ref 1.8–7.7)
NEUTROPHILS NFR BLD: 58.3 % (ref 38–73)
NRBC BLD-RTO: 0 /100 WBC
PLATELET # BLD AUTO: 453 K/UL (ref 150–450)
PMV BLD AUTO: 10.5 FL (ref 9.2–12.9)
POTASSIUM SERPL-SCNC: 5.9 MMOL/L (ref 3.5–5.1)
PROT SERPL-MCNC: 7.6 G/DL (ref 6–8.4)
RBC # BLD AUTO: 4.41 M/UL (ref 4.6–6.2)
SODIUM SERPL-SCNC: 137 MMOL/L (ref 136–145)
WBC # BLD AUTO: 6.3 K/UL (ref 3.9–12.7)

## 2022-04-18 PROCEDURE — 36415 COLL VENOUS BLD VENIPUNCTURE: CPT | Performed by: INTERNAL MEDICINE

## 2022-04-18 PROCEDURE — 80053 COMPREHEN METABOLIC PANEL: CPT | Performed by: INTERNAL MEDICINE

## 2022-04-18 PROCEDURE — 85651 RBC SED RATE NONAUTOMATED: CPT | Performed by: INTERNAL MEDICINE

## 2022-04-18 PROCEDURE — 86140 C-REACTIVE PROTEIN: CPT | Performed by: INTERNAL MEDICINE

## 2022-04-18 PROCEDURE — 85025 COMPLETE CBC W/AUTO DIFF WBC: CPT | Performed by: INTERNAL MEDICINE

## 2022-04-20 ENCOUNTER — TELEPHONE (OUTPATIENT)
Dept: INFECTIOUS DISEASES | Facility: CLINIC | Age: 72
End: 2022-04-20

## 2022-04-20 DIAGNOSIS — E87.5 HYPERKALEMIA: Primary | ICD-10-CM

## 2022-04-20 NOTE — TELEPHONE ENCOUNTER
Reviewed weekly lab and potassium has gone from 5.3 to 5.5 and now 5.9  I called Mr. Tomas and tried to confirm that he is on spironolactone but he could not confirm. I advised that this medication can cause high potassium (so can the losartan). I asked that he stop this medication.   I tried to reach Dr. Stroud his primary but I do not have a good number for her.   Tried [phone] and left a message  Sent subsequent home health orders to [facility] to hold spironolactone and repeat BMP on Friday 4/22 run stat and call results.     4/25: repeat potassium on 4/22 was called to me then and was 4.5. HH RN advised that he will stay off of the aldactone. I have not had a return call from his PCP/

## 2022-04-22 ENCOUNTER — LAB VISIT (OUTPATIENT)
Dept: LAB | Facility: HOSPITAL | Age: 72
End: 2022-04-22
Attending: INTERNAL MEDICINE
Payer: MEDICARE

## 2022-04-22 ENCOUNTER — OFFICE VISIT (OUTPATIENT)
Dept: WOUND CARE | Facility: HOSPITAL | Age: 72
End: 2022-04-22
Attending: PODIATRIST
Payer: MEDICARE

## 2022-04-22 VITALS
RESPIRATION RATE: 18 BRPM | DIASTOLIC BLOOD PRESSURE: 86 MMHG | HEART RATE: 90 BPM | TEMPERATURE: 98 F | SYSTOLIC BLOOD PRESSURE: 147 MMHG

## 2022-04-22 DIAGNOSIS — L97.522 ULCER OF LEFT FOOT WITH FAT LAYER EXPOSED: ICD-10-CM

## 2022-04-22 DIAGNOSIS — L08.9 DIABETIC FOOT INFECTION: ICD-10-CM

## 2022-04-22 DIAGNOSIS — M86.10 ACUTE OSTEOMYELITIS: ICD-10-CM

## 2022-04-22 DIAGNOSIS — Z91.89 AT HIGH RISK FOR INADEQUATE NUTRITIONAL INTAKE: ICD-10-CM

## 2022-04-22 DIAGNOSIS — R09.89 DECREASED PEDAL PULSES: ICD-10-CM

## 2022-04-22 DIAGNOSIS — E11.628 DIABETIC FOOT INFECTION: ICD-10-CM

## 2022-04-22 DIAGNOSIS — E66.9 OBESITY (BMI 30.0-34.9): ICD-10-CM

## 2022-04-22 DIAGNOSIS — Z89.432 HISTORY OF AMPUTATION OF LEFT FOOT: ICD-10-CM

## 2022-04-22 DIAGNOSIS — E87.5 HYPERKALEMIA: Primary | ICD-10-CM

## 2022-04-22 DIAGNOSIS — E11.65 UNCONTROLLED TYPE 2 DIABETES MELLITUS WITH HYPERGLYCEMIA: ICD-10-CM

## 2022-04-22 DIAGNOSIS — L97.524 ULCER OF LEFT FOOT WITH NECROSIS OF BONE: Primary | ICD-10-CM

## 2022-04-22 DIAGNOSIS — L97.513 ULCER OF RIGHT FOOT WITH NECROSIS OF MUSCLE: ICD-10-CM

## 2022-04-22 LAB
ANION GAP SERPL CALC-SCNC: 10 MMOL/L (ref 8–16)
BUN SERPL-MCNC: 23 MG/DL (ref 8–23)
CALCIUM SERPL-MCNC: 9.5 MG/DL (ref 8.7–10.5)
CHLORIDE SERPL-SCNC: 104 MMOL/L (ref 95–110)
CO2 SERPL-SCNC: 25 MMOL/L (ref 23–29)
CREAT SERPL-MCNC: 1.4 MG/DL (ref 0.5–1.4)
EST. GFR  (AFRICAN AMERICAN): 58.8 ML/MIN/1.73 M^2
EST. GFR  (NON AFRICAN AMERICAN): 50.9 ML/MIN/1.73 M^2
GLUCOSE SERPL-MCNC: 153 MG/DL (ref 70–110)
POTASSIUM SERPL-SCNC: 4.5 MMOL/L (ref 3.5–5.1)
SODIUM SERPL-SCNC: 139 MMOL/L (ref 136–145)

## 2022-04-22 PROCEDURE — 80048 BASIC METABOLIC PNL TOTAL CA: CPT | Performed by: INTERNAL MEDICINE

## 2022-04-22 PROCEDURE — 99499 NO LOS: ICD-10-PCS | Mod: ,,, | Performed by: PODIATRIST

## 2022-04-22 PROCEDURE — 11043 PR DEBRIDEMENT, SKIN, SUB-Q TISSUE,MUSCLE,=<20 SQ CM: ICD-10-PCS | Mod: ,,, | Performed by: PODIATRIST

## 2022-04-22 PROCEDURE — 36415 COLL VENOUS BLD VENIPUNCTURE: CPT | Performed by: INTERNAL MEDICINE

## 2022-04-22 PROCEDURE — 11043 DBRDMT MUSC&/FSCA 1ST 20/<: CPT | Performed by: PODIATRIST

## 2022-04-22 PROCEDURE — 11043 DBRDMT MUSC&/FSCA 1ST 20/<: CPT | Mod: ,,, | Performed by: PODIATRIST

## 2022-04-22 PROCEDURE — 11042 DBRDMT SUBQ TIS 1ST 20SQCM/<: CPT | Mod: 59 | Performed by: PODIATRIST

## 2022-04-22 PROCEDURE — 99499 UNLISTED E&M SERVICE: CPT | Mod: ,,, | Performed by: PODIATRIST

## 2022-04-22 PROCEDURE — 11042 PR DEBRIDEMENT, SKIN, SUB-Q TISSUE,=<20 SQ CM: ICD-10-PCS | Mod: 59,,, | Performed by: PODIATRIST

## 2022-04-22 PROCEDURE — 11042 DBRDMT SUBQ TIS 1ST 20SQCM/<: CPT | Mod: 59,,, | Performed by: PODIATRIST

## 2022-04-22 NOTE — PROGRESS NOTES
[address] [facility] [address]  [place] [address]  Phone: [phone]   [phone]    Patient's PCP:Veena Stroud MD  Referring Provider: No ref. provider found    Subjective:      Chief Complaint:: Wound Care, Diabetes Mellitus, Foot Ulcer, Diabetic Foot Ulcer, Pressure Ulcer, and Wound Infection    HPI  1. Debridement See Wound Docs for assessment of wounds and procedure notes  2. Continue taking all medications as prescribed, IV antibiotics per Infectious Disease  3. Continue home health dressing changes, wound VAC to left foot wounds.  Silver alginate with football wrap to right foot wound  4. RTC one week   5. Counseled patient on increasing protein intake, not getting wounds wet, keeping dressings clean dry and intact, following a healthy diet, elevating legs when able, removing pressure from wounds     Total time spent for E&M 30  Total time for debridement 35 minutes      Athlete's foot counseling: Counseled patient that it is important to keep the feet dry. Use absorbent cotton socks and change them if they become sweaty. Or wear an open-toe shoe or sandal. Wash the feet at least once a day with soap and water. Apply the antifungal cream as prescribed. Recommend spray antiperspirant to the feet. Some antifungal creams are available without a prescription. It may take a week before the rash starts to improve. It can take about 3 to 4 weeks to completely clear. Continue the medicine until the rash is all gone. Use over-the-counter antifungal powders or sprays on your feet after exposure to high-risk environments, such as public showers, gyms, and locker rooms. This can help prevent future infections. Wearing appropriate shoes in these situations can help.        Fungal infection of skin explained. Treatment options including no treatment, topical medications, oral medications were discussed, as well as success rates and risks of recurrence. We agreed on topical medication         Bandar Tomas is a  71 y.o. male  with past medical history of hypertension, hyperlipidemia, uncontrolled diabetes, diabetic neuropathy who presents to clinic with a complaint of left great toe ulcer status post amputation of left great toe lasting for approximately 2 weeks. Onset of symptoms was initially patient had a severe infected wound on the left foot and was admitted to the hospital.  See discharge summary below.  He underwent a left great toe amputation with keeping the amputation site open due to not having enough viable skin for closure.  A wound VAC was started.  Referral to wound care was placed.     Upon presentation today, patient also has a open wound on the plantar right foot.  Patient states he does not check his feet so is unsure how long this has been present.  Patient's daughter is bedside and states patient lives alone and is not compliant with his diabetic medication, checking his feet, and additional recommendations by his doctors.  Counseled patient on the need to be compliant.  Counseled patient that if he is not compliant he is risking limb loss as well.        Counseled patient regarding the need to get his blood sugars under control.  Discussed with patient that with blood sugars at this high level it is very difficult to heal wounds.        Discharge summary on 02/21/2022:  HPI:   Mr. Reyes is a 71-year-old male with a past medical history of hypertension, hyperlipidemia, insulin-dependent diabetes mellitus type 2, and obesity BMI 32 who presents today with complaints of a left toe infection.  It is severe.  It is associated with purulent drainage, malodor, and pain.  He denies fever, chills, nausea, vomiting, diarrhea, dizziness, chest pain, shortness of breath, loss of consciousness.  He has known injury to the toe.  He states he took off his today issues about 2 weeks ago and noticeable ulcerations status toe.  He went to the pharmacy and ask pharmacist for recommendations and was recommended triple  antibiotic ointment and soaks for the toe which he did with no improvement.  He does not have a podiatrist.  He states his glucoses have been out of control over the last 2 weeks foot due to this infection.  He does not have an endocrinologist.  X-ray of foot in the ED revealed: Osteomyelitis of great toe proximal and distal phalanges. Pathologic fracture of great toe distal phalanx  Procedure(s) (LRB):  AMPUTATION, TOE (Left)    Hospital Course:   Patient admitted with Diabetic ulcer of Left Great toe and associated acute Osteomyelitis  Pt underwent L great toe amputation  Later pt was discharged to home with HH /wound vac  Extensive education was provided regarding his Uncontrolled DM type 2            Systemic Doctor: Veena Stroud MD  Date Last Seen: about 4 months ago  Blood Sugar: doesn't check  Hemoglobin A1c: 16.4 (2/15/2022)        Patient should call the office immediately if any signs of infection, such as fever, chills, sweats, increased redness or pain.     Patient was instructed to call the clinic or go to the emergency department if their symptoms do not improve, worsens, or if new symptoms develop.  Patient was advised that if any increased swelling, pain, or numbness arise to go immediately to the ED. Patient knows to call any time if an emergency arises. Shared decision making occurred and patient verbalized understanding in agreement with this plan.         Counseling/Education:  I provided patient education verbally regarding:   The aspects of diabetes and how it pertains to the feet. I explained the importance of proper diabetic foot care and how it is essential for the health of their feet.     I discussed the importance of knowing their Hemoglobin A1c and that the level needs to be as close to 6 as possible. I discussed the increase complications of high blood sugar including stroke, blindness, heart attack, kidney failure and loss of limb secondary to neuropathy and PVD.      With  neuropathy, beware of any breaks in the skin or redness. These areas are not recognized early due to the numbness.     I discussed Diabetes, lower back issues, metabolic disorders, systemic causes, chemotherapy, vitamin deficiency, heavy metal exposure, as some of the causes. I also explained that as much as 40% of the time we can not find a cause. I discussed different treatments available to control the symptoms but which may not cure the problem.         Counseled patient on the aspects of diabetes and how it pertains to the feet.  I explained the importance of proper diabetic foot care and how it is essential for the health of their feet.        Shoe inspection. Patient instructed on proper foot hygeine. We discussed wearing proper shoe gear, daily foot inspections, never walking without protective shoe gear, never putting sharp instruments to feet, routine podiatric nail visits every 2-3 months.       I counseled the patient on their conditions, their implications and medical management.     >50% of this > 60 minute visit was spent face to face educating/counseling the patient     I spent a total of 60 minutes on the day of the visit.This includes face to face time and non-face to face time preparing to see the patient (eg, review of tests), obtaining and/or reviewing separately obtained history, documenting clinical information in the electronic or other health record, independently interpreting results and communicating results to the patient/family/caregiver, or care coordinator.        Much of the documentation for this visit was completed in the Wound Docs system.  Please see the attached documentation for further details about the patient's care. Scanned under the Media tab.         Kirsty Rodríguez DPM        Vitals:    04/22/22 0808   BP: (!) 147/86   Pulse: 90   Resp: 18   Temp: 97.7 °F (36.5 °C)   TempSrc: Skin   PainSc: 0-No pain      Shoe Size:     Past Surgical History:   Procedure Laterality Date     COLONOSCOPY  prior to 2007    COLONOSCOPY N/A 3/31/2016    Procedure: COLONOSCOPY;  Surgeon: Perry Rodriguez MD;  Location: [facility];  Service: Endoscopy;  Laterality: N/A;    SHOULDER SURGERY Left     TOE AMPUTATION Left 2/17/2022    Procedure: AMPUTATION, TOE;  Surgeon: Jesse Rodríguez DPM;  Location: [facility];  Service: Podiatry;  Laterality: Left;    UPPER GASTROINTESTINAL ENDOSCOPY       Past Medical History:   Diagnosis Date    Diabetes     Former smoker     HTN (hypertension)      Family History   Problem Relation Age of Onset    Brain cancer Brother 54    Esophageal cancer Paternal Grandfather 87    Colon polyps Neg Hx         Social History:   Marital Status:   Alcohol History:  reports current alcohol use.  Tobacco History:  reports that he has quit smoking. He has never used smokeless tobacco.  Drug History:  reports no history of drug use.    Review of patient's allergies indicates:  No Known Allergies    Current Outpatient Medications   Medication Sig Dispense Refill    aspirin 81 MG Chew Take 81 mg by mouth once daily.      blood sugar diagnostic Strp 100 each.  Supply whatever insurance covers. 100 each 11    doxycycline (MONODOX) 100 MG capsule Take 1 capsule (100 mg total) by mouth 2 (two) times daily. 120 capsule 0    hydroCHLOROthiazide (HYDRODIURIL) 25 MG tablet Take 25 mg by mouth every morning.      HYDROcodone-acetaminophen (NORCO) 5-325 mg per tablet Take 1 tablet by mouth every 6 (six) hours as needed for Pain. 30 tablet 0    insulin  unit/mL injection Inject 25 Units into the skin 2 (two) times daily before meals.      insulin regular 100 unit/mL Inj injection Inject 25 Units into the skin 2 (two) times daily before meals.      losartan (COZAAR) 100 MG tablet Take 100 mg by mouth once daily.      pravastatin (PRAVACHOL) 40 MG tablet Take 40 mg by mouth once daily.      spironolactone (ALDACTONE) 50 MG tablet Take 50 mg by mouth once daily.       No  current facility-administered medications for this visit.       Review of Systems   Constitutional: Negative for chills, fatigue, fever and unexpected weight change.   HENT: Negative for hearing loss and trouble swallowing.    Eyes: Negative for photophobia and visual disturbance.   Respiratory: Negative for cough, shortness of breath and wheezing.    Cardiovascular: Negative for chest pain, palpitations and leg swelling.   Gastrointestinal: Negative for abdominal pain and nausea.   Genitourinary: Negative for dysuria and frequency.   Musculoskeletal: Negative for arthralgias, back pain, gait problem, joint swelling and myalgias.   Skin: Positive for wound. Negative for rash.   Neurological: Negative for tremors, seizures, weakness, numbness and headaches.   Hematological: Does not bruise/bleed easily.         Objective:        Physical Exam:   Foot Exam  Physical Exam  Ortho/SPM Exam     Imaging:            Assessment:       1. Ulcer of left foot with necrosis of bone    2. Ulcer of left foot with fat layer exposed    3. Ulcer of right foot with necrosis of muscle    4. Uncontrolled type 2 diabetes mellitus with hyperglycemia    5. Obesity (BMI 30.0-34.9)    6. History of amputation of left foot    7. Decreased pedal pulses    8. At high risk for inadequate nutritional intake    9. Diabetic foot infection    10. Acute osteomyelitis      Plan:   Ulcer of left foot with necrosis of bone    Ulcer of left foot with fat layer exposed    Ulcer of right foot with necrosis of muscle    Uncontrolled type 2 diabetes mellitus with hyperglycemia    Obesity (BMI 30.0-34.9)    History of amputation of left foot    Decreased pedal pulses    At high risk for inadequate nutritional intake    Diabetic foot infection    Acute osteomyelitis      Follow up in about 2 weeks (around 5/6/2022).    Procedures          Counseling:     I provided patient education verbally regarding:   Patient diagnosis, treatment options, as well as  alternatives, risks, and benefits.     This note was created using Dragon voice recognition software that occasionally misinterpreted phrases or words.

## 2022-04-28 ENCOUNTER — OFFICE VISIT (OUTPATIENT)
Dept: INFECTIOUS DISEASES | Facility: CLINIC | Age: 72
End: 2022-04-28
Payer: MEDICARE

## 2022-04-28 VITALS
TEMPERATURE: 98 F | OXYGEN SATURATION: 96 % | WEIGHT: 255 LBS | HEIGHT: 73 IN | DIASTOLIC BLOOD PRESSURE: 66 MMHG | HEART RATE: 80 BPM | BODY MASS INDEX: 33.8 KG/M2 | SYSTOLIC BLOOD PRESSURE: 144 MMHG

## 2022-04-28 DIAGNOSIS — R06.00 DYSPNEA, UNSPECIFIED TYPE: ICD-10-CM

## 2022-04-28 DIAGNOSIS — I73.9 PERIPHERAL ARTERIAL DISEASE: ICD-10-CM

## 2022-04-28 DIAGNOSIS — Z87.891 FORMER SMOKER: ICD-10-CM

## 2022-04-28 DIAGNOSIS — E11.69 DIABETES MELLITUS TYPE 2 IN OBESE: ICD-10-CM

## 2022-04-28 DIAGNOSIS — M86.9 OSTEOMYELITIS OF GREAT TOE OF LEFT FOOT: Primary | ICD-10-CM

## 2022-04-28 DIAGNOSIS — E66.9 OBESITY (BMI 30.0-34.9): ICD-10-CM

## 2022-04-28 DIAGNOSIS — G47.30 SLEEP APNEA, UNSPECIFIED TYPE: ICD-10-CM

## 2022-04-28 DIAGNOSIS — E66.9 DIABETES MELLITUS TYPE 2 IN OBESE: ICD-10-CM

## 2022-04-28 DIAGNOSIS — E87.5 HYPERKALEMIA: ICD-10-CM

## 2022-04-28 PROCEDURE — 99215 OFFICE O/P EST HI 40 MIN: CPT | Mod: S$GLB,,, | Performed by: INTERNAL MEDICINE

## 2022-04-28 PROCEDURE — 99215 PR OFFICE/OUTPT VISIT, EST, LEVL V, 40-54 MIN: ICD-10-PCS | Mod: S$GLB,,, | Performed by: INTERNAL MEDICINE

## 2022-04-28 NOTE — PROGRESS NOTES
"      Reason for consult:   Subjective:      Patient ID: Bandar Tomas is a 71 y.o. male.    Chief Complaint:: Follow-up    HPI 71-year-old man with past medical history of obesity, BMI 32, HTN, D LP, DM, uncontrolled, on insulin NPH/regular, hemoglobin A1c 16, used to live in [place] and used to follow at [facility], he has moved to [place] to be close to his daughter and grandchildren.    In the beginning of February he noticed a blister over the left 1st toe which got progressively worse then he needed left great toe amputation on 02/17/2022, by Dr. Jesse Rodríguez. "Due to the degree of soft tissue necrosis around the metatarsophalangeal joint the incision was not able to be fully primarily closed."      Patient was following with Dr. Kirsty Rodríguez.  The wound needed a wound VAC and was not healing properly.  Cultures obtained on 03/18/2022 showed Staphylococcus Simulans, and Corynebacterium stratum  Patient has been on doxycycline.  He missed an appointment with me due to transportation  He feels that the wound is steadily improving    04/07/2022  He has wound vac on, He takes pictures for me when wound vac is taken down  Wound is doing well  No systemic signs of infection  04/04-- d1 dalvance is already received  04/11-- it will be d8 of dalvance    Doxycycline I had ordered on prior visited for whatever reason had not gone through to pharmacy.  I gave a handwritten prescription to patient today.      04/28/2022  Patient is pleased with his wound healing.  Wound care nurse as well is pleased with wound healing.  I looked at it and it is looking great.  He has received 2 doses of dull Field and will receive the 3rd dose on May 5th.  He is taking doxycycline twice a day appropriately and has enough till May 30th.  I asked him to complete and then stop.  He looked short winded just doing minimal movement.  Patient has history of sleep apnea and was a heavy smoker, he used to be on BiPAP and on a breathing treatment " which he does not know the name.  I advised that he needs to be worked up from cardiac and pulmonary standpoint to keep him healthy.    He is working hard on controlling his diabetes.  He is motivated and proactive, eating healthy are.     Potassium was elevated last time and spironolactone has been held.  He will follow with his PCP.  Blood pressure today is not perfect but not terrible here, 144 systolic       Review of patient's allergies indicates:  No Known Allergies  Past Medical History:   Diagnosis Date    Diabetes     Former smoker     HTN (hypertension)      Past Surgical History:   Procedure Laterality Date    COLONOSCOPY  prior to 2007    COLONOSCOPY N/A 3/31/2016    Procedure: COLONOSCOPY;  Surgeon: Perry Rodriguez MD;  Location: [facility];  Service: Endoscopy;  Laterality: N/A;    SHOULDER SURGERY Left     TOE AMPUTATION Left 2/17/2022    Procedure: AMPUTATION, TOE;  Surgeon: Jesse Rodríguez DPM;  Location: [facility] OR;  Service: Podiatry;  Laterality: Left;    UPPER GASTROINTESTINAL ENDOSCOPY       Social History     Tobacco Use    Smoking status: Former Smoker    Smokeless tobacco: Never Used   Substance Use Topics    Alcohol use: Yes     Alcohol/week: 0.0 standard drinks        Hunting:  Fishing:  Pets:  Exposure to sick contacts:  Exposure to TB:  Travel:     Family History   Problem Relation Age of Onset    Brain cancer Brother 54    Esophageal cancer Paternal Grandfather 87    Colon polyps Neg Hx        Review of Systems   Constitutional:Negative  No appetite change (trying to eat healthy, ), chills, diaphoresis, fatigue and fever. Activity change: tired , not moving much since he has been retired   HENT: Negative for congestion, dental problem, ear pain, hearing loss, mouth sores, postnasal drip, rhinorrhea, sinus pain, sore throat and trouble swallowing.    Eyes: Negative for photophobia, pain, discharge, redness, itching and visual disturbance.   Respiratory: Negative for apnea,  "cough, choking, chest tightness, shortness of breath, wheezing and stridor.   history of sleep apnea  Cardiovascular: Negative for chest pain, palpitations and leg swelling.  denies shortness of breath but he looked short winded as he tried to tie his shoe  Gastrointestinal: Negative for abdominal distention, abdominal pain, anal bleeding, blood in stool, constipation, diarrhea, nausea, rectal pain and vomiting.   ? Colon surgery ? Polyp 2 years ago  Due for repeat colonoscopy(at [facility]) with Dr Stroud   Endocrine: Positive for polydipsia and polyuria. Negative for cold intolerance and heat intolerance.        Dm x18    Only x1 hypogl 69 -- he did not feel it    Asked for glc strips    Genitourinary: Negative for decreased urine volume, difficulty urinating, dysuria, enuresis, flank pain, frequency, genital sores, hematuria, penile discharge, penile pain, penile swelling, scrotal swelling, testicular pain and urgency.        BPH sp surgery    Musculoskeletal: Negative for arthralgias, back pain (not any more, sp surgery, . left shoulder surgery, by dr Sam), gait problem, joint swelling, myalgias, neck pain and neck stiffness.   Skin: Positive for wound (feet bl). Negative for color change, pallor and rash.   Allergic/Immunologic: Negative for environmental allergies, food allergies and immunocompromised state.   Neurological: Negative for dizziness, tremors, seizures, syncope, facial asymmetry, speech difficulty, weakness, light-headedness, numbness and headaches.   Hematological: Negative for adenopathy. Does not bruise/bleed easily.   Psychiatric/Behavioral: Negative for agitation, behavioral problems, confusion, decreased concentration, dysphoric mood, hallucinations, self-injury, sleep disturbance and suicidal ideas. The patient is not nervous/anxious and is not hyperactive.         Pertinent medications noted:     Objective:      Blood pressure (!) 144/66, pulse 80, temperature 97.7 °F (36.5 °C), height 6' 1" " (1.854 m), weight 115.7 kg (255 lb), SpO2 96 %.  Body mass index is 33.64 kg/m².  Physical Exam  Constitutional:       General: He is not in acute distress.     Appearance: He is not diaphoretic.   HENT:      Head: Atraumatic.      Right Ear: External ear normal.      Left Ear: External ear normal.      Nose: Nose normal.   Eyes:      General: No scleral icterus.     Conjunctiva/sclera: Conjunctivae normal.      Pupils: Pupils are equal, round, and reactive to light.   Neck:      Vascular: No JVD.   Cardiovascular:      Rate and Rhythm: Normal rate and regular rhythm.      Heart sounds: Normal heart sounds.   Pulmonary:      Effort: Pulmonary effort is normal.      Breath sounds: Normal breath sounds. No wheezing or rales.   Chest:      Chest wall: No tenderness.   Abdominal:      General: Bowel sounds are normal. There is no distension.      Palpations: Abdomen is soft. There is no mass.      Tenderness: There is no abdominal tenderness. There is no guarding or rebound.   Musculoskeletal:         General: Normal range of motion.      Cervical back: Normal range of motion and neck supple.   Lymphadenopathy:      Cervical: No cervical adenopathy.   Skin:     General: Skin is warm and dry.      Findings: No erythema or rash.   Neurological:      Mental Status: He is alert and oriented to person, place, and time.      Cranial Nerves: No cranial nerve deficit.   Psychiatric:         Behavior: Behavior normal.         Thought Content: Thought content normal.         VAD:  None  04/28/2022                Prior pictures                02/14                                                                          General Labs reviewed:  Lab Results   Component Value Date    WBC 6.30 04/18/2022    RBC 4.41 (L) 04/18/2022    HGB 10.6 (L) 04/18/2022    HCT 35.5 (L) 04/18/2022    MCV 81 (L) 04/18/2022    MCH 24.0 (L) 04/18/2022    MCHC 29.9 (L) 04/18/2022    RDW 17.7 (H) 04/18/2022     (H) 04/18/2022    MPV 10.5  04/18/2022    GRAN 3.7 04/18/2022    GRAN 58.3 04/18/2022    LYMPH 1.9 04/18/2022    LYMPH 29.7 04/18/2022    MONO 0.5 04/18/2022    MONO 7.8 04/18/2022    EOS 0.2 04/18/2022    BASO 0.08 04/18/2022    EOSINOPHIL 2.7 04/18/2022    BASOPHIL 1.3 04/18/2022     CMP  Sodium   Date Value Ref Range Status   04/22/2022 139 136 - 145 mmol/L Final     Potassium   Date Value Ref Range Status   04/22/2022 4.5 3.5 - 5.1 mmol/L Final     Chloride   Date Value Ref Range Status   04/22/2022 104 95 - 110 mmol/L Final     CO2   Date Value Ref Range Status   04/22/2022 25 23 - 29 mmol/L Final     Glucose   Date Value Ref Range Status   04/22/2022 153 (H) 70 - 110 mg/dL Final     BUN   Date Value Ref Range Status   04/22/2022 23 8 - 23 mg/dL Final     Creatinine   Date Value Ref Range Status   04/22/2022 1.4 0.5 - 1.4 mg/dL Final     Calcium   Date Value Ref Range Status   04/22/2022 9.5 8.7 - 10.5 mg/dL Final     Total Protein   Date Value Ref Range Status   04/18/2022 7.6 6.0 - 8.4 g/dL Final     Albumin   Date Value Ref Range Status   04/18/2022 3.9 3.5 - 5.2 g/dL Final     Total Bilirubin   Date Value Ref Range Status   04/18/2022 0.2 0.1 - 1.0 mg/dL Final     Comment:     For infants and newborns, interpretation of results should be based  on gestational age, weight and in agreement with clinical  observations.    Premature Infant recommended reference ranges:  Up to 24 hours.............<8.0 mg/dL  Up to 48 hours............<12.0 mg/dL  3-5 days..................<15.0 mg/dL  6-29 days.................<15.0 mg/dL       Alkaline Phosphatase   Date Value Ref Range Status   04/18/2022 90 55 - 135 U/L Final     AST   Date Value Ref Range Status   04/18/2022 19 10 - 40 U/L Final     ALT   Date Value Ref Range Status   04/18/2022 21 10 - 44 U/L Final     Anion Gap   Date Value Ref Range Status   04/22/2022 10 8 - 16 mmol/L Final     eGFR if    Date Value Ref Range Status   04/22/2022 58.8 (A) >60 mL/min/1.73 m^2 Final      eGFR if non    Date Value Ref Range Status   04/22/2022 50.9 (A) >60 mL/min/1.73 m^2 Final     Comment:     Calculation used to obtain the estimated glomerular filtration  rate (eGFR) is the CKD-EPI equation.          Micro:  Microbiology Results (last 7 days)     ** No results found for the last 168 hours. **        Imaging Reviewed:    Assessment:       1. Osteomyelitis of great toe of left foot    2. Dyspnea, unspecified type    3. Sleep apnea, unspecified type    4. Former smoker    5. Peripheral arterial disease    6. Hyperkalemia    7. Obesity (BMI 30.0-34.9)    8. Diabetes mellitus type 2 in obese           Plan:       Osteomyelitis of great toe of left foot  Comments:  Responding great to Dalvance plus doxycycline  Wound care  Wound VAC    Dyspnea, unspecified type  Comments:  Probably due to his history of smoking,?  COPD, need to rule out cardiac issues  Orders:  -     EKG 12-lead; Future; Expected date: 04/28/2022  -     Echo; Future  -     Ambulatory referral/consult to Cardiology; Future; Expected date: 05/05/2022  -     Pulmonary function test; Future  -     Ambulatory referral/consult to Pulmonology; Future; Expected date: 05/05/2022  -     Ambulatory referral/consult to Sleep Disorders; Future; Expected date: 05/05/2022    Sleep apnea, unspecified type  Comments:  Used to be on treatment but not anymore.  I Advised to follow with pulmonologist  Orders:  -     Pulmonary function test; Future  -     Ambulatory referral/consult to Pulmonology; Future; Expected date: 05/05/2022  -     Ambulatory referral/consult to Sleep Disorders; Future; Expected date: 05/05/2022    Former smoker    Peripheral arterial disease  Comments:  Follow with cardiology  Arterial ultrasound did not show any significant stenosis but it needs to be repeated in the future    Hyperkalemia  Comments:  Off spironolactone, potassium normal    Obesity (BMI 30.0-34.9)    Diabetes mellitus type 2 in  obese  Comments:  He is working on better controlling it.  Hemoglobin A1c was 16 and dropped to 9, hopefully it is better in the near future      Follow up in about 20 days (around 5/18/2022).      Osteomyelitis of the left 1st metatarsal.  Organisms on 03/18/2022 are corynebacterium striatum and Staphylococcus Simulans   Dalvance 04/04, 04/11 05/5  Continue doxycycline by mouth.    I discussed with patient that he needs to be proactive in a dressing decreased activity and dyspnea on exertion, I reinforced that heart  health takes priority    This note was created using voice recognition software that occasionally misinterpreted phrases or words.

## 2022-04-29 ENCOUNTER — LAB VISIT (OUTPATIENT)
Dept: LAB | Facility: HOSPITAL | Age: 72
End: 2022-04-29
Attending: INTERNAL MEDICINE
Payer: MEDICARE

## 2022-04-29 DIAGNOSIS — E11.51 TYPE II DIABETES MELLITUS WITH PERIPHERAL CIRCULATORY DISORDER: ICD-10-CM

## 2022-04-29 DIAGNOSIS — R79.89 ABNORMAL CBC: ICD-10-CM

## 2022-04-29 DIAGNOSIS — Z47.81 AFTERCARE FOR AMPUTATION STUMP: ICD-10-CM

## 2022-04-29 DIAGNOSIS — Z78.9 AMERICAN DIABETES ASSOCIATION (ADA) 1100 CALORIE DIET: Primary | ICD-10-CM

## 2022-04-29 LAB
ALBUMIN SERPL BCP-MCNC: 3.9 G/DL (ref 3.5–5.2)
ALP SERPL-CCNC: 76 U/L (ref 55–135)
ALT SERPL W/O P-5'-P-CCNC: 21 U/L (ref 10–44)
ANION GAP SERPL CALC-SCNC: 9 MMOL/L (ref 8–16)
AST SERPL-CCNC: 18 U/L (ref 10–40)
BASOPHILS # BLD AUTO: 0.07 K/UL (ref 0–0.2)
BASOPHILS NFR BLD: 1.4 % (ref 0–1.9)
BILIRUB SERPL-MCNC: 0.3 MG/DL (ref 0.1–1)
BUN SERPL-MCNC: 19 MG/DL (ref 8–23)
CALCIUM SERPL-MCNC: 9.4 MG/DL (ref 8.7–10.5)
CHLORIDE SERPL-SCNC: 106 MMOL/L (ref 95–110)
CO2 SERPL-SCNC: 27 MMOL/L (ref 23–29)
CREAT SERPL-MCNC: 1.2 MG/DL (ref 0.5–1.4)
CRP SERPL-MCNC: 2.7 MG/L (ref 0–8.2)
DIFFERENTIAL METHOD: ABNORMAL
EOSINOPHIL # BLD AUTO: 0.1 K/UL (ref 0–0.5)
EOSINOPHIL NFR BLD: 2.4 % (ref 0–8)
ERYTHROCYTE [DISTWIDTH] IN BLOOD BY AUTOMATED COUNT: 18 % (ref 11.5–14.5)
ERYTHROCYTE [SEDIMENTATION RATE] IN BLOOD BY WESTERGREN METHOD: 12 MM/HR (ref 0–10)
EST. GFR  (AFRICAN AMERICAN): >60 ML/MIN/1.73 M^2
EST. GFR  (NON AFRICAN AMERICAN): >60 ML/MIN/1.73 M^2
GLUCOSE SERPL-MCNC: 83 MG/DL (ref 70–110)
HCT VFR BLD AUTO: 36 % (ref 40–54)
HGB BLD-MCNC: 10.7 G/DL (ref 14–18)
IMM GRANULOCYTES # BLD AUTO: 0.01 K/UL (ref 0–0.04)
IMM GRANULOCYTES NFR BLD AUTO: 0.2 % (ref 0–0.5)
LYMPHOCYTES # BLD AUTO: 1.5 K/UL (ref 1–4.8)
LYMPHOCYTES NFR BLD: 31.3 % (ref 18–48)
MCH RBC QN AUTO: 23.9 PG (ref 27–31)
MCHC RBC AUTO-ENTMCNC: 29.7 G/DL (ref 32–36)
MCV RBC AUTO: 80 FL (ref 82–98)
MONOCYTES # BLD AUTO: 0.5 K/UL (ref 0.3–1)
MONOCYTES NFR BLD: 10.2 % (ref 4–15)
NEUTROPHILS # BLD AUTO: 2.7 K/UL (ref 1.8–7.7)
NEUTROPHILS NFR BLD: 54.5 % (ref 38–73)
NRBC BLD-RTO: 0 /100 WBC
PLATELET # BLD AUTO: 444 K/UL (ref 150–450)
PMV BLD AUTO: 10.5 FL (ref 9.2–12.9)
POTASSIUM SERPL-SCNC: 5.2 MMOL/L (ref 3.5–5.1)
PROT SERPL-MCNC: 7.4 G/DL (ref 6–8.4)
RBC # BLD AUTO: 4.48 M/UL (ref 4.6–6.2)
SODIUM SERPL-SCNC: 142 MMOL/L (ref 136–145)
WBC # BLD AUTO: 4.92 K/UL (ref 3.9–12.7)

## 2022-04-29 PROCEDURE — 85025 COMPLETE CBC W/AUTO DIFF WBC: CPT | Performed by: INTERNAL MEDICINE

## 2022-04-29 PROCEDURE — 36415 COLL VENOUS BLD VENIPUNCTURE: CPT | Performed by: INTERNAL MEDICINE

## 2022-04-29 PROCEDURE — 86140 C-REACTIVE PROTEIN: CPT | Performed by: INTERNAL MEDICINE

## 2022-04-29 PROCEDURE — 85651 RBC SED RATE NONAUTOMATED: CPT | Performed by: INTERNAL MEDICINE

## 2022-04-29 PROCEDURE — 80053 COMPREHEN METABOLIC PANEL: CPT | Performed by: INTERNAL MEDICINE

## 2022-05-02 ENCOUNTER — INFUSION (OUTPATIENT)
Dept: INFUSION THERAPY | Facility: HOSPITAL | Age: 72
End: 2022-05-02
Attending: INTERNAL MEDICINE
Payer: MEDICARE

## 2022-05-02 VITALS
HEART RATE: 92 BPM | RESPIRATION RATE: 18 BRPM | TEMPERATURE: 98 F | OXYGEN SATURATION: 98 % | SYSTOLIC BLOOD PRESSURE: 159 MMHG | DIASTOLIC BLOOD PRESSURE: 76 MMHG

## 2022-05-02 DIAGNOSIS — L97.421 DIABETIC ULCER OF LEFT MIDFOOT ASSOCIATED WITH DIABETES MELLITUS DUE TO UNDERLYING CONDITION, LIMITED TO BREAKDOWN OF SKIN: Primary | ICD-10-CM

## 2022-05-02 DIAGNOSIS — E08.621 DIABETIC ULCER OF LEFT MIDFOOT ASSOCIATED WITH DIABETES MELLITUS DUE TO UNDERLYING CONDITION, LIMITED TO BREAKDOWN OF SKIN: Primary | ICD-10-CM

## 2022-05-02 PROCEDURE — 63600175 PHARM REV CODE 636 W HCPCS: Mod: JG | Performed by: INTERNAL MEDICINE

## 2022-05-02 PROCEDURE — 96365 THER/PROPH/DIAG IV INF INIT: CPT

## 2022-05-02 PROCEDURE — 25000003 PHARM REV CODE 250: Performed by: INTERNAL MEDICINE

## 2022-05-02 RX ADMIN — DALBAVANCIN 1500 MG: 500 INJECTION, POWDER, FOR SOLUTION INTRAVENOUS at 08:05

## 2022-05-06 ENCOUNTER — LAB VISIT (OUTPATIENT)
Dept: LAB | Facility: HOSPITAL | Age: 72
End: 2022-05-06
Attending: INTERNAL MEDICINE
Payer: MEDICARE

## 2022-05-06 ENCOUNTER — TELEPHONE (OUTPATIENT)
Dept: PODIATRY | Facility: CLINIC | Age: 72
End: 2022-05-06

## 2022-05-06 ENCOUNTER — OFFICE VISIT (OUTPATIENT)
Dept: WOUND CARE | Facility: HOSPITAL | Age: 72
End: 2022-05-06
Attending: PODIATRIST
Payer: MEDICARE

## 2022-05-06 VITALS
TEMPERATURE: 98 F | RESPIRATION RATE: 18 BRPM | SYSTOLIC BLOOD PRESSURE: 151 MMHG | HEART RATE: 83 BPM | DIASTOLIC BLOOD PRESSURE: 75 MMHG

## 2022-05-06 DIAGNOSIS — L97.522 ULCER OF LEFT FOOT WITH FAT LAYER EXPOSED: ICD-10-CM

## 2022-05-06 DIAGNOSIS — E11.628 DIABETIC FOOT INFECTION: ICD-10-CM

## 2022-05-06 DIAGNOSIS — E66.9 OBESITY (BMI 30.0-34.9): ICD-10-CM

## 2022-05-06 DIAGNOSIS — L08.9 DIABETIC FOOT INFECTION: ICD-10-CM

## 2022-05-06 DIAGNOSIS — R09.89 DECREASED PEDAL PULSES: ICD-10-CM

## 2022-05-06 DIAGNOSIS — Z89.432 HISTORY OF AMPUTATION OF LEFT FOOT: ICD-10-CM

## 2022-05-06 DIAGNOSIS — L97.513 ULCER OF RIGHT FOOT WITH NECROSIS OF MUSCLE: ICD-10-CM

## 2022-05-06 DIAGNOSIS — L97.524 ULCER OF LEFT FOOT WITH NECROSIS OF BONE: Primary | ICD-10-CM

## 2022-05-06 DIAGNOSIS — I10 ESSENTIAL HYPERTENSION, MALIGNANT: Primary | ICD-10-CM

## 2022-05-06 DIAGNOSIS — E11.65 UNCONTROLLED TYPE 2 DIABETES MELLITUS WITH HYPERGLYCEMIA: ICD-10-CM

## 2022-05-06 DIAGNOSIS — E11.51 TYPE II DIABETES MELLITUS WITH PERIPHERAL CIRCULATORY DISORDER: ICD-10-CM

## 2022-05-06 DIAGNOSIS — M86.10 ACUTE OSTEOMYELITIS: ICD-10-CM

## 2022-05-06 DIAGNOSIS — Z91.89 AT HIGH RISK FOR INADEQUATE NUTRITIONAL INTAKE: ICD-10-CM

## 2022-05-06 DIAGNOSIS — Z47.81 AFTERCARE FOR AMPUTATION STUMP: ICD-10-CM

## 2022-05-06 LAB
ALBUMIN SERPL BCP-MCNC: 3.9 G/DL (ref 3.5–5.2)
ALP SERPL-CCNC: 73 U/L (ref 55–135)
ALT SERPL W/O P-5'-P-CCNC: 27 U/L (ref 10–44)
ANION GAP SERPL CALC-SCNC: 10 MMOL/L (ref 8–16)
AST SERPL-CCNC: 24 U/L (ref 10–40)
BASOPHILS # BLD AUTO: 0.03 K/UL (ref 0–0.2)
BASOPHILS NFR BLD: 0.6 % (ref 0–1.9)
BILIRUB SERPL-MCNC: 0.2 MG/DL (ref 0.1–1)
BUN SERPL-MCNC: 15 MG/DL (ref 8–23)
CALCIUM SERPL-MCNC: 9 MG/DL (ref 8.7–10.5)
CHLORIDE SERPL-SCNC: 109 MMOL/L (ref 95–110)
CO2 SERPL-SCNC: 23 MMOL/L (ref 23–29)
CREAT SERPL-MCNC: 1.2 MG/DL (ref 0.5–1.4)
CRP SERPL-MCNC: 1.8 MG/L (ref 0–8.2)
DIFFERENTIAL METHOD: ABNORMAL
EOSINOPHIL # BLD AUTO: 0.1 K/UL (ref 0–0.5)
EOSINOPHIL NFR BLD: 1.1 % (ref 0–8)
ERYTHROCYTE [DISTWIDTH] IN BLOOD BY AUTOMATED COUNT: 17.5 % (ref 11.5–14.5)
ERYTHROCYTE [SEDIMENTATION RATE] IN BLOOD BY WESTERGREN METHOD: 16 MM/HR (ref 0–10)
EST. GFR  (AFRICAN AMERICAN): >60 ML/MIN/1.73 M^2
EST. GFR  (NON AFRICAN AMERICAN): >60 ML/MIN/1.73 M^2
GLUCOSE SERPL-MCNC: 54 MG/DL (ref 70–110)
HCT VFR BLD AUTO: 35 % (ref 40–54)
HGB BLD-MCNC: 10.5 G/DL (ref 14–18)
IMM GRANULOCYTES # BLD AUTO: 0.01 K/UL (ref 0–0.04)
IMM GRANULOCYTES NFR BLD AUTO: 0.2 % (ref 0–0.5)
LYMPHOCYTES # BLD AUTO: 1.1 K/UL (ref 1–4.8)
LYMPHOCYTES NFR BLD: 20.6 % (ref 18–48)
MCH RBC QN AUTO: 24.1 PG (ref 27–31)
MCHC RBC AUTO-ENTMCNC: 30 G/DL (ref 32–36)
MCV RBC AUTO: 80 FL (ref 82–98)
MONOCYTES # BLD AUTO: 0.5 K/UL (ref 0.3–1)
MONOCYTES NFR BLD: 8.4 % (ref 4–15)
NEUTROPHILS # BLD AUTO: 3.7 K/UL (ref 1.8–7.7)
NEUTROPHILS NFR BLD: 69.1 % (ref 38–73)
NRBC BLD-RTO: 0 /100 WBC
PLATELET # BLD AUTO: 422 K/UL (ref 150–450)
PMV BLD AUTO: 10.4 FL (ref 9.2–12.9)
POTASSIUM SERPL-SCNC: 4.8 MMOL/L (ref 3.5–5.1)
PROT SERPL-MCNC: 7.4 G/DL (ref 6–8.4)
RBC # BLD AUTO: 4.36 M/UL (ref 4.6–6.2)
SODIUM SERPL-SCNC: 142 MMOL/L (ref 136–145)
WBC # BLD AUTO: 5.35 K/UL (ref 3.9–12.7)

## 2022-05-06 PROCEDURE — 80053 COMPREHEN METABOLIC PANEL: CPT | Performed by: INTERNAL MEDICINE

## 2022-05-06 PROCEDURE — 85651 RBC SED RATE NONAUTOMATED: CPT | Performed by: INTERNAL MEDICINE

## 2022-05-06 PROCEDURE — 86140 C-REACTIVE PROTEIN: CPT | Performed by: INTERNAL MEDICINE

## 2022-05-06 PROCEDURE — 11042 DBRDMT SUBQ TIS 1ST 20SQCM/<: CPT | Mod: ,,, | Performed by: PODIATRIST

## 2022-05-06 PROCEDURE — 99214 PR OFFICE/OUTPT VISIT, EST, LEVL IV, 30-39 MIN: ICD-10-PCS | Mod: 25,,, | Performed by: PODIATRIST

## 2022-05-06 PROCEDURE — 36415 COLL VENOUS BLD VENIPUNCTURE: CPT | Performed by: INTERNAL MEDICINE

## 2022-05-06 PROCEDURE — 11042 DBRDMT SUBQ TIS 1ST 20SQCM/<: CPT | Performed by: PODIATRIST

## 2022-05-06 PROCEDURE — 85025 COMPLETE CBC W/AUTO DIFF WBC: CPT | Performed by: INTERNAL MEDICINE

## 2022-05-06 PROCEDURE — 99214 OFFICE O/P EST MOD 30 MIN: CPT | Mod: 25,,, | Performed by: PODIATRIST

## 2022-05-06 PROCEDURE — 11042 PR DEBRIDEMENT, SKIN, SUB-Q TISSUE,=<20 SQ CM: ICD-10-PCS | Mod: ,,, | Performed by: PODIATRIST

## 2022-05-06 NOTE — TELEPHONE ENCOUNTER
----- Message from Dali Arce sent at 5/6/2022  2:45 PM CDT -----  Contact: [facility]  Type: Needs Medical Advice    Who Called: [facility]  Best Call Back Number: [phone]  Inquiry/Question: pt had [facility] appt today and they need updated orders please          Thank you~

## 2022-05-06 NOTE — TELEPHONE ENCOUNTER
----- Message from Dali Arce sent at 5/6/2022  2:45 PM CDT -----  Contact: [facility]  Type: Needs Medical Advice    Who Called: [facility]  Best Call Back Number: [phone]  Inquiry/Question: pt had [facility] appt today and they need updated orders please          Thank you~

## 2022-05-06 NOTE — PROGRESS NOTES
[address] [facility] [address]  [place] [address]  Phone: [phone]   [phone]    Patient's PCP:Veena Stroud MD  Referring Provider: No ref. provider found    Subjective:      Chief Complaint:: Wound Care, Non-healing Wound, Pressure Ulcer, Diabetes, Diabetic Foot Ulcer (Left foot ulcers), and Wound Check (Right plantar foot ulcer- healed)    HPI   Presents for follow-up of left foot ulcers.      Additionally, patient presents for follow-up of right plantar foot ulcer.  Right plantar foot ulcer is healed.  Minimal callus overlying healed ulcer.  No signs infection.  No openings.        1. Debridement of left foot ulcer See Wound Docs for assessment of wounds and procedure notes  2. Continue taking all medications as prescribed, IV antibiotics per Infectious Disease  3. Continue home health dressing changes, collagen followed by bulky wrap  4. RTC one week   5. Counseled patient on increasing protein intake, not getting wounds wet, keeping dressings clean dry and intact, following a healthy diet, elevating legs when able, removing pressure from wounds     Total time spent for E&M 30  Total time for debridement 35 minutes      Athlete's foot counseling: Counseled patient that it is important to keep the feet dry. Use absorbent cotton socks and change them if they become sweaty. Or wear an open-toe shoe or sandal. Wash the feet at least once a day with soap and water. Apply the antifungal cream as prescribed. Recommend spray antiperspirant to the feet. Some antifungal creams are available without a prescription. It may take a week before the rash starts to improve. It can take about 3 to 4 weeks to completely clear. Continue the medicine until the rash is all gone. Use over-the-counter antifungal powders or sprays on your feet after exposure to high-risk environments, such as public showers, gyms, and locker rooms. This can help prevent future infections. Wearing appropriate shoes in these situations can  help.        Fungal infection of skin explained. Treatment options including no treatment, topical medications, oral medications were discussed, as well as success rates and risks of recurrence. We agreed on topical medication         Bandar Tomas is a 71 y.o. male  with past medical history of hypertension, hyperlipidemia, uncontrolled diabetes, diabetic neuropathy who presents to clinic with a complaint of left great toe ulcer status post amputation of left great toe lasting for approximately 2 weeks. Onset of symptoms was initially patient had a severe infected wound on the left foot and was admitted to the hospital.  See discharge summary below.  He underwent a left great toe amputation with keeping the amputation site open due to not having enough viable skin for closure.  A wound VAC was started.  Referral to wound care was placed.     Upon presentation today, patient also has a open wound on the plantar right foot.  Patient states he does not check his feet so is unsure how long this has been present.  Patient's daughter is bedside and states patient lives alone and is not compliant with his diabetic medication, checking his feet, and additional recommendations by his doctors.  Counseled patient on the need to be compliant.  Counseled patient that if he is not compliant he is risking limb loss as well.        Counseled patient regarding the need to get his blood sugars under control.  Discussed with patient that with blood sugars at this high level it is very difficult to heal wounds.        Discharge summary on 02/21/2022:  HPI:   Mr. Reyes is a 71-year-old male with a past medical history of hypertension, hyperlipidemia, insulin-dependent diabetes mellitus type 2, and obesity BMI 32 who presents today with complaints of a left toe infection.  It is severe.  It is associated with purulent drainage, malodor, and pain.  He denies fever, chills, nausea, vomiting, diarrhea, dizziness, chest pain, shortness of  breath, loss of consciousness.  He has known injury to the toe.  He states he took off his today issues about 2 weeks ago and noticeable ulcerations status toe.  He went to the pharmacy and ask pharmacist for recommendations and was recommended triple antibiotic ointment and soaks for the toe which he did with no improvement.  He does not have a podiatrist.  He states his glucoses have been out of control over the last 2 weeks foot due to this infection.  He does not have an endocrinologist.  X-ray of foot in the ED revealed: Osteomyelitis of great toe proximal and distal phalanges. Pathologic fracture of great toe distal phalanx  Procedure(s) (LRB):  AMPUTATION, TOE (Left)    Hospital Course:   Patient admitted with Diabetic ulcer of Left Great toe and associated acute Osteomyelitis  Pt underwent L great toe amputation  Later pt was discharged to home with HH /wound vac  Extensive education was provided regarding his Uncontrolled DM type 2            Systemic Doctor: Veena Stroud MD  Date Last Seen: about 4 months ago  Blood Sugar: doesn't check  Hemoglobin A1c: 16.4 (2/15/2022)        Patient should call the office immediately if any signs of infection, such as fever, chills, sweats, increased redness or pain.     Patient was instructed to call the clinic or go to the emergency department if their symptoms do not improve, worsens, or if new symptoms develop.  Patient was advised that if any increased swelling, pain, or numbness arise to go immediately to the ED. Patient knows to call any time if an emergency arises. Shared decision making occurred and patient verbalized understanding in agreement with this plan.         Counseling/Education:  I provided patient education verbally regarding:   The aspects of diabetes and how it pertains to the feet. I explained the importance of proper diabetic foot care and how it is essential for the health of their feet.     I discussed the importance of knowing their  Hemoglobin A1c and that the level needs to be as close to 6 as possible. I discussed the increase complications of high blood sugar including stroke, blindness, heart attack, kidney failure and loss of limb secondary to neuropathy and PVD.      With neuropathy, beware of any breaks in the skin or redness. These areas are not recognized early due to the numbness.     I discussed Diabetes, lower back issues, metabolic disorders, systemic causes, chemotherapy, vitamin deficiency, heavy metal exposure, as some of the causes. I also explained that as much as 40% of the time we can not find a cause. I discussed different treatments available to control the symptoms but which may not cure the problem.         Counseled patient on the aspects of diabetes and how it pertains to the feet.  I explained the importance of proper diabetic foot care and how it is essential for the health of their feet.        Shoe inspection. Patient instructed on proper foot hygeine. We discussed wearing proper shoe gear, daily foot inspections, never walking without protective shoe gear, never putting sharp instruments to feet, routine podiatric nail visits every 2-3 months.       I counseled the patient on their conditions, their implications and medical management.     >50% of this > 60 minute visit was spent face to face educating/counseling the patient     I spent a total of 60 minutes on the day of the visit.This includes face to face time and non-face to face time preparing to see the patient (eg, review of tests), obtaining and/or reviewing separately obtained history, documenting clinical information in the electronic or other health record, independently interpreting results and communicating results to the patient/family/caregiver, or care coordinator.        Much of the documentation for this visit was completed in the Wound Docs system.  Please see the attached documentation for further details about the patient's care. Scanned under  the Media tab.         Kirsty GRACY Rodríguez     Vitals:    05/06/22 0811   BP: (!) 151/75   Pulse: 83   Resp: 18   Temp: 98.2 °F (36.8 °C)   TempSrc: Skin   PainSc: 0-No pain      Shoe Size:     Past Surgical History:   Procedure Laterality Date    COLONOSCOPY  prior to 2007    COLONOSCOPY N/A 3/31/2016    Procedure: COLONOSCOPY;  Surgeon: Perry Rodriguez MD;  Location: [facility];  Service: Endoscopy;  Laterality: N/A;    SHOULDER SURGERY Left     TOE AMPUTATION Left 2/17/2022    Procedure: AMPUTATION, TOE;  Surgeon: Jesse Rodríguez DPM;  Location: [facility];  Service: Podiatry;  Laterality: Left;    UPPER GASTROINTESTINAL ENDOSCOPY       Past Medical History:   Diagnosis Date    Diabetes     Former smoker     HTN (hypertension)      Family History   Problem Relation Age of Onset    Brain cancer Brother 54    Esophageal cancer Paternal Grandfather 87    Colon polyps Neg Hx         Social History:   Marital Status:   Alcohol History:  reports current alcohol use.  Tobacco History:  reports that he has quit smoking. He has never used smokeless tobacco.  Drug History:  reports no history of drug use.    Review of patient's allergies indicates:  No Known Allergies    Current Outpatient Medications   Medication Sig Dispense Refill    aspirin 81 MG Chew Take 81 mg by mouth once daily.      blood sugar diagnostic Strp 100 each.  Supply whatever insurance covers. 100 each 11    doxycycline (MONODOX) 100 MG capsule Take 1 capsule (100 mg total) by mouth 2 (two) times daily. 120 capsule 0    hydroCHLOROthiazide (HYDRODIURIL) 25 MG tablet Take 25 mg by mouth every morning.      HYDROcodone-acetaminophen (NORCO) 5-325 mg per tablet Take 1 tablet by mouth every 6 (six) hours as needed for Pain. 30 tablet 0    insulin  unit/mL injection Inject 25 Units into the skin 2 (two) times daily before meals.      insulin regular 100 unit/mL Inj injection Inject 25 Units into the skin 2 (two) times daily  before meals.      losartan (COZAAR) 100 MG tablet Take 100 mg by mouth once daily.      pravastatin (PRAVACHOL) 40 MG tablet Take 40 mg by mouth once daily.      spironolactone (ALDACTONE) 50 MG tablet Take 50 mg by mouth once daily. med on hold       No current facility-administered medications for this visit.       Review of Systems   Constitutional: Negative for chills, fatigue, fever and unexpected weight change.   HENT: Negative for hearing loss and trouble swallowing.    Eyes: Negative for photophobia and visual disturbance.   Respiratory: Negative for cough, shortness of breath and wheezing.    Cardiovascular: Negative for chest pain, palpitations and leg swelling.   Gastrointestinal: Negative for abdominal pain and nausea.   Genitourinary: Negative for dysuria and frequency.   Musculoskeletal: Negative for arthralgias, back pain, gait problem, joint swelling and myalgias.   Skin: Positive for wound. Negative for rash.   Neurological: Negative for tremors, seizures, weakness, numbness and headaches.   Hematological: Does not bruise/bleed easily.         Objective:        Physical Exam:   Foot Exam  Physical Exam  Ortho/SPM Exam     Imaging:            Assessment:       1. Ulcer of left foot with necrosis of bone    2. Ulcer of left foot with fat layer exposed    3. Ulcer of right foot with necrosis of muscle    4. History of amputation of left foot    5. Obesity (BMI 30.0-34.9)    6. Uncontrolled type 2 diabetes mellitus with hyperglycemia    7. At high risk for inadequate nutritional intake    8. Diabetic foot infection    9. Decreased pedal pulses    10. Acute osteomyelitis      Plan:   Ulcer of left foot with necrosis of bone    Ulcer of left foot with fat layer exposed    Ulcer of right foot with necrosis of muscle    History of amputation of left foot    Obesity (BMI 30.0-34.9)    Uncontrolled type 2 diabetes mellitus with hyperglycemia    At high risk for inadequate nutritional intake    Diabetic  foot infection    Decreased pedal pulses    Acute osteomyelitis      Follow up in about 1 week (around 5/13/2022).    Procedures          Counseling:     I provided patient education verbally regarding:   Patient diagnosis, treatment options, as well as alternatives, risks, and benefits.     This note was created using Dragon voice recognition software that occasionally misinterpreted phrases or words.

## 2022-05-10 NOTE — PROGRESS NOTES
I discussed all the blood work with patient especially the glucose of 54. Patient is to discussed with PCP and endocrinologist.

## 2022-05-10 NOTE — PROGRESS NOTES
Discussed with patient all the above blood work  Advised to discuss with PCP and endocrinologist about hypoglycemia 54 x 1

## 2022-05-13 ENCOUNTER — TELEPHONE (OUTPATIENT)
Dept: CARDIOLOGY | Facility: HOSPITAL | Age: 72
End: 2022-05-13

## 2022-05-13 ENCOUNTER — LAB VISIT (OUTPATIENT)
Dept: LAB | Facility: HOSPITAL | Age: 72
End: 2022-05-13
Attending: INTERNAL MEDICINE
Payer: MEDICARE

## 2022-05-13 ENCOUNTER — OFFICE VISIT (OUTPATIENT)
Dept: WOUND CARE | Facility: HOSPITAL | Age: 72
End: 2022-05-13
Attending: PODIATRIST
Payer: MEDICARE

## 2022-05-13 ENCOUNTER — TELEPHONE (OUTPATIENT)
Dept: INFECTIOUS DISEASES | Facility: CLINIC | Age: 72
End: 2022-05-13

## 2022-05-13 VITALS
DIASTOLIC BLOOD PRESSURE: 76 MMHG | RESPIRATION RATE: 18 BRPM | TEMPERATURE: 97 F | HEART RATE: 89 BPM | SYSTOLIC BLOOD PRESSURE: 154 MMHG

## 2022-05-13 DIAGNOSIS — E11.621 DIABETIC FOOT ULCERS: Primary | ICD-10-CM

## 2022-05-13 DIAGNOSIS — R09.89 DECREASED PEDAL PULSES: ICD-10-CM

## 2022-05-13 DIAGNOSIS — L97.509 DIABETIC FOOT ULCERS: Primary | ICD-10-CM

## 2022-05-13 DIAGNOSIS — Z87.2 HISTORY OF ULCER OF LOWER EXTREMITY: ICD-10-CM

## 2022-05-13 DIAGNOSIS — Z89.432 HISTORY OF AMPUTATION OF LEFT FOOT: ICD-10-CM

## 2022-05-13 DIAGNOSIS — L97.524 ULCER OF LEFT FOOT WITH NECROSIS OF BONE: Primary | ICD-10-CM

## 2022-05-13 DIAGNOSIS — E66.9 OBESITY, UNSPECIFIED: ICD-10-CM

## 2022-05-13 DIAGNOSIS — L97.522 ULCER OF LEFT FOOT WITH FAT LAYER EXPOSED: ICD-10-CM

## 2022-05-13 DIAGNOSIS — D50.9 IRON DEFICIENCY ANEMIA, UNSPECIFIED: ICD-10-CM

## 2022-05-13 DIAGNOSIS — M17.9 OSTEOARTHRITIS OF KNEE, UNSPECIFIED: ICD-10-CM

## 2022-05-13 DIAGNOSIS — Z91.89 AT HIGH RISK FOR INADEQUATE NUTRITIONAL INTAKE: ICD-10-CM

## 2022-05-13 DIAGNOSIS — E66.9 OBESITY (BMI 30.0-34.9): ICD-10-CM

## 2022-05-13 DIAGNOSIS — E11.65 UNCONTROLLED TYPE 2 DIABETES MELLITUS WITH HYPERGLYCEMIA: ICD-10-CM

## 2022-05-13 DIAGNOSIS — M86.9 OSTEOMYELITIS OF GREAT TOE OF LEFT FOOT: ICD-10-CM

## 2022-05-13 LAB
ALBUMIN SERPL BCP-MCNC: 4.3 G/DL (ref 3.5–5.2)
ALP SERPL-CCNC: 67 U/L (ref 55–135)
ALT SERPL W/O P-5'-P-CCNC: 29 U/L (ref 10–44)
ANION GAP SERPL CALC-SCNC: 9 MMOL/L (ref 8–16)
AST SERPL-CCNC: 25 U/L (ref 10–40)
BASOPHILS # BLD AUTO: 0.07 K/UL (ref 0–0.2)
BASOPHILS NFR BLD: 1 % (ref 0–1.9)
BILIRUB SERPL-MCNC: 0.4 MG/DL (ref 0.1–1)
BUN SERPL-MCNC: 24 MG/DL (ref 8–23)
CALCIUM SERPL-MCNC: 9 MG/DL (ref 8.7–10.5)
CHLORIDE SERPL-SCNC: 105 MMOL/L (ref 95–110)
CO2 SERPL-SCNC: 27 MMOL/L (ref 23–29)
CREAT SERPL-MCNC: 1.2 MG/DL (ref 0.5–1.4)
CRP SERPL-MCNC: 0.29 MG/DL
DIFFERENTIAL METHOD: ABNORMAL
EOSINOPHIL # BLD AUTO: 0.1 K/UL (ref 0–0.5)
EOSINOPHIL NFR BLD: 1.7 % (ref 0–8)
ERYTHROCYTE [DISTWIDTH] IN BLOOD BY AUTOMATED COUNT: 18 % (ref 11.5–14.5)
ERYTHROCYTE [SEDIMENTATION RATE] IN BLOOD BY WESTERGREN METHOD: 21 MM/HR (ref 0–10)
EST. GFR  (AFRICAN AMERICAN): >60 ML/MIN/1.73 M^2
EST. GFR  (NON AFRICAN AMERICAN): >60 ML/MIN/1.73 M^2
GLUCOSE SERPL-MCNC: 56 MG/DL (ref 70–110)
HCT VFR BLD AUTO: 36.3 % (ref 40–54)
HGB BLD-MCNC: 11 G/DL (ref 14–18)
IMM GRANULOCYTES # BLD AUTO: 0.02 K/UL (ref 0–0.04)
IMM GRANULOCYTES NFR BLD AUTO: 0.3 % (ref 0–0.5)
LYMPHOCYTES # BLD AUTO: 2.4 K/UL (ref 1–4.8)
LYMPHOCYTES NFR BLD: 33.9 % (ref 18–48)
MCH RBC QN AUTO: 24.2 PG (ref 27–31)
MCHC RBC AUTO-ENTMCNC: 30.3 G/DL (ref 32–36)
MCV RBC AUTO: 80 FL (ref 82–98)
MONOCYTES # BLD AUTO: 0.6 K/UL (ref 0.3–1)
MONOCYTES NFR BLD: 8.8 % (ref 4–15)
NEUTROPHILS # BLD AUTO: 3.8 K/UL (ref 1.8–7.7)
NEUTROPHILS NFR BLD: 54.3 % (ref 38–73)
NRBC BLD-RTO: 0 /100 WBC
PLATELET # BLD AUTO: 449 K/UL (ref 150–450)
PMV BLD AUTO: 10 FL (ref 9.2–12.9)
POTASSIUM SERPL-SCNC: 4.5 MMOL/L (ref 3.5–5.1)
PROT SERPL-MCNC: 7.9 G/DL (ref 6–8.4)
RBC # BLD AUTO: 4.55 M/UL (ref 4.6–6.2)
SODIUM SERPL-SCNC: 141 MMOL/L (ref 136–145)
WBC # BLD AUTO: 6.93 K/UL (ref 3.9–12.7)

## 2022-05-13 PROCEDURE — 11042 PR DEBRIDEMENT, SKIN, SUB-Q TISSUE,=<20 SQ CM: ICD-10-PCS | Mod: ,,, | Performed by: PODIATRIST

## 2022-05-13 PROCEDURE — 99214 PR OFFICE/OUTPT VISIT, EST, LEVL IV, 30-39 MIN: ICD-10-PCS | Mod: 25,,, | Performed by: PODIATRIST

## 2022-05-13 PROCEDURE — 85651 RBC SED RATE NONAUTOMATED: CPT | Performed by: INTERNAL MEDICINE

## 2022-05-13 PROCEDURE — 36415 COLL VENOUS BLD VENIPUNCTURE: CPT | Performed by: INTERNAL MEDICINE

## 2022-05-13 PROCEDURE — 80053 COMPREHEN METABOLIC PANEL: CPT | Performed by: INTERNAL MEDICINE

## 2022-05-13 PROCEDURE — 85025 COMPLETE CBC W/AUTO DIFF WBC: CPT | Performed by: INTERNAL MEDICINE

## 2022-05-13 PROCEDURE — 11042 DBRDMT SUBQ TIS 1ST 20SQCM/<: CPT | Mod: ,,, | Performed by: PODIATRIST

## 2022-05-13 PROCEDURE — 11042 DBRDMT SUBQ TIS 1ST 20SQCM/<: CPT | Performed by: PODIATRIST

## 2022-05-13 PROCEDURE — 86140 C-REACTIVE PROTEIN: CPT | Performed by: INTERNAL MEDICINE

## 2022-05-13 PROCEDURE — 99214 OFFICE O/P EST MOD 30 MIN: CPT | Mod: 25,,, | Performed by: PODIATRIST

## 2022-05-13 RX ORDER — DOXYCYCLINE 100 MG/1
100 CAPSULE ORAL 2 TIMES DAILY
Qty: 120 CAPSULE | Refills: 0 | Status: SHIPPED | OUTPATIENT
Start: 2022-05-13 | End: 2022-07-12

## 2022-05-13 NOTE — TELEPHONE ENCOUNTER
I called, he had rushed out the door and did not eat much before  the lab.    He feels well and ate after lab was done

## 2022-05-13 NOTE — PROGRESS NOTES
[address] [facility] [address]  [place] [address]  Phone: [phone]   [phone]    Patient's PCP:Veena Stroud MD  Referring Provider: No ref. provider found    Subjective:      Chief Complaint:: Wound Care, Wound Infection, Non-healing Wound, Diabetic Foot Ulcer, and Diabetes    HPI  Presents for follow-up of left foot ulcers.       Additionally, patient presents for follow-up of right plantar foot ulcer.  Right plantar foot ulcer is healed.  Minimal callus overlying healed ulcer.  No signs infection.  No openings.           1. Debridement of left foot ulcer See Wound Docs for assessment of wounds and procedure notes  2. Continue taking all medications as prescribed, doxycycline  3. Continue home health dressing changes, collagen followed by bulky wrap  4. RTC one week   5. Counseled patient on increasing protein intake, not getting wounds wet, keeping dressings clean dry and intact, following a healthy diet, elevating legs when able, removing pressure from wounds     Total time spent for E&M 30  Total time for debridement 35 minutes      Athlete's foot counseling: Counseled patient that it is important to keep the feet dry. Use absorbent cotton socks and change them if they become sweaty. Or wear an open-toe shoe or sandal. Wash the feet at least once a day with soap and water. Apply the antifungal cream as prescribed. Recommend spray antiperspirant to the feet. Some antifungal creams are available without a prescription. It may take a week before the rash starts to improve. It can take about 3 to 4 weeks to completely clear. Continue the medicine until the rash is all gone. Use over-the-counter antifungal powders or sprays on your feet after exposure to high-risk environments, such as public showers, gyms, and locker rooms. This can help prevent future infections. Wearing appropriate shoes in these situations can help.        Fungal infection of skin explained. Treatment options including no  treatment, topical medications, oral medications were discussed, as well as success rates and risks of recurrence. We agreed on topical medication         Bandar Tomas is a 71 y.o. male  with past medical history of hypertension, hyperlipidemia, uncontrolled diabetes, diabetic neuropathy who presents to clinic with a complaint of left great toe ulcer status post amputation of left great toe lasting for approximately 2 weeks. Onset of symptoms was initially patient had a severe infected wound on the left foot and was admitted to the hospital.  See discharge summary below.  He underwent a left great toe amputation with keeping the amputation site open due to not having enough viable skin for closure.  A wound VAC was started.  Referral to wound care was placed.     Upon presentation today, patient also has a open wound on the plantar right foot.  Patient states he does not check his feet so is unsure how long this has been present.  Patient's daughter is bedside and states patient lives alone and is not compliant with his diabetic medication, checking his feet, and additional recommendations by his doctors.  Counseled patient on the need to be compliant.  Counseled patient that if he is not compliant he is risking limb loss as well.        Counseled patient regarding the need to get his blood sugars under control.  Discussed with patient that with blood sugars at this high level it is very difficult to heal wounds.        Discharge summary on 02/21/2022:  HPI:   Mr. Reyes is a 71-year-old male with a past medical history of hypertension, hyperlipidemia, insulin-dependent diabetes mellitus type 2, and obesity BMI 32 who presents today with complaints of a left toe infection.  It is severe.  It is associated with purulent drainage, malodor, and pain.  He denies fever, chills, nausea, vomiting, diarrhea, dizziness, chest pain, shortness of breath, loss of consciousness.  He has known injury to the toe.  He states he  took off his today issues about 2 weeks ago and noticeable ulcerations status toe.  He went to the pharmacy and ask pharmacist for recommendations and was recommended triple antibiotic ointment and soaks for the toe which he did with no improvement.  He does not have a podiatrist.  He states his glucoses have been out of control over the last 2 weeks foot due to this infection.  He does not have an endocrinologist.  X-ray of foot in the ED revealed: Osteomyelitis of great toe proximal and distal phalanges. Pathologic fracture of great toe distal phalanx  Procedure(s) (LRB):  AMPUTATION, TOE (Left)    Hospital Course:   Patient admitted with Diabetic ulcer of Left Great toe and associated acute Osteomyelitis  Pt underwent L great toe amputation  Later pt was discharged to home with HH /wound vac  Extensive education was provided regarding his Uncontrolled DM type 2            Systemic Doctor: Veena Stroud MD  Date Last Seen: about 4 months ago  Blood Sugar: doesn't check  Hemoglobin A1c: 16.4 (2/15/2022)        Patient should call the office immediately if any signs of infection, such as fever, chills, sweats, increased redness or pain.     Patient was instructed to call the clinic or go to the emergency department if their symptoms do not improve, worsens, or if new symptoms develop.  Patient was advised that if any increased swelling, pain, or numbness arise to go immediately to the ED. Patient knows to call any time if an emergency arises. Shared decision making occurred and patient verbalized understanding in agreement with this plan.         Counseling/Education:  I provided patient education verbally regarding:   The aspects of diabetes and how it pertains to the feet. I explained the importance of proper diabetic foot care and how it is essential for the health of their feet.     I discussed the importance of knowing their Hemoglobin A1c and that the level needs to be as close to 6 as possible. I discussed  the increase complications of high blood sugar including stroke, blindness, heart attack, kidney failure and loss of limb secondary to neuropathy and PVD.      With neuropathy, beware of any breaks in the skin or redness. These areas are not recognized early due to the numbness.     I discussed Diabetes, lower back issues, metabolic disorders, systemic causes, chemotherapy, vitamin deficiency, heavy metal exposure, as some of the causes. I also explained that as much as 40% of the time we can not find a cause. I discussed different treatments available to control the symptoms but which may not cure the problem.         Counseled patient on the aspects of diabetes and how it pertains to the feet.  I explained the importance of proper diabetic foot care and how it is essential for the health of their feet.        Shoe inspection. Patient instructed on proper foot hygeine. We discussed wearing proper shoe gear, daily foot inspections, never walking without protective shoe gear, never putting sharp instruments to feet, routine podiatric nail visits every 2-3 months.       I counseled the patient on their conditions, their implications and medical management.     >50% of this > 60 minute visit was spent face to face educating/counseling the patient     I spent a total of 60 minutes on the day of the visit.This includes face to face time and non-face to face time preparing to see the patient (eg, review of tests), obtaining and/or reviewing separately obtained history, documenting clinical information in the electronic or other health record, independently interpreting results and communicating results to the patient/family/caregiver, or care coordinator.        Much of the documentation for this visit was completed in the Wound Docs system.  Please see the attached documentation for further details about the patient's care. Scanned under the Media tab.         Kirsty Rodríguez DPM     Vitals:    05/13/22 0807   BP: (!)  154/76   Pulse: 89   Resp: 18   Temp: 96.8 °F (36 °C)   TempSrc: Skin   PainSc: 0-No pain      Shoe Size:     Past Surgical History:   Procedure Laterality Date    COLONOSCOPY  prior to 2007    COLONOSCOPY N/A 3/31/2016    Procedure: COLONOSCOPY;  Surgeon: Perry Rodriguez MD;  Location: [facility];  Service: Endoscopy;  Laterality: N/A;    SHOULDER SURGERY Left     TOE AMPUTATION Left 2/17/2022    Procedure: AMPUTATION, TOE;  Surgeon: Jesse Rodríguez DPM;  Location: [facility] OR;  Service: Podiatry;  Laterality: Left;    UPPER GASTROINTESTINAL ENDOSCOPY       Past Medical History:   Diagnosis Date    Diabetes     Former smoker     HTN (hypertension)      Family History   Problem Relation Age of Onset    Brain cancer Brother 54    Esophageal cancer Paternal Grandfather 87    Colon polyps Neg Hx         Social History:   Marital Status:   Alcohol History:  reports current alcohol use.  Tobacco History:  reports that he has quit smoking. He has never used smokeless tobacco.  Drug History:  reports no history of drug use.    Review of patient's allergies indicates:  No Known Allergies    Current Outpatient Medications   Medication Sig Dispense Refill    aspirin 81 MG Chew Take 81 mg by mouth once daily.      blood sugar diagnostic Strp 100 each.  Supply whatever insurance covers. 100 each 11    doxycycline (MONODOX) 100 MG capsule Take 1 capsule (100 mg total) by mouth 2 (two) times daily. 120 capsule 0    hydroCHLOROthiazide (HYDRODIURIL) 25 MG tablet Take 25 mg by mouth every morning.      HYDROcodone-acetaminophen (NORCO) 5-325 mg per tablet Take 1 tablet by mouth every 6 (six) hours as needed for Pain. 30 tablet 0    insulin  unit/mL injection Inject 25 Units into the skin 2 (two) times daily before meals.      insulin regular 100 unit/mL Inj injection Inject 25 Units into the skin 2 (two) times daily before meals.      losartan (COZAAR) 100 MG tablet Take 100 mg by mouth once daily.       pravastatin (PRAVACHOL) 40 MG tablet Take 40 mg by mouth once daily.      spironolactone (ALDACTONE) 50 MG tablet Take 50 mg by mouth once daily. med on hold       No current facility-administered medications for this visit.       Review of Systems   Constitutional: Negative for chills, fatigue, fever and unexpected weight change.   HENT: Negative for hearing loss and trouble swallowing.    Eyes: Negative for photophobia and visual disturbance.   Respiratory: Negative for cough, shortness of breath and wheezing.    Cardiovascular: Negative for chest pain, palpitations and leg swelling.   Gastrointestinal: Negative for abdominal pain and nausea.   Genitourinary: Negative for dysuria and frequency.   Musculoskeletal: Negative for arthralgias, back pain, gait problem, joint swelling and myalgias.   Skin: Positive for wound. Negative for rash.   Neurological: Negative for tremors, seizures, weakness, numbness and headaches.   Hematological: Does not bruise/bleed easily.         Objective:        Physical Exam:   Foot Exam  Physical Exam  Ortho/SPM Exam     Imaging:            Assessment:       1. Ulcer of left foot with necrosis of bone    2. Ulcer of left foot with fat layer exposed    3. Osteomyelitis of great toe of left foot    4. History of amputation of left foot    5. Obesity (BMI 30.0-34.9)    6. Decreased pedal pulses    7. At high risk for inadequate nutritional intake    8. Uncontrolled type 2 diabetes mellitus with hyperglycemia    9. History of ulcer of lower extremity      Plan:   Ulcer of left foot with necrosis of bone    Ulcer of left foot with fat layer exposed    Osteomyelitis of great toe of left foot  -     doxycycline (MONODOX) 100 MG capsule; Take 1 capsule (100 mg total) by mouth 2 (two) times daily.  Dispense: 120 capsule; Refill: 0    History of amputation of left foot    Obesity (BMI 30.0-34.9)    Decreased pedal pulses    At high risk for inadequate nutritional intake    Uncontrolled  type 2 diabetes mellitus with hyperglycemia    History of ulcer of lower extremity      Follow up in about 2 weeks (around 5/27/2022).    Procedures          Counseling:     I provided patient education verbally regarding:   Patient diagnosis, treatment options, as well as alternatives, risks, and benefits.     This note was created using Dragon voice recognition software that occasionally misinterpreted phrases or words.

## 2022-05-18 ENCOUNTER — OFFICE VISIT (OUTPATIENT)
Dept: INFECTIOUS DISEASES | Facility: CLINIC | Age: 72
End: 2022-05-18
Payer: MEDICARE

## 2022-05-18 VITALS
DIASTOLIC BLOOD PRESSURE: 68 MMHG | BODY MASS INDEX: 33.42 KG/M2 | OXYGEN SATURATION: 98 % | TEMPERATURE: 100 F | WEIGHT: 252.19 LBS | HEART RATE: 70 BPM | HEIGHT: 73 IN | SYSTOLIC BLOOD PRESSURE: 132 MMHG

## 2022-05-18 DIAGNOSIS — M86.9 OSTEOMYELITIS OF GREAT TOE OF LEFT FOOT: Primary | ICD-10-CM

## 2022-05-18 DIAGNOSIS — Z79.4 TYPE 2 DIABETES MELLITUS TREATED WITH INSULIN: ICD-10-CM

## 2022-05-18 DIAGNOSIS — E11.9 TYPE 2 DIABETES MELLITUS TREATED WITH INSULIN: ICD-10-CM

## 2022-05-18 DIAGNOSIS — I77.9 ARTERIAL DISEASE: ICD-10-CM

## 2022-05-18 PROCEDURE — 99214 PR OFFICE/OUTPT VISIT, EST, LEVL IV, 30-39 MIN: ICD-10-PCS | Mod: S$GLB,,, | Performed by: INTERNAL MEDICINE

## 2022-05-18 PROCEDURE — 99214 OFFICE O/P EST MOD 30 MIN: CPT | Mod: S$GLB,,, | Performed by: INTERNAL MEDICINE

## 2022-05-18 NOTE — PATIENT INSTRUCTIONS
Please see vascular surgeon    If wound is completely closed, feel free to cancel next appointment    If wound opens up , call me ASAP    Follow up wit cardiologist as we discussed last time

## 2022-05-18 NOTE — PROGRESS NOTES
"  Patient ID: Bandar Tomas is a 71 y.o. male.    Chief Complaint:: Follow-up    HPI 71-year-old man with past medical history of obesity, BMI 32, HTN, D LP, DM, uncontrolled, on insulin NPH/regular, hemoglobin A1c 16, used to live in [place] and used to follow at [facility], he has moved to [place] to be close to his daughter and grandchildren.    In the beginning of February he noticed a blister over the left 1st toe which got progressively worse then he needed left great toe amputation on 02/17/2022, by Dr. Jesse Rodríguez. "Due to the degree of soft tissue necrosis around the metatarsophalangeal joint the incision was not able to be fully primarily closed."      Patient was following with Dr. Kirsty Rodríguez.  The wound needed a wound VAC and was not healing properly.  Cultures obtained on 03/18/2022 showed Staphylococcus Simulans, and Corynebacterium stratum  Patient has been on doxycycline.  He missed an appointment with me due to transportation  He feels that the wound is steadily improving    04/07/2022  He has wound vac on, He takes pictures for me when wound vac is taken down  Wound is doing well  No systemic signs of infection  04/04-- d1 dalvance is already received  04/11-- it will be d8 of dalvance    Doxycycline I had ordered on prior visited for whatever reason had not gone through to pharmacy.  I gave a handwritten prescription to patient today.    04/28/2022  Patient is pleased with his wound healing.  Wound care nurse as well is pleased with wound healing.  I looked at it and it is looking great.  He has received 2 doses of dull Field and will receive the 3rd dose on May 5th.  He is taking doxycycline twice a day appropriately and has enough till May 30th.  I asked him to complete and then stop.  He looked short winded just doing minimal movement.  Patient has history of sleep apnea and was a heavy smoker, he used to be on BiPAP and on a breathing treatment which he does not know the name.  I advised " that he needs to be worked up from cardiac and pulmonary standpoint to keep him healthy.    He is working hard on controlling his diabetes.  He is motivated and proactive, eating healthy are.     Potassium was elevated last time and spironolactone has been held.  He will follow with his PCP.  Blood pressure today is not perfect but not terrible here, 144 systolic     05/18/2022.  Right foot wound is healing.  He does not need a wound VAC anymore.  No signs and symptoms of systemic infection.  T-max was 99.7°.  I believe it was because he was walking up and about and wearing mask.  He missed his appointment with cardiologist but is scheduling another appointment.        Review of patient's allergies indicates:  No Known Allergies  Past Medical History:   Diagnosis Date    Diabetes     Former smoker     HTN (hypertension)      Past Surgical History:   Procedure Laterality Date    COLONOSCOPY  prior to 2007    COLONOSCOPY N/A 3/31/2016    Procedure: COLONOSCOPY;  Surgeon: Perry Rodriguez MD;  [facility];  Service: Endoscopy;  Laterality: N/A;    SHOULDER SURGERY Left     TOE AMPUTATION Left 2/17/2022    Procedure: AMPUTATION, TOE;  Surgeon: Jesse Rodríguez DPM;  Location: [facility] OR;  Service: Podiatry;  Laterality: Left;    UPPER GASTROINTESTINAL ENDOSCOPY       Social History     Tobacco Use    Smoking status: Former Smoker    Smokeless tobacco: Never Used   Substance Use Topics    Alcohol use: Yes     Alcohol/week: 0.0 standard drinks        Hunting:  Fishing:  Pets:  Exposure to sick contacts:  Exposure to TB:  Travel:     Family History   Problem Relation Age of Onset    Brain cancer Brother 54    Esophageal cancer Paternal Grandfather 87    Colon polyps Neg Hx        Review of Systems   Constitutional:Negative  No appetite change (trying to eat healthy, ), chills, diaphoresis, fatigue and fever. Activity change: tired , not moving much since he has been retired   HENT: Negative for  "congestion, dental problem, ear pain, hearing loss, mouth sores, postnasal drip, rhinorrhea, sinus pain, sore throat and trouble swallowing.    Eyes: Negative for photophobia, pain, discharge, redness, itching and visual disturbance.   Respiratory: Negative for apnea, cough, choking, chest tightness, shortness of breath, wheezing and stridor.   history of sleep apnea  Cardiovascular: Negative for chest pain, palpitations and leg swelling.    Gastrointestinal: Negative for abdominal distention, abdominal pain, anal bleeding, blood in stool, constipation, diarrhea, nausea, rectal pain and vomiting.   Endocrine:     Dm x18  Genitourinary: Negative for decreased urine volume, difficulty urinating, dysuria, enuresis, flank pain, frequency, genital sores, hematuria, penile discharge, penile pain, penile swelling, scrotal swelling, testicular pain and urgency.        BPH sp surgery    Musculoskeletal: Negative for arthralgias, back pain (not any more, sp surgery, . left shoulder surgery, by dr Sam), gait problem, joint swelling, myalgias, neck pain and neck stiffness.   Skin: Positive for wound Negative for color change, pallor and rash.   Allergic/Immunologic: Negative for environmental allergies, food allergies and immunocompromised state.   Neurological: Negative for dizziness, tremors, seizures, syncope, facial asymmetry, speech difficulty, weakness, light-headedness, numbness and headaches.   Hematological: Negative for adenopathy. Does not bruise/bleed easily.   Psychiatric/Behavioral: Negative for agitation, behavioral problems, confusion, decreased concentration, dysphoric mood, hallucinations, self-injury, sleep disturbance and suicidal ideas. The patient is not nervous/anxious and is not hyperactive.         Pertinent medications noted:     Objective:      Blood pressure 132/68, pulse 70, temperature 99.7 °F (37.6 °C), height 6' 1" (1.854 m), weight 114.4 kg (252 lb 3.2 oz), SpO2 98 %.  Body mass index is 33.27 " kg/m².  Physical Exam  Constitutional:       General: He is not in acute distress.     Appearance: He is not diaphoretic.   HENT:      Head: Atraumatic.      Right Ear: External ear normal.      Left Ear: External ear normal.      Nose: Nose normal.   Eyes:      General: No scleral icterus.     Conjunctiva/sclera: Conjunctivae normal.      Pupils: Pupils are equal, round, and reactive to light.   Neck:      Vascular: No JVD.   Cardiovascular:      Rate and Rhythm: Normal rate and regular rhythm.      Heart sounds: Normal heart sounds.   Pulmonary:      Effort: Pulmonary effort is normal.      Breath sounds: Normal breath sounds. No wheezing or rales.   Chest:      Chest wall: No tenderness.   Abdominal:      General: Bowel sounds are normal. There is no distension.      Palpations: Abdomen is soft. There is no mass.      Tenderness: There is no abdominal tenderness. There is no guarding or rebound.   Musculoskeletal:         General: Normal range of motion.      Cervical back: Normal range of motion and neck supple.   Lymphadenopathy:      Cervical: No cervical adenopathy.   Skin:     General: Skin is warm and dry.      Findings: No erythema or rash.   Neurological:      Mental Status: He is alert and oriented to person, place, and time.      Cranial Nerves: No cranial nerve deficit.   Psychiatric:         Behavior: Behavior normal.         Thought Content: Thought content normal.         VAD:  None  05/18/2022 04/28/2022                Prior pictures                02/14        Prior pics                            General Labs reviewed:  Lab Results   Component Value Date    WBC 6.93 05/13/2022    RBC 4.55 (L) 05/13/2022    HGB 11.0 (L) 05/13/2022    HCT 36.3 (L) 05/13/2022    MCV 80 (L) 05/13/2022    MCH 24.2 (L) 05/13/2022    MCHC 30.3 (L) 05/13/2022    RDW 18.0 (H) 05/13/2022     05/13/2022    MPV 10.0 05/13/2022    GRAN 3.8 05/13/2022    GRAN 54.3 05/13/2022    LYMPH 2.4 05/13/2022    LYMPH  33.9 05/13/2022    MONO 0.6 05/13/2022    MONO 8.8 05/13/2022    EOS 0.1 05/13/2022    BASO 0.07 05/13/2022    EOSINOPHIL 1.7 05/13/2022    BASOPHIL 1.0 05/13/2022     CMP  Sodium   Date Value Ref Range Status   05/13/2022 141 136 - 145 mmol/L Final     Potassium   Date Value Ref Range Status   05/13/2022 4.5 3.5 - 5.1 mmol/L Final     Chloride   Date Value Ref Range Status   05/13/2022 105 95 - 110 mmol/L Final     CO2   Date Value Ref Range Status   05/13/2022 27 23 - 29 mmol/L Final     Glucose   Date Value Ref Range Status   05/13/2022 56 (LL) 70 - 110 mg/dL Final     Comment:     Glucose critical result(s) repeated. Called and verbal readback   obtained from Dr. Jimenez (on-call for Dr. Lopez) by [id number] 05/13/2022   15:04       BUN   Date Value Ref Range Status   05/13/2022 24 (H) 8 - 23 mg/dL Final     Creatinine   Date Value Ref Range Status   05/13/2022 1.2 0.5 - 1.4 mg/dL Final     Calcium   Date Value Ref Range Status   05/13/2022 9.0 8.7 - 10.5 mg/dL Final     Total Protein   Date Value Ref Range Status   05/13/2022 7.9 6.0 - 8.4 g/dL Final     Albumin   Date Value Ref Range Status   05/13/2022 4.3 3.5 - 5.2 g/dL Final     Total Bilirubin   Date Value Ref Range Status   05/13/2022 0.4 0.1 - 1.0 mg/dL Final     Comment:     For infants and newborns, interpretation of results should be based  on gestational age, weight and in agreement with clinical  observations.    Premature Infant recommended reference ranges:  Up to 24 hours.............<8.0 mg/dL  Up to 48 hours............<12.0 mg/dL  3-5 days..................<15.0 mg/dL  6-29 days.................<15.0 mg/dL       Alkaline Phosphatase   Date Value Ref Range Status   05/13/2022 67 55 - 135 U/L Final     AST   Date Value Ref Range Status   05/13/2022 25 10 - 40 U/L Final     ALT   Date Value Ref Range Status   05/13/2022 29 10 - 44 U/L Final     Anion Gap   Date Value Ref Range Status   05/13/2022 9 8 - 16 mmol/L Final     eGFR if     Date Value Ref Range Status   05/13/2022 >60.0 >60 mL/min/1.73 m^2 Final     eGFR if non    Date Value Ref Range Status   05/13/2022 >60.0 >60 mL/min/1.73 m^2 Final     Comment:     Calculation used to obtain the estimated glomerular filtration  rate (eGFR) is the CKD-EPI equation.          Micro:  Microbiology Results (last 7 days)     ** No results found for the last 168 hours. **        Imaging Reviewed:    Assessment:       1. Osteomyelitis of great toe of left foot    2. Type 2 diabetes mellitus treated with insulin    3. Arterial disease           Plan:       Osteomyelitis of great toe of left foot    Type 2 diabetes mellitus treated with insulin  Comments:  Patient is aware he needs to better controlled diabetes in order for the wounds to heal.    Arterial disease  Comments:  Patient is aware he needs to follow with vascular surgeon.  He did not remember he saw Dr. Becker in the hospital in February  Orders:  -     Ambulatory referral/consult to Vascular Surgery; Future; Expected date: 05/25/2022      Follow up in about 3 weeks (around 6/8/2022), or if symptoms worsen or fail to improve.      Osteomyelitis of the left 1st metatarsal.  Status post left 1st toe amputation on 02/17/2022.  Those cultures grew Enterococcus and Proteus(this infection was surgically removed).  Patient was given levofloxacin    Surgical wound was cultured later in the office on 03/18/2022--it grew corynebacterium striatum and Staphylococcus Simulans  I gave Dalvance 04/04, 04/11, 05/05 +  Doxycycline (03/31--05/18)    No more abx  Follow with wound care    This note was created using voice recognition software that occasionally misinterpreted phrases or words.

## 2022-05-27 ENCOUNTER — OFFICE VISIT (OUTPATIENT)
Dept: WOUND CARE | Facility: HOSPITAL | Age: 72
End: 2022-05-27
Attending: PODIATRIST
Payer: MEDICARE

## 2022-05-27 VITALS
TEMPERATURE: 98 F | DIASTOLIC BLOOD PRESSURE: 83 MMHG | HEART RATE: 94 BPM | RESPIRATION RATE: 18 BRPM | SYSTOLIC BLOOD PRESSURE: 175 MMHG

## 2022-05-27 DIAGNOSIS — R09.89 DECREASED PEDAL PULSES: ICD-10-CM

## 2022-05-27 DIAGNOSIS — L08.9 DIABETIC FOOT INFECTION: ICD-10-CM

## 2022-05-27 DIAGNOSIS — E11.65 UNCONTROLLED TYPE 2 DIABETES MELLITUS WITH HYPERGLYCEMIA: ICD-10-CM

## 2022-05-27 DIAGNOSIS — Z91.89 AT HIGH RISK FOR INADEQUATE NUTRITIONAL INTAKE: ICD-10-CM

## 2022-05-27 DIAGNOSIS — Z89.432 HISTORY OF AMPUTATION OF LEFT FOOT: ICD-10-CM

## 2022-05-27 DIAGNOSIS — L97.524 ULCER OF LEFT FOOT WITH NECROSIS OF BONE: Primary | ICD-10-CM

## 2022-05-27 DIAGNOSIS — E66.9 OBESITY (BMI 30.0-34.9): ICD-10-CM

## 2022-05-27 DIAGNOSIS — L97.522 ULCER OF LEFT FOOT WITH FAT LAYER EXPOSED: ICD-10-CM

## 2022-05-27 DIAGNOSIS — L97.513 ULCER OF RIGHT FOOT WITH NECROSIS OF MUSCLE: ICD-10-CM

## 2022-05-27 DIAGNOSIS — E11.628 DIABETIC FOOT INFECTION: ICD-10-CM

## 2022-05-27 DIAGNOSIS — M86.9 OSTEOMYELITIS OF GREAT TOE OF LEFT FOOT: ICD-10-CM

## 2022-05-27 DIAGNOSIS — Z87.2 HISTORY OF ULCER OF LOWER EXTREMITY: ICD-10-CM

## 2022-05-27 PROCEDURE — 11042 DBRDMT SUBQ TIS 1ST 20SQCM/<: CPT | Mod: ,,, | Performed by: PODIATRIST

## 2022-05-27 PROCEDURE — 99214 OFFICE O/P EST MOD 30 MIN: CPT | Mod: 25,,, | Performed by: PODIATRIST

## 2022-05-27 PROCEDURE — 11042 PR DEBRIDEMENT, SKIN, SUB-Q TISSUE,=<20 SQ CM: ICD-10-PCS | Mod: ,,, | Performed by: PODIATRIST

## 2022-05-27 PROCEDURE — 99214 PR OFFICE/OUTPT VISIT, EST, LEVL IV, 30-39 MIN: ICD-10-PCS | Mod: 25,,, | Performed by: PODIATRIST

## 2022-05-27 PROCEDURE — 11042 DBRDMT SUBQ TIS 1ST 20SQCM/<: CPT | Performed by: PODIATRIST

## 2022-05-27 NOTE — PROGRESS NOTES
[address]  [place] [address]  Phone: [phone]   [phone]    Patient's PCP:Veena Stroud MD  Referring Provider: No ref. provider found    Subjective:      Chief Complaint:: Wound Care, Non-healing Wound, Diabetic Foot Ulcer, Diabetes, and Wound Check    HPI  Presents for follow-up of left foot ulcers.       Additionally, patient presents for follow-up of right plantar foot ulcer.  Right plantar foot ulcer is healed.  Minimal callus overlying healed ulcer.  No signs infection.  No openings.     Left dorsal foot ulcer is now healed.  Please see Wound docs for full assessment and evaluation of healed left dorsal foot ulcer.  Left great toe foot ulcer still open           1. Debridement See Wound Docs for assessment of wounds and procedure notes  2. Continue taking all medications as prescribed, doxycycline  3. Continue home health dressing changes, collagen followed by bulky wrap  4. RTC one week   5. Counseled patient on increasing protein intake, not getting wounds wet, keeping dressings clean dry and intact, following a healthy diet, elevating legs when able, removing pressure from wounds     Total time spent for E&M 30  Total time for debridement 35 minutes      Athlete's foot counseling: Counseled patient that it is important to keep the feet dry. Use absorbent cotton socks and change them if they become sweaty. Or wear an open-toe shoe or sandal. Wash the feet at least once a day with soap and water. Apply the antifungal cream as prescribed. Recommend spray antiperspirant to the feet. Some antifungal creams are available without a prescription. It may take a week before the rash starts to improve. It can take about 3 to 4 weeks to completely clear. Continue the medicine until the rash is all gone. Use over-the-counter antifungal powders or sprays on your feet after exposure to high-risk environments, such as public showers, gyms, and locker rooms. This can help prevent future  infections. Wearing appropriate shoes in these situations can help.        Fungal infection of skin explained. Treatment options including no treatment, topical medications, oral medications were discussed, as well as success rates and risks of recurrence. We agreed on topical medication         Bandar Tomas is a 71 y.o. male  with past medical history of hypertension, hyperlipidemia, uncontrolled diabetes, diabetic neuropathy who presents to clinic with a complaint of left great toe ulcer status post amputation of left great toe lasting for approximately 2 weeks. Onset of symptoms was initially patient had a severe infected wound on the left foot and was admitted to the hospital.  See discharge summary below.  He underwent a left great toe amputation with keeping the amputation site open due to not having enough viable skin for closure.  A wound VAC was started.  Referral to wound care was placed.     Upon presentation today, patient also has a open wound on the plantar right foot.  Patient states he does not check his feet so is unsure how long this has been present.  Patient's daughter is bedside and states patient lives alone and is not compliant with his diabetic medication, checking his feet, and additional recommendations by his doctors.  Counseled patient on the need to be compliant.  Counseled patient that if he is not compliant he is risking limb loss as well.        Counseled patient regarding the need to get his blood sugars under control.  Discussed with patient that with blood sugars at this high level it is very difficult to heal wounds.        Discharge summary on 02/21/2022:  HPI:   Mr. Reyes is a 71-year-old male with a past medical history of hypertension, hyperlipidemia, insulin-dependent diabetes mellitus type 2, and obesity BMI 32 who presents today with complaints of a left toe infection.  It is severe.  It is associated with purulent drainage, malodor, and pain.  He denies fever, chills,  nausea, vomiting, diarrhea, dizziness, chest pain, shortness of breath, loss of consciousness.  He has known injury to the toe.  He states he took off his today issues about 2 weeks ago and noticeable ulcerations status toe.  He went to the pharmacy and ask pharmacist for recommendations and was recommended triple antibiotic ointment and soaks for the toe which he did with no improvement.  He does not have a podiatrist.  He states his glucoses have been out of control over the last 2 weeks foot due to this infection.  He does not have an endocrinologist.  X-ray of foot in the ED revealed: Osteomyelitis of great toe proximal and distal phalanges. Pathologic fracture of great toe distal phalanx  Procedure(s) (LRB):  AMPUTATION, TOE (Left)    Hospital Course:   Patient admitted with Diabetic ulcer of Left Great toe and associated acute Osteomyelitis  Pt underwent L great toe amputation  Later pt was discharged to home with HH /wound vac  Extensive education was provided regarding his Uncontrolled DM type 2            Systemic Doctor: Veena Stroud MD  Date Last Seen: about 4 months ago  Blood Sugar: doesn't check  Hemoglobin A1c: 16.4 (2/15/2022)        Patient should call the office immediately if any signs of infection, such as fever, chills, sweats, increased redness or pain.     Patient was instructed to call the clinic or go to the emergency department if their symptoms do not improve, worsens, or if new symptoms develop.  Patient was advised that if any increased swelling, pain, or numbness arise to go immediately to the ED. Patient knows to call any time if an emergency arises. Shared decision making occurred and patient verbalized understanding in agreement with this plan.         Counseling/Education:  I provided patient education verbally regarding:   The aspects of diabetes and how it pertains to the feet. I explained the importance of proper diabetic foot care and how it is essential for the health of  their feet.     I discussed the importance of knowing their Hemoglobin A1c and that the level needs to be as close to 6 as possible. I discussed the increase complications of high blood sugar including stroke, blindness, heart attack, kidney failure and loss of limb secondary to neuropathy and PVD.      With neuropathy, beware of any breaks in the skin or redness. These areas are not recognized early due to the numbness.     I discussed Diabetes, lower back issues, metabolic disorders, systemic causes, chemotherapy, vitamin deficiency, heavy metal exposure, as some of the causes. I also explained that as much as 40% of the time we can not find a cause. I discussed different treatments available to control the symptoms but which may not cure the problem.         Counseled patient on the aspects of diabetes and how it pertains to the feet.  I explained the importance of proper diabetic foot care and how it is essential for the health of their feet.        Shoe inspection. Patient instructed on proper foot hygeine. We discussed wearing proper shoe gear, daily foot inspections, never walking without protective shoe gear, never putting sharp instruments to feet, routine podiatric nail visits every 2-3 months.       I counseled the patient on their conditions, their implications and medical management.     >50% of this > 60 minute visit was spent face to face educating/counseling the patient     I spent a total of 60 minutes on the day of the visit.This includes face to face time and non-face to face time preparing to see the patient (eg, review of tests), obtaining and/or reviewing separately obtained history, documenting clinical information in the electronic or other health record, independently interpreting results and communicating results to the patient/family/caregiver, or care coordinator.        Much of the documentation for this visit was completed in the Wound Docs system.  Please see the attached documentation  for further details about the patient's care. Scanned under the Media tab.         Kirsty Rodríguez DPM     Vitals:    05/27/22 0809   BP: (!) 175/83   Pulse: 94   Resp: 18   Temp: 97.5 °F (36.4 °C)   TempSrc: Skin   PainSc: 0-No pain      Shoe Size:     Past Surgical History:   Procedure Laterality Date    COLONOSCOPY  prior to 2007    COLONOSCOPY N/A 3/31/2016    Procedure: COLONOSCOPY;  Surgeon: Perry Rodriguez MD;  Location: [facility];  Service: Endoscopy;  Laterality: N/A;    SHOULDER SURGERY Left     TOE AMPUTATION Left 2/17/2022    Procedure: AMPUTATION, TOE;  Surgeon: Jesse Rodríguez DPM;  [facility] OR;  Service: Podiatry;  Laterality: Left;    UPPER GASTROINTESTINAL ENDOSCOPY       Past Medical History:   Diagnosis Date    Diabetes     Former smoker     HTN (hypertension)      Family History   Problem Relation Age of Onset    Brain cancer Brother 54    Esophageal cancer Paternal Grandfather 87    Colon polyps Neg Hx         Social History:   Marital Status:   Alcohol History:  reports current alcohol use.  Tobacco History:  reports that he has quit smoking. He has never used smokeless tobacco.  Drug History:  reports no history of drug use.    Review of patient's allergies indicates:  No Known Allergies    Current Outpatient Medications   Medication Sig Dispense Refill    aspirin 81 MG Chew Take 81 mg by mouth once daily.      blood sugar diagnostic Strp 100 each.  Supply whatever insurance covers. 100 each 11    doxycycline (MONODOX) 100 MG capsule Take 1 capsule (100 mg total) by mouth 2 (two) times daily. 120 capsule 0    hydroCHLOROthiazide (HYDRODIURIL) 25 MG tablet Take 25 mg by mouth every morning.      HYDROcodone-acetaminophen (NORCO) 5-325 mg per tablet Take 1 tablet by mouth every 6 (six) hours as needed for Pain. 30 tablet 0    insulin  unit/mL injection Inject 25 Units into the skin 2 (two) times daily before meals.      insulin regular 100 unit/mL Inj  injection Inject 25 Units into the skin 2 (two) times daily before meals.      losartan (COZAAR) 100 MG tablet Take 100 mg by mouth once daily.      pravastatin (PRAVACHOL) 40 MG tablet Take 40 mg by mouth once daily.      spironolactone (ALDACTONE) 50 MG tablet Take 50 mg by mouth once daily. med on hold       No current facility-administered medications for this visit.       Review of Systems   Constitutional: Negative for chills, fatigue, fever and unexpected weight change.   HENT: Negative for hearing loss and trouble swallowing.    Eyes: Negative for photophobia and visual disturbance.   Respiratory: Negative for cough, shortness of breath and wheezing.    Cardiovascular: Negative for chest pain, palpitations and leg swelling.   Gastrointestinal: Negative for abdominal pain and nausea.   Genitourinary: Negative for dysuria and frequency.   Musculoskeletal: Negative for arthralgias, back pain, gait problem, joint swelling and myalgias.   Skin: Positive for wound. Negative for rash.   Neurological: Negative for tremors, seizures, weakness, numbness and headaches.   Hematological: Does not bruise/bleed easily.         Objective:        Physical Exam:   Foot Exam  Physical Exam  Ortho/SPM Exam     Imaging:            Assessment:       1. Ulcer of left foot with necrosis of bone    2. Ulcer of left foot with fat layer exposed    3. Osteomyelitis of great toe of left foot    4. Obesity (BMI 30.0-34.9)    5. History of amputation of left foot    6. At high risk for inadequate nutritional intake    7. Uncontrolled type 2 diabetes mellitus with hyperglycemia    8. History of ulcer of lower extremity    9. Decreased pedal pulses    10. Ulcer of right foot with necrosis of muscle    11. Diabetic foot infection      Plan:   Ulcer of left foot with necrosis of bone    Ulcer of left foot with fat layer exposed    Osteomyelitis of great toe of left foot    Obesity (BMI 30.0-34.9)    History of amputation of left foot    At  high risk for inadequate nutritional intake    Uncontrolled type 2 diabetes mellitus with hyperglycemia    History of ulcer of lower extremity    Decreased pedal pulses    Ulcer of right foot with necrosis of muscle    Diabetic foot infection      Follow up in about 1 week (around 6/3/2022).    Procedures          Counseling:     I provided patient education verbally regarding:   Patient diagnosis, treatment options, as well as alternatives, risks, and benefits.     This note was created using Dragon voice recognition software that occasionally misinterpreted phrases or words.

## 2022-06-03 ENCOUNTER — OFFICE VISIT (OUTPATIENT)
Dept: WOUND CARE | Facility: HOSPITAL | Age: 72
End: 2022-06-03
Attending: PODIATRIST
Payer: MEDICARE

## 2022-06-03 VITALS
HEART RATE: 89 BPM | SYSTOLIC BLOOD PRESSURE: 151 MMHG | RESPIRATION RATE: 19 BRPM | DIASTOLIC BLOOD PRESSURE: 79 MMHG | TEMPERATURE: 98 F

## 2022-06-03 DIAGNOSIS — B35.3 TINEA PEDIS OF BOTH FEET: ICD-10-CM

## 2022-06-03 DIAGNOSIS — R23.4 FISSURE IN SKIN OF BOTH FEET: ICD-10-CM

## 2022-06-03 DIAGNOSIS — L97.524 ULCER OF LEFT FOOT WITH NECROSIS OF BONE: Primary | ICD-10-CM

## 2022-06-03 DIAGNOSIS — M86.9 OSTEOMYELITIS OF GREAT TOE OF LEFT FOOT: ICD-10-CM

## 2022-06-03 DIAGNOSIS — Z89.432 HISTORY OF AMPUTATION OF LEFT FOOT: ICD-10-CM

## 2022-06-03 DIAGNOSIS — Z91.89 AT HIGH RISK FOR INADEQUATE NUTRITIONAL INTAKE: ICD-10-CM

## 2022-06-03 DIAGNOSIS — L97.522 ULCER OF LEFT FOOT WITH FAT LAYER EXPOSED: ICD-10-CM

## 2022-06-03 DIAGNOSIS — Z87.2 HISTORY OF ULCER OF LOWER EXTREMITY: ICD-10-CM

## 2022-06-03 DIAGNOSIS — E11.65 UNCONTROLLED TYPE 2 DIABETES MELLITUS WITH HYPERGLYCEMIA: ICD-10-CM

## 2022-06-03 DIAGNOSIS — R09.89 DECREASED PEDAL PULSES: ICD-10-CM

## 2022-06-03 DIAGNOSIS — E66.9 OBESITY (BMI 30.0-34.9): ICD-10-CM

## 2022-06-03 DIAGNOSIS — L97.513 ULCER OF RIGHT FOOT WITH NECROSIS OF MUSCLE: ICD-10-CM

## 2022-06-03 PROCEDURE — 99214 PR OFFICE/OUTPT VISIT, EST, LEVL IV, 30-39 MIN: ICD-10-PCS | Mod: ,,, | Performed by: PODIATRIST

## 2022-06-03 PROCEDURE — 99214 OFFICE O/P EST MOD 30 MIN: CPT | Mod: ,,, | Performed by: PODIATRIST

## 2022-06-03 PROCEDURE — 99214 OFFICE O/P EST MOD 30 MIN: CPT | Performed by: PODIATRIST

## 2022-06-03 NOTE — PROGRESS NOTES
[address] [facility] [address]  [place] [address]  Phone: [phone]   [phone]    Patient's PCP:Veena Stroud MD  Referring Provider: No ref. provider found    Subjective:      Chief Complaint:: Wound Care, Non-healing Wound, Diabetic Foot Ulcer, Diabetes, and Wound Check    HPI       1. Patient is healed See Wound Docs for assessment   2. Continue taking all medications as prescribed  3. Continue home health dressing to the healed area for protection and padding   4. RTC one week   5. Counseled patient on increasing protein intake, not getting wounds wet, keeping dressings clean dry and intact, following a healthy diet, elevating legs when able, removing pressure from wounds            Additional patient instructions:     - Check feet daily for wounds and areas of irritation.     - Keep regularly scheduled appointments     - Apply moisturizing cream to feet and ankles daily but not between toes.     - Keep feet clean and dry, especially between toes.     - Elevate feet as much as possible throughout the day.     - Wear supportive/accommodative shoes at all times when ambulating.     - Wear over-the-counter compression socks at all times when ambulating.  Do not wear them while resting for prolonged periods of time such as when sleeping.     - Notify clinic immediately of any new or worsening conditions.      Athlete's foot counseling: Counseled patient that it is important to keep the feet dry. Use absorbent cotton socks and change them if they become sweaty. Or wear an open-toe shoe or sandal. Wash the feet at least once a day with soap and water. Apply the antifungal cream as prescribed. Recommend spray antiperspirant to the feet. Some antifungal creams are available without a prescription. It may take a week before the rash starts to improve. It can take about 3 to 4 weeks to completely clear. Continue the medicine until the rash is all gone. Use over-the-counter antifungal powders or sprays on  your feet after exposure to high-risk environments, such as public showers, gyms, and locker rooms. This can help prevent future infections. Wearing appropriate shoes in these situations can help.        Fungal infection of skin explained. Treatment options including no treatment, topical medications, oral medications were discussed, as well as success rates and risks of recurrence. We agreed on topical medication         Bandar Tomas is a 71 y.o. male  with past medical history of hypertension, hyperlipidemia, uncontrolled diabetes, diabetic neuropathy who presents to clinic with a complaint of left great toe ulcer status post amputation of left great toe lasting for approximately 2 weeks. Onset of symptoms was initially patient had a severe infected wound on the left foot and was admitted to the hospital.  See discharge summary below.  He underwent a left great toe amputation with keeping the amputation site open due to not having enough viable skin for closure.  A wound VAC was started.  Referral to wound care was placed.     Upon presentation today, patient also has a open wound on the plantar right foot.  Patient states he does not check his feet so is unsure how long this has been present.  Patient's daughter is bedside and states patient lives alone and is not compliant with his diabetic medication, checking his feet, and additional recommendations by his doctors.  Counseled patient on the need to be compliant.  Counseled patient that if he is not compliant he is risking limb loss as well.        Counseled patient regarding the need to get his blood sugars under control.  Discussed with patient that with blood sugars at this high level it is very difficult to heal wounds.        Discharge summary on 02/21/2022:  HPI:   Mr. Reyes is a 71-year-old male with a past medical history of hypertension, hyperlipidemia, insulin-dependent diabetes mellitus type 2, and obesity BMI 32 who presents today with complaints  of a left toe infection.  It is severe.  It is associated with purulent drainage, malodor, and pain.  He denies fever, chills, nausea, vomiting, diarrhea, dizziness, chest pain, shortness of breath, loss of consciousness.  He has known injury to the toe.  He states he took off his today issues about 2 weeks ago and noticeable ulcerations status toe.  He went to the pharmacy and ask pharmacist for recommendations and was recommended triple antibiotic ointment and soaks for the toe which he did with no improvement.  He does not have a podiatrist.  He states his glucoses have been out of control over the last 2 weeks foot due to this infection.  He does not have an endocrinologist.  X-ray of foot in the ED revealed: Osteomyelitis of great toe proximal and distal phalanges. Pathologic fracture of great toe distal phalanx  Procedure(s) (LRB):  AMPUTATION, TOE (Left)    Hospital Course:   Patient admitted with Diabetic ulcer of Left Great toe and associated acute Osteomyelitis  Pt underwent L great toe amputation  Later pt was discharged to home with HH /wound vac  Extensive education was provided regarding his Uncontrolled DM type 2            Systemic Doctor: Veena Stroud MD  Date Last Seen: about 4 months ago  Blood Sugar: doesn't check  Hemoglobin A1c: 16.4 (2/15/2022)        Patient should call the office immediately if any signs of infection, such as fever, chills, sweats, increased redness or pain.     Patient was instructed to call the clinic or go to the emergency department if their symptoms do not improve, worsens, or if new symptoms develop.  Patient was advised that if any increased swelling, pain, or numbness arise to go immediately to the ED. Patient knows to call any time if an emergency arises. Shared decision making occurred and patient verbalized understanding in agreement with this plan.         Counseling/Education:  I provided patient education verbally regarding:   The aspects of diabetes and  how it pertains to the feet. I explained the importance of proper diabetic foot care and how it is essential for the health of their feet.     I discussed the importance of knowing their Hemoglobin A1c and that the level needs to be as close to 6 as possible. I discussed the increase complications of high blood sugar including stroke, blindness, heart attack, kidney failure and loss of limb secondary to neuropathy and PVD.      With neuropathy, beware of any breaks in the skin or redness. These areas are not recognized early due to the numbness.     I discussed Diabetes, lower back issues, metabolic disorders, systemic causes, chemotherapy, vitamin deficiency, heavy metal exposure, as some of the causes. I also explained that as much as 40% of the time we can not find a cause. I discussed different treatments available to control the symptoms but which may not cure the problem.         Counseled patient on the aspects of diabetes and how it pertains to the feet.  I explained the importance of proper diabetic foot care and how it is essential for the health of their feet.        Shoe inspection. Patient instructed on proper foot hygeine. We discussed wearing proper shoe gear, daily foot inspections, never walking without protective shoe gear, never putting sharp instruments to feet, routine podiatric nail visits every 2-3 months.       I counseled the patient on their conditions, their implications and medical management.     >50% of this > 60 minute visit was spent face to face educating/counseling the patient     I spent a total of 60 minutes on the day of the visit.This includes face to face time and non-face to face time preparing to see the patient (eg, review of tests), obtaining and/or reviewing separately obtained history, documenting clinical information in the electronic or other health record, independently interpreting results and communicating results to the patient/family/caregiver, or care  coordinator.        Much of the documentation for this visit was completed in the Wound Docs system.  Please see the attached documentation for further details about the patient's care. Scanned under the Media tab.         Kirsty Rodríguez DPM        Vitals:    06/03/22 0911   BP: (!) 151/79   Pulse: 89   Resp: 19   Temp: 98.3 °F (36.8 °C)   PainSc: 0-No pain      Shoe Size:     Past Surgical History:   Procedure Laterality Date    COLONOSCOPY  prior to 2007    COLONOSCOPY N/A 3/31/2016    Procedure: COLONOSCOPY;  Surgeon: Perry Rodriguez MD;  Location: [facility];  Service: Endoscopy;  Laterality: N/A;    SHOULDER SURGERY Left     TOE AMPUTATION Left 2/17/2022    Procedure: AMPUTATION, TOE;  Surgeon: Jesse Rodríguez DPM;  Location: [facility] OR;  Service: Podiatry;  Laterality: Left;    UPPER GASTROINTESTINAL ENDOSCOPY       Past Medical History:   Diagnosis Date    Diabetes     Former smoker     HTN (hypertension)      Family History   Problem Relation Age of Onset    Brain cancer Brother 54    Esophageal cancer Paternal Grandfather 87    Colon polyps Neg Hx         Social History:   Marital Status:   Alcohol History:  reports current alcohol use.  Tobacco History:  reports that he has quit smoking. He has never used smokeless tobacco.  Drug History:  reports no history of drug use.    Review of patient's allergies indicates:  No Known Allergies    Current Outpatient Medications   Medication Sig Dispense Refill    aspirin 81 MG Chew Take 81 mg by mouth once daily.      blood sugar diagnostic Strp 100 each.  Supply whatever insurance covers. 100 each 11    doxycycline (MONODOX) 100 MG capsule Take 1 capsule (100 mg total) by mouth 2 (two) times daily. 120 capsule 0    hydroCHLOROthiazide (HYDRODIURIL) 25 MG tablet Take 25 mg by mouth every morning.      HYDROcodone-acetaminophen (NORCO) 5-325 mg per tablet Take 1 tablet by mouth every 6 (six) hours as needed for Pain. 30 tablet 0    insulin NPH  100 unit/mL injection Inject 25 Units into the skin 2 (two) times daily before meals.      insulin regular 100 unit/mL Inj injection Inject 25 Units into the skin 2 (two) times daily before meals.      losartan (COZAAR) 100 MG tablet Take 100 mg by mouth once daily.      pravastatin (PRAVACHOL) 40 MG tablet Take 40 mg by mouth once daily.      spironolactone (ALDACTONE) 50 MG tablet Take 50 mg by mouth once daily. med on hold       No current facility-administered medications for this visit.       Review of Systems   Constitutional: Negative for chills, fatigue, fever and unexpected weight change.   HENT: Negative for hearing loss and trouble swallowing.    Eyes: Negative for photophobia and visual disturbance.   Respiratory: Negative for cough, shortness of breath and wheezing.    Cardiovascular: Negative for chest pain, palpitations and leg swelling.   Gastrointestinal: Negative for abdominal pain and nausea.   Genitourinary: Negative for dysuria and frequency.   Musculoskeletal: Negative for arthralgias, back pain, gait problem, joint swelling and myalgias.   Skin: Negative for rash and wound.   Neurological: Negative for tremors, seizures, weakness, numbness and headaches.   Hematological: Does not bruise/bleed easily.         Objective:        Physical Exam:   Foot Exam  Physical Exam  Ortho/SPM Exam     Imaging:            Assessment:       1. Ulcer of left foot with necrosis of bone    2. Ulcer of left foot with fat layer exposed    3. Osteomyelitis of great toe of left foot    4. History of amputation of left foot    5. At high risk for inadequate nutritional intake    6. History of ulcer of lower extremity    7. Obesity (BMI 30.0-34.9)    8. Uncontrolled type 2 diabetes mellitus with hyperglycemia    9. Ulcer of right foot with necrosis of muscle    10. Decreased pedal pulses    11. Fissure in skin of both feet    12. Tinea pedis of both feet      Plan:   Ulcer of left foot with necrosis of  bone    Ulcer of left foot with fat layer exposed    Osteomyelitis of great toe of left foot    History of amputation of left foot    At high risk for inadequate nutritional intake    History of ulcer of lower extremity    Obesity (BMI 30.0-34.9)    Uncontrolled type 2 diabetes mellitus with hyperglycemia    Ulcer of right foot with necrosis of muscle    Decreased pedal pulses    Fissure in skin of both feet    Tinea pedis of both feet      Follow up in about 1 week (around 6/10/2022).    Procedures          Counseling:     I provided patient education verbally regarding:   Patient diagnosis, treatment options, as well as alternatives, risks, and benefits.     This note was created using Dragon voice recognition software that occasionally misinterpreted phrases or words.

## 2022-06-10 ENCOUNTER — OFFICE VISIT (OUTPATIENT)
Dept: WOUND CARE | Facility: HOSPITAL | Age: 72
End: 2022-06-10
Attending: PODIATRIST
Payer: MEDICARE

## 2022-06-10 VITALS
TEMPERATURE: 98 F | HEART RATE: 92 BPM | DIASTOLIC BLOOD PRESSURE: 70 MMHG | RESPIRATION RATE: 18 BRPM | SYSTOLIC BLOOD PRESSURE: 147 MMHG

## 2022-06-10 DIAGNOSIS — L97.524 ULCER OF LEFT FOOT WITH NECROSIS OF BONE: Primary | ICD-10-CM

## 2022-06-10 DIAGNOSIS — R23.4 FISSURE IN SKIN OF BOTH FEET: ICD-10-CM

## 2022-06-10 DIAGNOSIS — L97.522 ULCER OF LEFT FOOT WITH FAT LAYER EXPOSED: ICD-10-CM

## 2022-06-10 DIAGNOSIS — L97.513 ULCER OF RIGHT FOOT WITH NECROSIS OF MUSCLE: ICD-10-CM

## 2022-06-10 DIAGNOSIS — Z89.432 HISTORY OF AMPUTATION OF LEFT FOOT: ICD-10-CM

## 2022-06-10 DIAGNOSIS — L08.9 DIABETIC FOOT INFECTION: ICD-10-CM

## 2022-06-10 DIAGNOSIS — M86.9 OSTEOMYELITIS OF GREAT TOE OF LEFT FOOT: ICD-10-CM

## 2022-06-10 DIAGNOSIS — M86.10 ACUTE OSTEOMYELITIS: ICD-10-CM

## 2022-06-10 DIAGNOSIS — Z91.199 NON COMPLIANCE WITH MEDICAL TREATMENT: ICD-10-CM

## 2022-06-10 DIAGNOSIS — E11.628 DIABETIC FOOT INFECTION: ICD-10-CM

## 2022-06-10 DIAGNOSIS — Z87.2 HISTORY OF ULCER OF LOWER EXTREMITY: ICD-10-CM

## 2022-06-10 DIAGNOSIS — E11.65 UNCONTROLLED TYPE 2 DIABETES MELLITUS WITH HYPERGLYCEMIA: ICD-10-CM

## 2022-06-10 DIAGNOSIS — E11.9 COMPREHENSIVE DIABETIC FOOT EXAMINATION, TYPE 2 DM, ENCOUNTER FOR: ICD-10-CM

## 2022-06-10 DIAGNOSIS — E66.9 OBESITY (BMI 30.0-34.9): ICD-10-CM

## 2022-06-10 DIAGNOSIS — R09.89 DECREASED PEDAL PULSES: ICD-10-CM

## 2022-06-10 DIAGNOSIS — Z91.89 AT HIGH RISK FOR INADEQUATE NUTRITIONAL INTAKE: ICD-10-CM

## 2022-06-10 DIAGNOSIS — B35.3 TINEA PEDIS OF BOTH FEET: ICD-10-CM

## 2022-06-10 PROCEDURE — 99214 PR OFFICE/OUTPT VISIT, EST, LEVL IV, 30-39 MIN: ICD-10-PCS | Mod: ,,, | Performed by: PODIATRIST

## 2022-06-10 PROCEDURE — 99214 OFFICE O/P EST MOD 30 MIN: CPT | Mod: ,,, | Performed by: PODIATRIST

## 2022-06-10 PROCEDURE — 99214 OFFICE O/P EST MOD 30 MIN: CPT | Performed by: PODIATRIST

## 2022-06-10 NOTE — PATIENT INSTRUCTIONS
Much of the documentation for this visit was completed in wound docs system. Please see the attached documentation for further details about the patients care. Scanned under the media tab.

## 2022-06-10 NOTE — PROGRESS NOTES
[address] [facility] [address]  [place] [address]  Phone: [phone]   [phone]    Patient's PCP:Veena Stroud MD  Referring Provider: No ref. provider found    Subjective:      Chief Complaint:: Wound Care, Diabetes, Wound Check, and Pressure Ulcer    HPI        1. Patient is healed See Wound Docs for assessment   2. Continue taking all medications as prescribed  3. Continue home health dressing to the healed area for protection and padding   4. RTC one week   5. Counseled patient on increasing protein intake, not getting wounds wet, keeping dressings clean dry and intact, following a healthy diet, elevating legs when able, removing pressure from wounds               Additional patient instructions:      - Check feet daily for wounds and areas of irritation.     - Keep regularly scheduled appointments     - Apply moisturizing cream to feet and ankles daily but not between toes.      - Keep feet clean and dry, especially between toes.     - Elevate feet as much as possible throughout the day.     - Wear supportive/accommodative shoes at all times when ambulating.     - Wear over-the-counter compression socks at all times when ambulating.  Do not wear them while resting for prolonged periods of time such as when sleeping.      - Notify clinic immediately of any new or worsening conditions.        Athlete's foot counseling: Counseled patient that it is important to keep the feet dry. Use absorbent cotton socks and change them if they become sweaty. Or wear an open-toe shoe or sandal. Wash the feet at least once a day with soap and water. Apply the antifungal cream as prescribed. Recommend spray antiperspirant to the feet. Some antifungal creams are available without a prescription. It may take a week before the rash starts to improve. It can take about 3 to 4 weeks to completely clear. Continue the medicine until the rash is all gone. Use over-the-counter antifungal powders or sprays on your feet after  exposure to high-risk environments, such as public showers, gyms, and locker rooms. This can help prevent future infections. Wearing appropriate shoes in these situations can help.        Fungal infection of skin explained. Treatment options including no treatment, topical medications, oral medications were discussed, as well as success rates and risks of recurrence. We agreed on topical medication         Bandar Tomas is a 71 y.o. male  with past medical history of hypertension, hyperlipidemia, uncontrolled diabetes, diabetic neuropathy who presents to clinic with a complaint of left great toe ulcer status post amputation of left great toe lasting for approximately 2 weeks. Onset of symptoms was initially patient had a severe infected wound on the left foot and was admitted to the hospital.  See discharge summary below.  He underwent a left great toe amputation with keeping the amputation site open due to not having enough viable skin for closure.  A wound VAC was started.  Referral to wound care was placed.     Upon presentation today, patient also has a open wound on the plantar right foot.  Patient states he does not check his feet so is unsure how long this has been present.  Patient's daughter is bedside and states patient lives alone and is not compliant with his diabetic medication, checking his feet, and additional recommendations by his doctors.  Counseled patient on the need to be compliant.  Counseled patient that if he is not compliant he is risking limb loss as well.        Counseled patient regarding the need to get his blood sugars under control.  Discussed with patient that with blood sugars at this high level it is very difficult to heal wounds.        Discharge summary on 02/21/2022:  HPI:   Mr. Reyes is a 71-year-old male with a past medical history of hypertension, hyperlipidemia, insulin-dependent diabetes mellitus type 2, and obesity BMI 32 who presents today with complaints of a left toe  infection.  It is severe.  It is associated with purulent drainage, malodor, and pain.  He denies fever, chills, nausea, vomiting, diarrhea, dizziness, chest pain, shortness of breath, loss of consciousness.  He has known injury to the toe.  He states he took off his today issues about 2 weeks ago and noticeable ulcerations status toe.  He went to the pharmacy and ask pharmacist for recommendations and was recommended triple antibiotic ointment and soaks for the toe which he did with no improvement.  He does not have a podiatrist.  He states his glucoses have been out of control over the last 2 weeks foot due to this infection.  He does not have an endocrinologist.  X-ray of foot in the ED revealed: Osteomyelitis of great toe proximal and distal phalanges. Pathologic fracture of great toe distal phalanx  Procedure(s) (LRB):  AMPUTATION, TOE (Left)    Hospital Course:   Patient admitted with Diabetic ulcer of Left Great toe and associated acute Osteomyelitis  Pt underwent L great toe amputation  Later pt was discharged to home with HH /wound vac  Extensive education was provided regarding his Uncontrolled DM type 2            Systemic Doctor: Veena Stroud MD  Date Last Seen: about 4 months ago  Blood Sugar: doesn't check  Hemoglobin A1c: 16.4 (2/15/2022)        Patient should call the office immediately if any signs of infection, such as fever, chills, sweats, increased redness or pain.     Patient was instructed to call the clinic or go to the emergency department if their symptoms do not improve, worsens, or if new symptoms develop.  Patient was advised that if any increased swelling, pain, or numbness arise to go immediately to the ED. Patient knows to call any time if an emergency arises. Shared decision making occurred and patient verbalized understanding in agreement with this plan.         Counseling/Education:  I provided patient education verbally regarding:   The aspects of diabetes and how it pertains  to the feet. I explained the importance of proper diabetic foot care and how it is essential for the health of their feet.     I discussed the importance of knowing their Hemoglobin A1c and that the level needs to be as close to 6 as possible. I discussed the increase complications of high blood sugar including stroke, blindness, heart attack, kidney failure and loss of limb secondary to neuropathy and PVD.      With neuropathy, beware of any breaks in the skin or redness. These areas are not recognized early due to the numbness.     I discussed Diabetes, lower back issues, metabolic disorders, systemic causes, chemotherapy, vitamin deficiency, heavy metal exposure, as some of the causes. I also explained that as much as 40% of the time we can not find a cause. I discussed different treatments available to control the symptoms but which may not cure the problem.         Counseled patient on the aspects of diabetes and how it pertains to the feet.  I explained the importance of proper diabetic foot care and how it is essential for the health of their feet.        Shoe inspection. Patient instructed on proper foot hygeine. We discussed wearing proper shoe gear, daily foot inspections, never walking without protective shoe gear, never putting sharp instruments to feet, routine podiatric nail visits every 2-3 months.       I counseled the patient on their conditions, their implications and medical management.     >50% of this > 60 minute visit was spent face to face educating/counseling the patient     I spent a total of 60 minutes on the day of the visit.This includes face to face time and non-face to face time preparing to see the patient (eg, review of tests), obtaining and/or reviewing separately obtained history, documenting clinical information in the electronic or other health record, independently interpreting results and communicating results to the patient/family/caregiver, or care coordinator.        Much of  the documentation for this visit was completed in the Wound Docs system.  Please see the attached documentation for further details about the patient's care. Scanned under the Media tab.         Kirsty Rodríguez DPM        There were no vitals filed for this visit.   Shoe Size:     Past Surgical History:   Procedure Laterality Date    COLONOSCOPY  prior to 2007    COLONOSCOPY N/A 3/31/2016    Procedure: COLONOSCOPY;  Surgeon: Perry Rodriguez MD;  Location: [facility];  Service: Endoscopy;  Laterality: N/A;    SHOULDER SURGERY Left     TOE AMPUTATION Left 2/17/2022    Procedure: AMPUTATION, TOE;  Surgeon: Jesse Rodríguez DPM;  Location: [facility];  Service: Podiatry;  Laterality: Left;    UPPER GASTROINTESTINAL ENDOSCOPY       Past Medical History:   Diagnosis Date    Diabetes     Former smoker     HTN (hypertension)      Family History   Problem Relation Age of Onset    Brain cancer Brother 54    Esophageal cancer Paternal Grandfather 87    Colon polyps Neg Hx         Social History:   Marital Status:   Alcohol History:  reports current alcohol use.  Tobacco History:  reports that he has quit smoking. He has never used smokeless tobacco.  Drug History:  reports no history of drug use.    Review of patient's allergies indicates:  No Known Allergies    Current Outpatient Medications   Medication Sig Dispense Refill    aspirin 81 MG Chew Take 81 mg by mouth once daily.      blood sugar diagnostic Strp 100 each.  Supply whatever insurance covers. 100 each 11    doxycycline (MONODOX) 100 MG capsule Take 1 capsule (100 mg total) by mouth 2 (two) times daily. 120 capsule 0    hydroCHLOROthiazide (HYDRODIURIL) 25 MG tablet Take 25 mg by mouth every morning.      HYDROcodone-acetaminophen (NORCO) 5-325 mg per tablet Take 1 tablet by mouth every 6 (six) hours as needed for Pain. 30 tablet 0    insulin  unit/mL injection Inject 25 Units into the skin 2 (two) times daily before meals.      insulin  regular 100 unit/mL Inj injection Inject 25 Units into the skin 2 (two) times daily before meals.      losartan (COZAAR) 100 MG tablet Take 100 mg by mouth once daily.      pravastatin (PRAVACHOL) 40 MG tablet Take 40 mg by mouth once daily.      spironolactone (ALDACTONE) 50 MG tablet Take 50 mg by mouth once daily. med on hold       No current facility-administered medications for this visit.       Review of Systems   Constitutional: Negative for chills, fatigue, fever and unexpected weight change.   HENT: Negative for hearing loss and trouble swallowing.    Eyes: Negative for photophobia and visual disturbance.   Respiratory: Negative for cough, shortness of breath and wheezing.    Cardiovascular: Negative for chest pain, palpitations and leg swelling.   Gastrointestinal: Negative for abdominal pain and nausea.   Genitourinary: Negative for dysuria and frequency.   Musculoskeletal: Negative for arthralgias, back pain, gait problem, joint swelling and myalgias.   Skin: Negative for rash and wound.   Neurological: Negative for tremors, seizures, weakness, numbness and headaches.   Hematological: Does not bruise/bleed easily.         Objective:        Physical Exam:   Foot Exam  Physical Exam  Ortho/SPM Exam     Imaging:            Assessment:       1. Ulcer of left foot with necrosis of bone    2. Ulcer of left foot with fat layer exposed    3. Osteomyelitis of great toe of left foot    4. History of amputation of left foot    5. At high risk for inadequate nutritional intake    6. History of ulcer of lower extremity    7. Tinea pedis of both feet    8. Fissure in skin of both feet    9. Ulcer of right foot with necrosis of muscle    10. Decreased pedal pulses    11. Uncontrolled type 2 diabetes mellitus with hyperglycemia    12. Obesity (BMI 30.0-34.9)    13. Diabetic foot infection    14. Acute osteomyelitis    15. Non compliance with medical treatment    16. Comprehensive diabetic foot examination, type 2 DM,  encounter for      Plan:   Ulcer of left foot with necrosis of bone    Ulcer of left foot with fat layer exposed    Osteomyelitis of great toe of left foot    History of amputation of left foot    At high risk for inadequate nutritional intake    History of ulcer of lower extremity    Tinea pedis of both feet    Fissure in skin of both feet    Ulcer of right foot with necrosis of muscle    Decreased pedal pulses    Uncontrolled type 2 diabetes mellitus with hyperglycemia    Obesity (BMI 30.0-34.9)    Diabetic foot infection    Acute osteomyelitis    Non compliance with medical treatment    Comprehensive diabetic foot examination, type 2 DM, encounter for      No follow-ups on file.    Procedures          Counseling:     I provided patient education verbally regarding:   Patient diagnosis, treatment options, as well as alternatives, risks, and benefits.     This note was created using Dragon voice recognition software that occasionally misinterpreted phrases or words.

## 2023-07-13 ENCOUNTER — OFFICE VISIT (OUTPATIENT)
Dept: OPTOMETRY | Facility: CLINIC | Age: 73
End: 2023-07-13
Payer: COMMERCIAL

## 2023-07-13 DIAGNOSIS — H52.7 REFRACTIVE ERROR: ICD-10-CM

## 2023-07-13 DIAGNOSIS — H25.13 NUCLEAR SCLEROSIS OF BOTH EYES: ICD-10-CM

## 2023-07-13 DIAGNOSIS — E11.9 TYPE 2 DIABETES MELLITUS WITHOUT RETINOPATHY: Primary | ICD-10-CM

## 2023-07-13 PROCEDURE — 92015 PR REFRACTION: ICD-10-PCS | Mod: S$GLB,,, | Performed by: OPTOMETRIST

## 2023-07-13 PROCEDURE — 92004 PR EYE EXAM, NEW PATIENT,COMPREHESV: ICD-10-PCS | Mod: S$GLB,,, | Performed by: OPTOMETRIST

## 2023-07-13 PROCEDURE — 92004 COMPRE OPH EXAM NEW PT 1/>: CPT | Mod: S$GLB,,, | Performed by: OPTOMETRIST

## 2023-07-13 PROCEDURE — 92015 DETERMINE REFRACTIVE STATE: CPT | Mod: S$GLB,,, | Performed by: OPTOMETRIST

## 2023-07-13 PROCEDURE — 99999 PR PBB SHADOW E&M-EST. PATIENT-LVL III: ICD-10-PCS | Mod: PBBFAC,,, | Performed by: OPTOMETRIST

## 2023-07-13 PROCEDURE — 99999 PR PBB SHADOW E&M-EST. PATIENT-LVL III: CPT | Mod: PBBFAC,,, | Performed by: OPTOMETRIST

## 2023-07-13 NOTE — PROGRESS NOTES
HPI    New pt here for annual DM eye exam DLS- 3 years ago     Pt states his diabetes has not been stable, blood pressure is well   controlled on meds.   Vision has been stable, no changes noticed. Pt denies F/F and GTTS.     Last A1C was 2 months ago and was a 10.0      Last edited by Livia Farah on 7/13/2023  9:08 AM.            Assessment /Plan     For exam results, see Encounter Report.    Type 2 diabetes mellitus without retinopathy    Nuclear sclerosis of both eyes    Refractive error      No diabetic retinopathy, no csme. Return in 1 year for dilated eye exam.   2. Educated pt on presence of cataracts and effects on vision. No surgery at this time. Recheck in one year.   3. New Spectacle Rx given, discussed different options for glasses. RTC 1 year routine eye exam.

## 2023-09-06 ENCOUNTER — PATIENT MESSAGE (OUTPATIENT)
Dept: OPTOMETRY | Facility: CLINIC | Age: 73
End: 2023-09-06
Payer: MEDICARE

## 2024-02-14 ENCOUNTER — TELEPHONE (OUTPATIENT)
Dept: PODIATRY | Facility: CLINIC | Age: 74
End: 2024-02-14
Payer: COMMERCIAL

## 2024-02-19 ENCOUNTER — PATIENT MESSAGE (OUTPATIENT)
Dept: ADMINISTRATIVE | Facility: OTHER | Age: 74
End: 2024-02-19
Payer: COMMERCIAL

## 2025-07-08 ENCOUNTER — TELEPHONE (OUTPATIENT)
Dept: OPHTHALMOLOGY | Facility: CLINIC | Age: 75
End: 2025-07-08
Payer: MEDICARE

## (undated) DEVICE — BANDAGE ESMARK 4X9 55514

## (undated) DEVICE — SOLUTION IRRI NS BOTTLE 1000ML R5200-01

## (undated) DEVICE — SUTURE PROLENE 3-0 PS-2 18 8687H

## (undated) DEVICE — TRAY GENERAL SURGERY

## (undated) DEVICE — BLADE SCALPEL #15 371115

## (undated) DEVICE — BANDAGE KERLIX   441106

## (undated) DEVICE — SHOE POST OP MALE LARGE

## (undated) DEVICE — PADDING CAST 2 STERILE 30-319

## (undated) DEVICE — GAUZE VASELINE XEROFORM 5X9 8884433605

## (undated) DEVICE — SPONGE GAUZE 10S 4X4  442214

## (undated) DEVICE — BANDAGE ACE STERILE 4 REB3114

## (undated) DEVICE — GLOVE BIOGEL PI ULTRA TOUCH GRAY SZ7.5

## (undated) DEVICE — PAD ABD 5X9   7196D

## (undated) DEVICE — TUBE CULTURE AMIES 220117

## (undated) DEVICE — PAD BOVIE ADULT

## (undated) DEVICE — Device